# Patient Record
Sex: FEMALE | Race: BLACK OR AFRICAN AMERICAN | NOT HISPANIC OR LATINO | Employment: OTHER | URBAN - METROPOLITAN AREA
[De-identification: names, ages, dates, MRNs, and addresses within clinical notes are randomized per-mention and may not be internally consistent; named-entity substitution may affect disease eponyms.]

---

## 2019-04-27 ENCOUNTER — HOSPITAL ENCOUNTER (EMERGENCY)
Facility: HOSPITAL | Age: 84
Discharge: HOME | End: 2019-04-27
Attending: EMERGENCY MEDICINE
Payer: MEDICARE

## 2019-04-27 VITALS
HEIGHT: 67 IN | OXYGEN SATURATION: 98 % | DIASTOLIC BLOOD PRESSURE: 86 MMHG | RESPIRATION RATE: 20 BRPM | SYSTOLIC BLOOD PRESSURE: 172 MMHG | BODY MASS INDEX: 29.03 KG/M2 | HEART RATE: 95 BPM | WEIGHT: 185 LBS | TEMPERATURE: 99 F

## 2019-04-27 DIAGNOSIS — M72.2 PLANTAR FASCIITIS OF LEFT FOOT: Primary | ICD-10-CM

## 2019-04-27 DIAGNOSIS — M79.672 LEFT FOOT PAIN: ICD-10-CM

## 2019-04-27 PROCEDURE — 99283 EMERGENCY DEPT VISIT LOW MDM: CPT | Mod: 25

## 2019-04-27 NOTE — ED PROVIDER NOTES
Encounter Date: 4/27/2019    SCRIBE #1 NOTE: I, Snow Gipson, am scribing for, and in the presence of, Gino Betancourt MD.       History     Chief Complaint   Patient presents with    Foot Pain     left foot / denies injury        Time seen by provider: 8:01 AM on 04/27/2019    Soni Harris is a 85 y.o. female with HTN and osteoporosis who presents to the ED with an onset of worsening left foot pain over the past week. She states her pain is located along the arch along the bottom of the foot. The patient reports no injuries or trauma but did recently did get new shoes. She denies prior similar symptoms, fever, or any other symptoms at this time. No foot PSHx noted. No known drug allergies noted.    The history is provided by the patient.     Review of patient's allergies indicates:  No Known Allergies  Past Medical History:   Diagnosis Date    Arthritis     GERD (gastroesophageal reflux disease)     Glaucoma (increased eye pressure)     Hypertension      Past Surgical History:   Procedure Laterality Date    HYSTERECTOMY       History reviewed. No pertinent family history.  Social History     Tobacco Use    Smoking status: Former Smoker     Last attempt to quit: 9/18/2003     Years since quitting: 15.6   Substance Use Topics    Alcohol use: No    Drug use: No     Review of Systems   Constitutional: Negative for chills and fever.   HENT: Negative for nosebleeds.    Eyes: Negative for visual disturbance.   Respiratory: Negative for cough and shortness of breath.    Cardiovascular: Negative for chest pain and palpitations.   Gastrointestinal: Negative for abdominal pain, diarrhea, nausea and vomiting.   Genitourinary: Negative for dysuria and hematuria.   Musculoskeletal: Positive for myalgias (left foot). Negative for back pain and neck pain.   Skin: Negative for rash.   Neurological: Negative for seizures, syncope and headaches.     Physical Exam     Initial Vitals [04/27/19 0754]   BP Pulse Resp  Temp SpO2   (!) 172/86 95 20 98.7 °F (37.1 °C) 98 %      MAP       --         Physical Exam    Nursing note and vitals reviewed.  Constitutional: She appears well-developed and well-nourished. She is not diaphoretic. No distress.   HENT:   Head: Normocephalic and atraumatic.   Mouth/Throat: Oropharynx is clear and moist.   Eyes: Conjunctivae are normal.   Neck: Neck supple.   Cardiovascular: Normal rate, regular rhythm, normal heart sounds and intact distal pulses. Exam reveals no gallop and no friction rub.    No murmur heard.  Pulses:       Dorsalis pedis pulses are 1+ on the right side, and 1+ on the left side.   Unable to palpate the DP pulses bilaterally.    Pulmonary/Chest: Breath sounds normal. She has no wheezes. She has no rhonchi. She has no rales.   Musculoskeletal: Normal range of motion.        Left foot: There is no tenderness and no deformity.   No tenderness, edema, or deformity of the left foot.    Neurological: She is alert and oriented to person, place, and time.   5/5 strength. Symmetric, intact sensation.    Skin: No rash noted. No erythema.   Brisk capillary refill. Warm and well-perfused extremities.    Psychiatric: Her speech is normal.       ED Course   Procedures  Labs Reviewed - No data to display     Imaging Results          X-Ray Foot Complete Left (In process)                  Medical Decision Making:   History:   Old Medical Records: I decided to obtain old medical records.  Clinical Tests:   Radiological Study: Ordered and Reviewed            Scribe Attestation:   Scribe #1: I performed the above scribed service and the documentation accurately describes the services I performed. I attest to the accuracy of the note.    I, Dr. Gino Betancourt, personally performed the services described in this documentation. All medical record entries made by the scribe were at my direction and in my presence.  I have reviewed the chart and agree that the record reflects my personal performance  and is accurate and complete. Gino Betancourt MD.  8:48 AM 04/27/2019    Soni Harris is a 85 y.o. female presenting with pain along the arch of the left foot nontender to palpation consistent with plantar fasciitis.  No history of trauma with x-ray showing no fracture, dislocation, or other acute process.  Very low suspicion for other emergent process such as arterial occlusion or DVT.  Improved arch support with stretching exercises and weight-bearing as tolerated reviewed pending podiatry follow-up.  Acetaminophen as necessary for pain.  Return precautions reviewed.          ED Course as of Apr 27 0839   Sat Apr 27, 2019   0838 XR L Foot:  No fx, dislocation, FB. (my read)    [MR]      ED Course User Index  [MR] Gino Betancourt MD     Clinical Impression:       ICD-10-CM ICD-9-CM   1. Plantar fasciitis of left foot M72.2 728.71   2. Left foot pain M79.672 729.5         Disposition:   Disposition: Discharged  Condition: Stable                        Gino Betancourt MD  04/27/19 0849

## 2019-04-27 NOTE — ED NOTES
"Pt states pain to the arch of the left foot only when she puts pressure on it.  Plantar and dorsi flexion performed with no pain reported.  She states, "I feel like my arch dropped."  "

## 2019-05-07 ENCOUNTER — HOSPITAL ENCOUNTER (OUTPATIENT)
Dept: RADIOLOGY | Facility: CLINIC | Age: 84
Discharge: HOME | End: 2019-05-07
Attending: PODIATRIST
Payer: MEDICARE

## 2019-05-07 ENCOUNTER — OFFICE VISIT (OUTPATIENT)
Dept: PODIATRY | Facility: CLINIC | Age: 84
End: 2019-05-07
Payer: MEDICARE

## 2019-05-07 VITALS
WEIGHT: 184.94 LBS | DIASTOLIC BLOOD PRESSURE: 76 MMHG | BODY MASS INDEX: 29.03 KG/M2 | HEART RATE: 78 BPM | HEIGHT: 67 IN | SYSTOLIC BLOOD PRESSURE: 137 MMHG

## 2019-05-07 DIAGNOSIS — M79.672 FOOT PAIN, BILATERAL: ICD-10-CM

## 2019-05-07 DIAGNOSIS — M72.2 PLANTAR FASCIITIS: Primary | ICD-10-CM

## 2019-05-07 DIAGNOSIS — M79.671 FOOT PAIN, BILATERAL: ICD-10-CM

## 2019-05-07 DIAGNOSIS — M24.573 EQUINUS CONTRACTURE OF ANKLE: ICD-10-CM

## 2019-05-07 DIAGNOSIS — M72.2 PLANTAR FASCIITIS: ICD-10-CM

## 2019-05-07 PROCEDURE — 29540 PR STRAPPING; ANKLE &/OR FOOT: ICD-10-PCS | Mod: 50,S$GLB,, | Performed by: PODIATRIST

## 2019-05-07 PROCEDURE — 29540 STRAPPING ANKLE &/FOOT: CPT | Mod: 50,S$GLB,, | Performed by: PODIATRIST

## 2019-05-07 PROCEDURE — 73630 XR FOOT COMPLETE 3 VIEW BILATERAL: ICD-10-PCS | Mod: 26,50,S$GLB, | Performed by: RADIOLOGY

## 2019-05-07 PROCEDURE — 3075F SYST BP GE 130 - 139MM HG: CPT | Mod: CPTII,S$GLB,, | Performed by: PODIATRIST

## 2019-05-07 PROCEDURE — 99203 OFFICE O/P NEW LOW 30 MIN: CPT | Mod: 25,S$GLB,, | Performed by: PODIATRIST

## 2019-05-07 PROCEDURE — 3078F DIAST BP <80 MM HG: CPT | Mod: CPTII,S$GLB,, | Performed by: PODIATRIST

## 2019-05-07 PROCEDURE — 3075F PR MOST RECENT SYSTOLIC BLOOD PRESS GE 130-139MM HG: ICD-10-PCS | Mod: CPTII,S$GLB,, | Performed by: PODIATRIST

## 2019-05-07 PROCEDURE — 3078F PR MOST RECENT DIASTOLIC BLOOD PRESSURE < 80 MM HG: ICD-10-PCS | Mod: CPTII,S$GLB,, | Performed by: PODIATRIST

## 2019-05-07 PROCEDURE — 99999 PR PBB SHADOW E&M-EST. PATIENT-LVL III: ICD-10-PCS | Mod: PBBFAC,,, | Performed by: PODIATRIST

## 2019-05-07 PROCEDURE — 99203 PR OFFICE/OUTPT VISIT, NEW, LEVL III, 30-44 MIN: ICD-10-PCS | Mod: 25,S$GLB,, | Performed by: PODIATRIST

## 2019-05-07 PROCEDURE — 99999 PR PBB SHADOW E&M-EST. PATIENT-LVL III: CPT | Mod: PBBFAC,,, | Performed by: PODIATRIST

## 2019-05-07 PROCEDURE — 73630 X-RAY EXAM OF FOOT: CPT | Mod: 50,TC,FY,PO

## 2019-05-07 PROCEDURE — 73630 X-RAY EXAM OF FOOT: CPT | Mod: 26,50,S$GLB, | Performed by: RADIOLOGY

## 2019-05-07 RX ORDER — DICLOFENAC SODIUM 10 MG/G
2 GEL TOPICAL 4 TIMES DAILY
Qty: 100 G | Refills: 2 | Status: SHIPPED | OUTPATIENT
Start: 2019-05-07

## 2019-05-07 NOTE — PROGRESS NOTES
Subjective:      Patient ID: Soni Harris is a 85 y.o. female.    Chief Complaint: Foot Pain    Throbbing pain bottom both arches with sometimes sharp flares.  Gradual onset, worsening over past several weeks, aggravated by increased weight bearing, shoe gear, pressure.  No previous medical treatment.  OTC pain med not helping. Denies trauma, surgery.    Review of Systems   Constitution: Negative for chills, diaphoresis, fever, malaise/fatigue and night sweats.   Cardiovascular: Negative for claudication, cyanosis, leg swelling and syncope.   Skin: Negative for color change, dry skin, nail changes, rash, suspicious lesions and unusual hair distribution.   Musculoskeletal: Negative for falls, joint pain, joint swelling, muscle cramps, muscle weakness and stiffness.   Gastrointestinal: Negative for constipation, diarrhea, nausea and vomiting.   Neurological: Negative for brief paralysis, disturbances in coordination, focal weakness, numbness, paresthesias, sensory change and tremors.           Objective:      Physical Exam   Constitutional: She is oriented to person, place, and time. She appears well-developed and well-nourished. She is cooperative. No distress.   Cardiovascular:   Pulses:       Popliteal pulses are 2+ on the right side, and 2+ on the left side.        Dorsalis pedis pulses are 2+ on the right side, and 2+ on the left side.        Posterior tibial pulses are 2+ on the right side, and 2+ on the left side.   Capillary refill 3 seconds all toes/distal feet, all toes/both feet warm to touch.      Negative lymphadenopathy bilateral popliteal fossa and tarsal tunnel.      Negavie lower extremity edema bilateral.     Musculoskeletal:        Right ankle: She exhibits normal range of motion, no swelling, no ecchymosis, no deformity, no laceration and normal pulse. Achilles tendon normal. Achilles tendon exhibits no pain, no defect and normal Whalen's test results.   Sharp deep pain to palpation inferior  arch right and left at medial plantar fascia and intrinsic muscluature without ecchymosis, erythema, edema, or cardinal signs infection, and no signs of trauma.    Ankle dorsiflexion decreased at <10 degrees bilateral with moderate increase with knee flexion bilateral.    Otherwise, Normal angle, base, station of gait. All ten toes without clubbing, cyanosis, or signs of ischemia.  No pain to palpation bilateral lower extremities.  Range of motion, stability, muscle strength, and muscle tone normal bilateral feet and legs.    Lymphadenopathy: No inguinal adenopathy noted on the right or left side.   Negative lymphadenopathy bilateral popliteal fossa and tarsal tunnel.    Negative lymphangitic streaking bilateral feet/ankles/legs.   Neurological: She is alert and oriented to person, place, and time. She has normal strength. She displays no atrophy and no tremor. No sensory deficit. She exhibits normal muscle tone. Gait normal.   Reflex Scores:       Patellar reflexes are 2+ on the right side and 2+ on the left side.       Achilles reflexes are 2+ on the right side and 2+ on the left side.  Negative tinel sign to percussion sural, superficial peroneal, deep peroneal, saphenous, and posterior tibial nerves right and left ankles and feet.     Skin: Skin is warm, dry and intact. Capillary refill takes 2 to 3 seconds. No abrasion, no bruising, no burn, no ecchymosis, no laceration, no lesion and no rash noted. She is not diaphoretic. No cyanosis or erythema. No pallor. Nails show no clubbing.     Skin is normal age and health appropriate color, turgor, texture, and temperature bilateral lower extremities without ulceration, hyperpigmentation, discoloration, masses nodules or cords palpated.  No ecchymosis, erythema, edema, or cardinal signs of infection bilateral lower extremities.     Psychiatric: She has a normal mood and affect.             Assessment:       Encounter Diagnoses   Name Primary?    Plantar fasciitis Yes     Foot pain, bilateral     Equinus contracture of ankle          Plan:       Soni was seen today for foot pain.    Diagnoses and all orders for this visit:    Plantar fasciitis  -     X-Ray Foot Complete Bilateral; Future    Foot pain, bilateral  -     X-Ray Foot Complete Bilateral; Future    Equinus contracture of ankle  -     X-Ray Foot Complete Bilateral; Future    Other orders  -     diclofenac sodium (VOLTAREN) 1 % Gel; Apply 2 g topically 4 (four) times daily.      I counseled the patient on her conditions, their implications and medical management.    Patient will stretch the tendo achilles complex three times daily as demonstrated in the office.  Literature was dispensed illustrating proper stretching technique.    I applied a plantar rest strapping to the patient's right and left foot to offload symptomatic area, support the arch, and relieve pain.    Patient will obtain over the counter arch supports and wear them in shoes whenever possible.  Athletic shoes intended for walking or running are usually best.    The patient was advised that NSAID-type medications have two very important potential side effects: gastrointestinal irritation including hemorrhage and renal injuries. She was asked to take the medication with food and to stop if she experiences any GI upset. I asked her to call for vomiting, abdominal pain or black/bloody stools. The patient expresses understanding of these issues and questions were answered.    Discussed conservative treatment with shoes of adequate dimensions, material, and style to alleviate symptoms and delay or prevent surgical intervention.    Rx xrays, voltaren gel.          No follow-ups on file.

## 2019-08-28 DIAGNOSIS — M77.8 TENDINITIS OF LEFT SHOULDER: Primary | ICD-10-CM

## 2019-08-28 DIAGNOSIS — M77.8 TENDINITIS OF RIGHT SHOULDER: ICD-10-CM

## 2020-09-03 ENCOUNTER — HOSPITAL ENCOUNTER (EMERGENCY)
Facility: HOSPITAL | Age: 85
Discharge: HOME | End: 2020-09-03
Attending: EMERGENCY MEDICINE
Payer: MEDICARE

## 2020-09-03 VITALS
HEART RATE: 78 BPM | BODY MASS INDEX: 26.63 KG/M2 | OXYGEN SATURATION: 94 % | TEMPERATURE: 99 F | SYSTOLIC BLOOD PRESSURE: 124 MMHG | DIASTOLIC BLOOD PRESSURE: 57 MMHG | RESPIRATION RATE: 16 BRPM | WEIGHT: 170 LBS

## 2020-09-03 DIAGNOSIS — M25.559 HIP PAIN: ICD-10-CM

## 2020-09-03 DIAGNOSIS — M25.551 RIGHT HIP PAIN: Primary | ICD-10-CM

## 2020-09-03 PROCEDURE — 99283 EMERGENCY DEPT VISIT LOW MDM: CPT | Mod: 25

## 2020-09-03 RX ORDER — MELOXICAM 7.5 MG/1
7.5 TABLET ORAL DAILY PRN
Qty: 10 TABLET | Refills: 0 | Status: SHIPPED | OUTPATIENT
Start: 2020-09-03 | End: 2022-01-18

## 2020-09-03 NOTE — ED PROVIDER NOTES
Encounter Date: 9/3/2020    SCRIBE #1 NOTE: IRoxann, am scribing for, and in the presence of, Rodolfo Jimenez MD.       History     Chief Complaint   Patient presents with    Leg Pain     intermiitent sharp upper right leg pain radiating up into back       Time seen by provider: 4:14 PM on 2020    Soni Harris is a 86 y.o. female with PMHx of HTN and arthritis who presents to the ED with complaints of right hip pain radiating to the thigh and back for 2 days. Pain is described as intermittent and stabbing in nature. Episodes of pain lasts for approximately 5 minutes. Per daughter, patient fell today in the restroom secondary to pain. Patient denies numbness, weakness, leg swelling, chest pain, SOB, fever, abdominal pain, N/V/D, rashes, or other new symptoms. The patient has no other medical concerns or complaints at this moment. PSHx includes hysterectomy.     The history is provided by the patient and a relative.     Review of patient's allergies indicates:  No Known Allergies  Past Medical History:   Diagnosis Date    Arthritis     GERD (gastroesophageal reflux disease)     Glaucoma (increased eye pressure)     Hypertension      Past Surgical History:   Procedure Laterality Date    HYSTERECTOMY       History reviewed. No pertinent family history.  Social History     Tobacco Use    Smoking status: Former Smoker     Quit date: 2003     Years since quittin.9   Substance Use Topics    Alcohol use: No    Drug use: No     Review of Systems   Constitutional: Negative for chills and fever.   Respiratory: Negative for shortness of breath.    Cardiovascular: Negative for chest pain.   Gastrointestinal: Negative for abdominal pain, diarrhea, nausea and vomiting.   Genitourinary: Negative for difficulty urinating.   Musculoskeletal: Positive for arthralgias. Negative for joint swelling.   Skin: Negative for pallor and rash.   Neurological: Negative for weakness and numbness.    Hematological: Negative for adenopathy.   Psychiatric/Behavioral: The patient is not nervous/anxious.        Physical Exam     Initial Vitals [09/03/20 1423]   BP Pulse Resp Temp SpO2   (!) 120/59 92 16 98.8 °F (37.1 °C) 96 %      MAP       --         Physical Exam    Nursing note and vitals reviewed.  Constitutional: She appears well-developed and well-nourished. She is not diaphoretic. No distress.   HENT:   Head: Normocephalic and atraumatic.   Eyes: EOM are normal. Pupils are equal, round, and reactive to light.   Neck: Normal range of motion. Neck supple.   Cardiovascular: Normal rate, regular rhythm, normal heart sounds and intact distal pulses. Exam reveals no gallop and no friction rub.    No murmur heard.  Pulmonary/Chest: Breath sounds normal. No respiratory distress. She has no wheezes. She has no rhonchi. She has no rales.   Abdominal: Soft. There is no abdominal tenderness. There is no rebound and no guarding.   Musculoskeletal: Normal range of motion.      Right hip: She exhibits tenderness. She exhibits normal range of motion.      Comments: Tenderness to posterior right hip. Negative SLR bilaterally.    Neurological: She is alert and oriented to person, place, and time.   Skin: Skin is warm and dry.   Psychiatric: She has a normal mood and affect. Her behavior is normal. Judgment and thought content normal.         ED Course   Procedures  Labs Reviewed - No data to display       Imaging Results          X-Ray Hip 2 View Right (Final result)  Result time 09/03/20 17:09:10    Final result by Kristen Villela MD (09/03/20 17:09:10)                 Impression:      Degenerative changes at the hips.  No acute fracture or dislocation.      Electronically signed by: Kristen Villela MD  Date:    09/03/2020  Time:    17:09             Narrative:    EXAMINATION:  XR HIP 2 VIEW RIGHT    CLINICAL HISTORY:  Pain in unspecified hip    TECHNIQUE:  AP view of the pelvis and frog leg lateral view of the right hip  were performed.    COMPARISON:  None    FINDINGS:  There are mild degenerative changes at the hips bilaterally and medial aspects of the hip joints appear mildly narrowed bilaterally.  Small calcification adjacent to the right superior acetabular rim suggesting os acetabular other could be related to labral pathology.  No acute fracture or dislocation.  There is sclerosis of the body of the symphysis pubis.                                 Medical Decision Making:   History:   Old Medical Records: I decided to obtain old medical records.  Initial Assessment:   86-year-old female presented with hip pain.  Differential Diagnosis:   Initial differential diagnosis included but not limited to osteoarthritis, strain, and sprain.  Clinical Tests:   Radiological Study: Reviewed and Ordered  ED Management:  The patient was urgently evaluated in the emergency department, her evaluation was significant for an elderly female with tenderness noted to the right hip.  The patient's x-ray was significant for degenerative changes only per Radiology.  The patient likely has osteoarthritis causing her symptoms.  The patient is stable for discharge to home.  She will be discharged home with p.o. Mobic and she is to otherwise follow up with her PCP for further care.            Scribe Attestation:   Scribe #1: I performed the above scribed service and the documentation accurately describes the services I performed. I attest to the accuracy of the note.        I, Dr. Rodolfo Jimenez, personally performed the services described in this documentation. All medical record entries made by the scribe were at my direction and in my presence.  I have reviewed the chart and agree that the record reflects my personal performance and is accurate and complete. Rodolfo Jimenez MD.  7:26 PM 09/03/2020          Clinical Impression:       ICD-10-CM ICD-9-CM   1. Right hip pain  M25.551 719.45   2. Hip pain  M25.559 719.45             ED Disposition  Condition    Discharge Stable        ED Prescriptions     Medication Sig Dispense Start Date End Date Auth. Provider    meloxicam (MOBIC) 7.5 MG tablet Take 1 tablet (7.5 mg total) by mouth daily as needed for Pain. 10 tablet 9/3/2020  Rodolfo Jimenez MD        Follow-up Information     Follow up With Specialties Details Why Contact Info    Fran Gilbert MD Family Medicine Schedule an appointment as soon as possible for a visit   [address] [facility] [place] [address]  [phone]                                       Rodolfo Jimenze MD  09/03/20 1928

## 2020-09-03 NOTE — ED NOTES
Assumed care:  Soni Harris is awake, alert and oriented x 3, skin warm and dry, in NAD with family at bedside.  Patient CO right leg pain.  States that pain started 2 days ago.  States that pain was on the top of her leg now radiating to right hip and down back side of right leg.  States that pain comes in spasms lasting about 5 minutes.  States that today, pain started while she was in the bathroom and she fell.  Denies any injuries from that.      Patient identifiers for Soni Harris checked and correct.  LOC:  Soni Harris is awake, alert, and aware of environment with an appropriate affect. She is oriented x 3 and speaking appropriately.  APPEARANCE:  She is resting comfortably and in no acute distress. She is clean and well groomed, patient's clothing is properly fastened.  SKIN:  The skin is warm and dry. She has normal skin turgor and moist mucus membranes. Skin is intact; no bruising or breakdown noted.  MUSCULOSKELETAL:  She is moving all extremities well, no obvious deformities noted. Pulses intact.  Right leg pain  RESPIRATORY:  Airway is open and patent. Respirations are spontaneous and non-labored with normal effort and rate.  CARDIAC:  She has a normal rate and rhythm. No peripheral edema noted. Capillary refill < 3 seconds.  ABDOMEN:  No distention noted.  Soft and non-tender upon palpation.  NEUROLOGICAL:  PERRL. Facial expression is symmetrical. Hand grasps are equal bilaterally. Normal sensation in all extremities when touched with finger.  Allergies reported:  Review of patient's allergies indicates:  No Known Allergies  OTHER NOTES:

## 2021-11-22 ENCOUNTER — TELEPHONE (OUTPATIENT)
Dept: NEUROLOGY | Facility: CLINIC | Age: 86
End: 2021-11-22
Payer: MEDICARE

## 2021-11-24 ENCOUNTER — TELEPHONE (OUTPATIENT)
Dept: NEUROLOGY | Facility: CLINIC | Age: 86
End: 2021-11-24
Payer: MEDICARE

## 2021-11-29 ENCOUNTER — TELEPHONE (OUTPATIENT)
Dept: NEUROLOGY | Facility: CLINIC | Age: 86
End: 2021-11-29
Payer: MEDICARE

## 2022-01-18 ENCOUNTER — OFFICE VISIT (OUTPATIENT)
Dept: NEUROLOGY | Facility: CLINIC | Age: 87
End: 2022-01-18
Payer: MEDICARE

## 2022-01-18 ENCOUNTER — LAB VISIT (OUTPATIENT)
Dept: LAB | Facility: HOSPITAL | Age: 87
End: 2022-01-18
Payer: MEDICARE

## 2022-01-18 VITALS
RESPIRATION RATE: 17 BRPM | SYSTOLIC BLOOD PRESSURE: 131 MMHG | DIASTOLIC BLOOD PRESSURE: 62 MMHG | HEIGHT: 67 IN | HEART RATE: 67 BPM | BODY MASS INDEX: 22.02 KG/M2 | WEIGHT: 140.31 LBS

## 2022-01-18 DIAGNOSIS — F02.818 LATE ONSET ALZHEIMER'S DEMENTIA WITH BEHAVIORAL DISTURBANCE: Primary | ICD-10-CM

## 2022-01-18 DIAGNOSIS — F02.818 LATE ONSET ALZHEIMER'S DEMENTIA WITH BEHAVIORAL DISTURBANCE: ICD-10-CM

## 2022-01-18 DIAGNOSIS — G30.1 LATE ONSET ALZHEIMER'S DEMENTIA WITH BEHAVIORAL DISTURBANCE: Primary | ICD-10-CM

## 2022-01-18 DIAGNOSIS — G30.1 LATE ONSET ALZHEIMER'S DEMENTIA WITH BEHAVIORAL DISTURBANCE: ICD-10-CM

## 2022-01-18 LAB
FOLATE SERPL-MCNC: 17.1 NG/ML (ref 4–24)
TSH SERPL DL<=0.005 MIU/L-ACNC: 1.66 UIU/ML (ref 0.4–4)
VIT B12 SERPL-MCNC: 1152 PG/ML (ref 210–950)

## 2022-01-18 PROCEDURE — 86592 SYPHILIS TEST NON-TREP QUAL: CPT | Performed by: NURSE PRACTITIONER

## 2022-01-18 PROCEDURE — 82607 VITAMIN B-12: CPT | Performed by: NURSE PRACTITIONER

## 2022-01-18 PROCEDURE — 1126F PR PAIN SEVERITY QUANTIFIED, NO PAIN PRESENT: ICD-10-PCS | Mod: CPTII,S$GLB,, | Performed by: NURSE PRACTITIONER

## 2022-01-18 PROCEDURE — 1101F PR PT FALLS ASSESS DOC 0-1 FALLS W/OUT INJ PAST YR: ICD-10-PCS | Mod: CPTII,S$GLB,, | Performed by: NURSE PRACTITIONER

## 2022-01-18 PROCEDURE — 36415 COLL VENOUS BLD VENIPUNCTURE: CPT | Mod: PO | Performed by: NURSE PRACTITIONER

## 2022-01-18 PROCEDURE — 3288F PR FALLS RISK ASSESSMENT DOCUMENTED: ICD-10-PCS | Mod: CPTII,S$GLB,, | Performed by: NURSE PRACTITIONER

## 2022-01-18 PROCEDURE — 99205 OFFICE O/P NEW HI 60 MIN: CPT | Mod: S$GLB,,, | Performed by: NURSE PRACTITIONER

## 2022-01-18 PROCEDURE — 1159F PR MEDICATION LIST DOCUMENTED IN MEDICAL RECORD: ICD-10-PCS | Mod: CPTII,S$GLB,, | Performed by: NURSE PRACTITIONER

## 2022-01-18 PROCEDURE — 82746 ASSAY OF FOLIC ACID SERUM: CPT | Performed by: NURSE PRACTITIONER

## 2022-01-18 PROCEDURE — 1160F PR REVIEW ALL MEDS BY PRESCRIBER/CLIN PHARMACIST DOCUMENTED: ICD-10-PCS | Mod: CPTII,S$GLB,, | Performed by: NURSE PRACTITIONER

## 2022-01-18 PROCEDURE — 1126F AMNT PAIN NOTED NONE PRSNT: CPT | Mod: CPTII,S$GLB,, | Performed by: NURSE PRACTITIONER

## 2022-01-18 PROCEDURE — 1159F MED LIST DOCD IN RCRD: CPT | Mod: CPTII,S$GLB,, | Performed by: NURSE PRACTITIONER

## 2022-01-18 PROCEDURE — 1101F PT FALLS ASSESS-DOCD LE1/YR: CPT | Mod: CPTII,S$GLB,, | Performed by: NURSE PRACTITIONER

## 2022-01-18 PROCEDURE — 1160F RVW MEDS BY RX/DR IN RCRD: CPT | Mod: CPTII,S$GLB,, | Performed by: NURSE PRACTITIONER

## 2022-01-18 PROCEDURE — 99205 PR OFFICE/OUTPT VISIT, NEW, LEVL V, 60-74 MIN: ICD-10-PCS | Mod: S$GLB,,, | Performed by: NURSE PRACTITIONER

## 2022-01-18 PROCEDURE — 84443 ASSAY THYROID STIM HORMONE: CPT | Performed by: NURSE PRACTITIONER

## 2022-01-18 PROCEDURE — 99999 PR PBB SHADOW E&M-EST. PATIENT-LVL V: ICD-10-PCS | Mod: PBBFAC,,, | Performed by: NURSE PRACTITIONER

## 2022-01-18 PROCEDURE — 3288F FALL RISK ASSESSMENT DOCD: CPT | Mod: CPTII,S$GLB,, | Performed by: NURSE PRACTITIONER

## 2022-01-18 PROCEDURE — 99999 PR PBB SHADOW E&M-EST. PATIENT-LVL V: CPT | Mod: PBBFAC,,, | Performed by: NURSE PRACTITIONER

## 2022-01-18 RX ORDER — GLUCOSAM/CHONDRO/HERB 149/HYAL 750-100 MG
TABLET ORAL
COMMUNITY

## 2022-01-18 RX ORDER — ALENDRONATE SODIUM 5 MG/1
5 TABLET ORAL
COMMUNITY

## 2022-01-18 RX ORDER — DONEPEZIL HYDROCHLORIDE 10 MG/1
10 TABLET, FILM COATED ORAL DAILY
COMMUNITY
Start: 2021-11-08

## 2022-01-18 RX ORDER — ATORVASTATIN CALCIUM 20 MG/1
20 TABLET, FILM COATED ORAL DAILY
COMMUNITY
Start: 2021-11-08

## 2022-01-18 RX ORDER — METOPROLOL SUCCINATE 50 MG/1
50 TABLET, EXTENDED RELEASE ORAL DAILY
COMMUNITY
Start: 2021-09-27

## 2022-01-18 RX ORDER — MEMANTINE HYDROCHLORIDE 10 MG/1
10 TABLET ORAL DAILY
COMMUNITY

## 2022-01-18 RX ORDER — DOCUSATE SODIUM 100 MG/1
100 CAPSULE, LIQUID FILLED ORAL DAILY
COMMUNITY
End: 2022-02-23 | Stop reason: SDUPTHER

## 2022-01-18 NOTE — PATIENT INSTRUCTIONS
To prevent further memory loss, some of the best preventative measures are following a healthy diet, getting regular exercise, and ensuring good sleep habits.  Approximately 30 to 45 minutes of brisk physical activity (brisk walking, swimming, stationary bicycle, etc.) 5 days a week has been shown to improve function in vascular dementias, and can lower the risk of stroke and slow progression of memory loss.       To prevent further memory loss, some of the best preventative measures are following a healthy diet, getting regular exercise, and ensuring good sleep habits.  Approximately 30 to 45 minutes of brisk physical activity (brisk walking, swimming, stationary bicycle, etc.) 5 days a week has been shown to improve function in vascular dementias, and can lower the risk of stroke and slow progression of memory loss.     A Mediterranean style diet, or the DASH diet with lots of fresh fruits and vegetables, whole grains, more fish and chicken, less red meat, less dairy, less processed foods is also beneficial. For more information see:     https://www.rush.[facility]/news/diet-may-help-prevent-alzheimers      Minimize or eliminate the use of alcohol, and discuss any prescription medications you might be taking with your doctor to avoid medications which can cause sedation or worsen cognitive function.     Establish a regular, consistent sleep pattern and practice good sleep hygiene.  Avoid screen time (computer, TV, smartphones or tablets) or heavy meals for at least an hour before bedtime, and avoid caffeine or stimulants after 2 PM. Exercise earlier in the day or mornings and keep your sleeping environment comfortable.      Socializing with friends and family and staying both mentally and physically active is also very important. Continue with hobbies or activities that are engaging and practice activities that are mentally stimulating (word puzzles, Sudoku, etc) and stay active in the community.    Some  supplements which may be of potential benefit include:  - tumeric (curcumin) supplement  - omega-3 fatty acids with sufficient amounts of EPA and DHA, 1000 to 2000 mg a day   - Vitamin D3 supplement 2000 units (IU) daily   - Green tea and green tea extracts may also help (caffeine free)   - supplements containing Phosphatidylserine (PS) and phosphatidylcholine (PC)     There may be some benefit to dietary supplements, however there is no definitive evidence to support the prevention of cognitive impairment or dementia.     Please look into the following websites, to help you find resources including day programs, caregivers and caregiver support, legal questions, support groups, etc.    [facility] ([facility])  [facility] - [address]  [facility] - [address]    Group meetings facilitated by Aneudy Esparza MA, LARA [phone]    ADDRESS  Mailing Address:  [address] [place] [address] Address:  [address] [place] [address]   Web Address:  [email or web address] [email or web address]     Services offered at this location:  Chore, Congregate Meals, Home Delivered Meals, Homemaker, Information and Assistance, Legal, Material Aid, Medical Alert, Medication Management, NFCSP Information and Assistance, NFCSP In-Home Respite, NFCSP Material Aid, NGCSP Personal Care , NFCSP Public Education, NFCSP Sitter Service, NFCSP Support Groups, Nutrition Counseling, Nutrition Education, Outreach, Recreation, Transportation, Utility Assistance, Wellness, Area Agency on Aging, [place] on Aging      Website for the Alzheimers Association:  http://www.alz.org/    Excellent resources for finding community resources   Action plan navigator and online tools   Call 1658.133.9853 for 24/7 helpline    [facility] Office of Aging and Adult Services:  Http://www.ldh.[place].gov/index.cfm/subhome/12/n/7    Peace With Dementia: http://carepartnermentoring.com/    Website for  [facility] Agency on Ageing: http://ea.[place].[facility]/index.cfm?md=tanika&tmp=category&catID=38&nid=24&ssid=0&startIndex=1       PATIENT/FAMILY RESOURCES:  1. Alzheimer's Association       http://www.alz.org  2. Alzheimer's Foundation of [place]     http://www.alzfdn.org  3. The Alzheimers Disease Education and Referral Center  https://www.arnoldo.nih.gov/alzheimers  4. Lewy Body Dementia Association      http://www.lbda.org  5. National [place] of Mental Health     http://www.nimh.nih.gov  6. National [place] on Mental Illness     http://www.dayna.org  7. Mental Health [place]       http://www.mentalhealthamerica.net  8. Mental Health.gov        http://www.mentalhealth.gov  9. National [place] for Behavioral Health     http://www.thenationalcouncil.org  10. Substance Abuse and Mental Health Services Administration  http://www.samhsa.gov    11. Licensed local counselors, social workers, psychiatrists, psychologists - one starting point is the Psychology Today website, therapist finder

## 2022-01-18 NOTE — PROGRESS NOTES
NEUROLOGY  Outpatient CONSULT    [facility]  [address], [place] [address]  [phone] (office) / [phone] (fax)    Patient Name:  Soni Harris  :  1934  MR #:  [id number]  Acct #:  [id number]    Date of Neurology Consult: 2022  Name of Provider: AWILDA Allen    Other Physicians:  Fran Gilbert MD (Primary Care Physician); Lois Batista FNP-C (Referring)      Chief Complaint: Memory Loss      History of Present Illness (HPI):  Soni Harris is a left handed 87 y.o. female.   Patient is here today for memory loss. She is accompanied by her daughter who helps supply some information. Patient does not feel like she has memory issues. She currently lives at home with her daughter. Daughter states she may forget where she is and how to make her way through the house. She repeats often and has trouble operating the remote control.       Patient's highest level of education completed was highschool. 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 The onset of memory issues likely began about . Daughter states at that time she had to move closer to her to help care for her. She struggles more with short term memory. Daughter states she juliet often get frustrated when unable to recall. She denies depression. Daughter states she juliet often hear things that aren't there or think someone is knocking at the door. She wakes up frequently at night as daughter notices this. It is unsure how much she is sleeping at night. Patient has recently started on Melatonin. Her daughter is monitoring her while sleeping now. Patient may take small naps throughout the day. She struggles with executive function. Daughter is now managing her finances after the patient had issues with balancing her checkbook. Daughter also helps manage her medications. Daughter does assist with bathing her but patient is able to perform most ADLs without assistance. She has no isses  with word finding or object recognition. She reports some mild issues with urgency incontinence. She denies recent falls. She is no longer driving. During the day she watches TV. She is maintained on Aricpet and Namenda as per her PCP. She has been on these medications for about 2 years.               Past Medical, Surgical, Family & Social History:   Past Medical History:   Diagnosis Date    Arthritis     GERD (gastroesophageal reflux disease)     Glaucoma (increased eye pressure)     Hypertension      Past Surgical History:   Procedure Laterality Date    HYSTERECTOMY       History reviewed. No pertinent family history.  Alcohol use:  reports no history of alcohol use.   (Of note, 0.6 oz = 1 beer or 6 oz = 10 beers).  Tobacco use:  reports that she quit smoking about 18 years ago. Her smoking use included cigarettes. She has never used smokeless tobacco.  Street drug use:  reports no history of drug use.  Allergies: Patient has no known allergies..    Home Medications:     Current Outpatient Medications:     alendronate (FOSAMAX) 5 MG Tab, Take 5 mg by mouth before breakfast., Disp: , Rfl:     amlodipine (NORVASC) 2.5 MG tablet, Take 2.5 mg by mouth once daily., Disp: , Rfl:     aspirin 325 MG tablet, Take 1 tablet (325 mg total) by mouth once daily., Disp: 30 tablet, Rfl: 11    calcium citrate (CALCITRATE) 200 mg (950 mg) tablet, Take 1 tablet by mouth 2 (two) times daily., Disp: , Rfl:     diclofenac sodium (VOLTAREN) 1 % Gel, Apply 2 g topically 4 (four) times daily., Disp: 100 g, Rfl: 2    losartan (COZAAR) 25 MG tablet, Take 25 mg by mouth once daily., Disp: , Rfl:     metoprolol succinate (TOPROL-XL) 50 MG 24 hr tablet, Take 50 mg by mouth once daily., Disp: , Rfl:     multivit-min-FA-lycopen-lutein 0.4-300-250 mg-mcg-mcg Tab, Take by mouth., Disp: , Rfl:     omeprazole (PRILOSEC) 20 MG capsule, Take 20 mg by mouth once daily., Disp: , Rfl:     risedronate (ACTONEL) 5 MG tablet, Take 5 mg by  "mouth once a week., Disp: , Rfl:     atorvastatin (LIPITOR) 20 MG tablet, Take 20 mg by mouth once daily., Disp: , Rfl:     donepeziL (ARICEPT) 10 MG tablet, Take 10 mg by mouth once daily., Disp: , Rfl:     memantine (NAMENDA) 10 MG Tab, Take 10 mg by mouth once daily., Disp: , Rfl:     metoprolol tartrate (LOPRESSOR) 25 MG tablet, Take 50 mg by mouth once daily., Disp: , Rfl:     omega 3-dha-epa-fish oil (FISH OIL) 1,000 mg (120 mg-180 mg) Cap, 1 capsule, Disp: , Rfl:     Physical Examination:  /62 (BP Location: Left arm, Patient Position: Sitting, BP Method: Medium (Automatic))   Pulse 67   Resp 17   Ht 5' 7" (1.702 m)   Wt 63.6 kg (140 lb 5.2 oz)   BMI 21.98 kg/m²   MMSE 1/18/2022   What is the (year), (season), (date), (day), (month)? 0   Where are we (state), (country), (town or city), (hospital), (floor)? 2   Name 3 common objects (eg. "apple", "table", "harleen"). Take 1 second to say each. Then ask the patient to repeat all 3. Give 1 point for each correct answer. Then repeat them until he/she learns all 3. Count trials and record. 2   Serial 7's backwards. Stop after 5 answers. (100,93,86,79,72) or alternatively  spell "WORLD" backwards. (D..L..R..O..W). The score is the number of letters in correct order. 0   Ask for the 3 common objects named earlier in the exam. Give 1 point for each correct answer. 0   Name a "pencil" and "watch." 1   Repeat the following: "No ifs, ands, or buts." 1   Follow a 3-stage command: "Take a paper in your right hand, fold it in half, & put it on the floor." 1   Read and obey the following: (see paper exam) 0   Write a sentence. 1   Copy the following design: (see paper exam) 0   Total MMSE Score 8   Some recent data might be hidden       GENERAL:  General appearance: Well, non-toxic appearing.  No apparent distress.  Neck: supple.  .    MENTAL STATUS:  Alertness, attention span & concentration: decreased  Language: normal.  Orientation to self, place & time:  " "Unable to state current date, month, year or city she lives in  Memory, recent & remote: limited  Fund of knowledge: limited  MMSE:8/30    SPEECH:  Clear and fluent.  Follows complex commands.    CRANIAL NERVES:  Cranial Nerves II-XII were examined.  II - Visual fields: normal.  III, IV, VI: PERRL, EOMI, No ptosis, No nystagmus.  V - Facial sensation: normal.  VII - Face symmetry & mobility: not assessed d/t COVID precautions  VIII - Hearing: normal.  IX, X - Palate:  not assessed d/t COVID precautions  XI - Shoulder shrug: normal.  XII - Tongue protrusion: not assessed d/t COVID precautions    GROSS MOTOR:  Gait & station: small steps, multi step turn,  slightly stooped posture  Tone: normal.  Abnormal movements: none.  Finger-nose: normal.  Rapid alternating movements: normal.  Pronator drift: normal      MUSCLE STRENGTH:   Generalized weakness throughout. Non focal    REFLEXES:    RIGHT Reflex   LEFT   2+ Biceps 2+   2+ Brachiorad. 2+        2+ Patellar 2+     SENSORY:  Light touch: Normal throughout.              Diagnostic Data Reviewed:         MRI brain 2013:  New very small area of peripheral infarction in a single gyrus of the left parietal lobe adjacent to the falx.     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  Mild brain atrophy with deep white matter ischemic changes.              Assessment and Plan:  Soni Harris is a 87 y.o. female.      Problem List Items Addressed This Visit        Other    Late onset Alzheimer's dementia with behavioral disturbance - Primary    Current Assessment & Plan     reports of memory decline with onset ~ 2016. Pt has been living with her daughter who helps care for her.   There are reports of repetition and short term memory difficulty. She is not wandering but does get up often throughout the night to "pittle" around the house. Daughter has recently started to monitor her at night. Daughter reports some auditory hallucinations but this " decreased after lowering her Namenda dose to 10 mg QD.   MMSE today 8/30   - suspect major neurodegenerative process, likely vascular etiology. Pt had difficulty completing entire MMSE. I do not believe she would be able to endure Neuro psych testing.    Obtain updated MRI brain and serologies   - MRI brain scan from 2013 reported old infarcts  Pt currently maintained on Namenda and Aricept. There are no previous records or memory testing in order to determine trends.  Safety concerns discussed.                               Important to note, also  has a past medical history of Arthritis, GERD (gastroesophageal reflux disease), Glaucoma (increased eye pressure), and Hypertension.            The patient will return to clinic in 4-6 months with either Dr. Stewart or  in memory clinic        All questions were answered and patient is comfortable with the plan.       Thank you very much for the opportunity to assist in this patient's care.    If you have any questions or concerns, please do not hesitate to contact me at any time.    Sincerely,     AWILDA Allen  [facility] [place]         I spent a total of 65 minutes on the day of the visit.This includes face to face time and non-face to face time preparing to see the patient (eg, review of tests), Obtaining and/or reviewing separately obtained history, Documenting clinical information in the electronic or other health record, Independently interpreting resultsand communicating results to the patient/family/caregiver, or Care coordination.

## 2022-01-18 NOTE — ASSESSMENT & PLAN NOTE
"reports of memory decline with onset ~ 2016. Pt has been living with her daughter who helps care for her.   There are reports of repetition and short term memory difficulty. She is not wandering but does get up often throughout the night to "pittle" around the house. Daughter has recently started to monitor her at night. Daughter reports some auditory hallucinations but this decreased after lowering her Namenda dose to 10 mg QD.   MMSE today 8/30   - suspect major neurodegenerative process, likely vascular etiology. Pt had difficulty completing entire MMSE. I do not believe she would be able to endure Neuro psych testing.    Obtain updated MRI brain and serologies   - MRI brain scan from 2013 reported old infarcts  Pt currently maintained on Namenda and Aricept. There are no previous records or memory testing in order to determine trends.  Safety concerns discussed.    "

## 2022-01-25 ENCOUNTER — PATIENT MESSAGE (OUTPATIENT)
Dept: NEUROLOGY | Facility: CLINIC | Age: 87
End: 2022-01-25
Payer: MEDICARE

## 2022-01-25 LAB — RPR SER QL: NORMAL

## 2022-01-28 ENCOUNTER — HOSPITAL ENCOUNTER (OUTPATIENT)
Dept: RADIOLOGY | Facility: HOSPITAL | Age: 87
Discharge: HOME | End: 2022-01-28
Attending: NURSE PRACTITIONER
Payer: MEDICARE

## 2022-01-28 DIAGNOSIS — F02.818 LATE ONSET ALZHEIMER'S DEMENTIA WITH BEHAVIORAL DISTURBANCE: ICD-10-CM

## 2022-01-28 DIAGNOSIS — G30.1 LATE ONSET ALZHEIMER'S DEMENTIA WITH BEHAVIORAL DISTURBANCE: ICD-10-CM

## 2022-01-28 PROCEDURE — 70551 MRI BRAIN STEM W/O DYE: CPT | Mod: TC,PO

## 2022-02-23 ENCOUNTER — HOSPITAL ENCOUNTER (EMERGENCY)
Facility: HOSPITAL | Age: 87
Discharge: HOME | End: 2022-02-23
Attending: EMERGENCY MEDICINE
Payer: MEDICARE

## 2022-02-23 VITALS
BODY MASS INDEX: 24.24 KG/M2 | HEIGHT: 64 IN | TEMPERATURE: 98 F | SYSTOLIC BLOOD PRESSURE: 192 MMHG | RESPIRATION RATE: 20 BRPM | HEART RATE: 67 BPM | OXYGEN SATURATION: 100 % | DIASTOLIC BLOOD PRESSURE: 82 MMHG | WEIGHT: 142 LBS

## 2022-02-23 DIAGNOSIS — N30.01 ACUTE CYSTITIS WITH HEMATURIA: Primary | ICD-10-CM

## 2022-02-23 DIAGNOSIS — K59.00 CONSTIPATION, UNSPECIFIED CONSTIPATION TYPE: ICD-10-CM

## 2022-02-23 LAB
ALBUMIN SERPL BCP-MCNC: 3.2 G/DL (ref 3.5–5.2)
ALP SERPL-CCNC: 108 U/L (ref 55–135)
ALT SERPL W/O P-5'-P-CCNC: 13 U/L (ref 10–44)
ANION GAP SERPL CALC-SCNC: 10 MMOL/L (ref 8–16)
AST SERPL-CCNC: 17 U/L (ref 10–40)
BACTERIA #/AREA URNS HPF: ABNORMAL /HPF
BASOPHILS # BLD AUTO: 0.07 K/UL (ref 0–0.2)
BASOPHILS NFR BLD: 0.8 % (ref 0–1.9)
BILIRUB SERPL-MCNC: 0.3 MG/DL (ref 0.1–1)
BILIRUB UR QL STRIP: NEGATIVE
BUN SERPL-MCNC: 12 MG/DL (ref 8–23)
CALCIUM SERPL-MCNC: 9.2 MG/DL (ref 8.7–10.5)
CHLORIDE SERPL-SCNC: 106 MMOL/L (ref 95–110)
CLARITY UR: ABNORMAL
CO2 SERPL-SCNC: 26 MMOL/L (ref 23–29)
COLOR UR: YELLOW
CREAT SERPL-MCNC: 0.8 MG/DL (ref 0.5–1.4)
DIFFERENTIAL METHOD: ABNORMAL
EOSINOPHIL # BLD AUTO: 0.2 K/UL (ref 0–0.5)
EOSINOPHIL NFR BLD: 2.9 % (ref 0–8)
ERYTHROCYTE [DISTWIDTH] IN BLOOD BY AUTOMATED COUNT: 13.2 % (ref 11.5–14.5)
EST. GFR  (AFRICAN AMERICAN): >60 ML/MIN/1.73 M^2
EST. GFR  (NON AFRICAN AMERICAN): >60 ML/MIN/1.73 M^2
GLUCOSE SERPL-MCNC: 96 MG/DL (ref 70–110)
GLUCOSE UR QL STRIP: NEGATIVE
HCT VFR BLD AUTO: 33.7 % (ref 37–48.5)
HGB BLD-MCNC: 10.9 G/DL (ref 12–16)
HGB UR QL STRIP: ABNORMAL
IMM GRANULOCYTES # BLD AUTO: 0.02 K/UL (ref 0–0.04)
IMM GRANULOCYTES NFR BLD AUTO: 0.2 % (ref 0–0.5)
KETONES UR QL STRIP: NEGATIVE
LEUKOCYTE ESTERASE UR QL STRIP: ABNORMAL
LIPASE SERPL-CCNC: 19 U/L (ref 4–60)
LYMPHOCYTES # BLD AUTO: 3.2 K/UL (ref 1–4.8)
LYMPHOCYTES NFR BLD: 38.6 % (ref 18–48)
MCH RBC QN AUTO: 30.6 PG (ref 27–31)
MCHC RBC AUTO-ENTMCNC: 32.3 G/DL (ref 32–36)
MCV RBC AUTO: 95 FL (ref 82–98)
MICROSCOPIC COMMENT: ABNORMAL
MONOCYTES # BLD AUTO: 0.9 K/UL (ref 0.3–1)
MONOCYTES NFR BLD: 10.8 % (ref 4–15)
NEUTROPHILS # BLD AUTO: 3.8 K/UL (ref 1.8–7.7)
NEUTROPHILS NFR BLD: 46.7 % (ref 38–73)
NITRITE UR QL STRIP: POSITIVE
NRBC BLD-RTO: 0 /100 WBC
PH UR STRIP: 7 [PH] (ref 5–8)
PLATELET # BLD AUTO: 238 K/UL (ref 150–450)
PMV BLD AUTO: 11 FL (ref 9.2–12.9)
POTASSIUM SERPL-SCNC: 4.6 MMOL/L (ref 3.5–5.1)
PROT SERPL-MCNC: 6.7 G/DL (ref 6–8.4)
PROT UR QL STRIP: NEGATIVE
RBC # BLD AUTO: 3.56 M/UL (ref 4–5.4)
RBC #/AREA URNS HPF: 9 /HPF (ref 0–4)
SODIUM SERPL-SCNC: 142 MMOL/L (ref 136–145)
SP GR UR STRIP: 1.01 (ref 1–1.03)
SQUAMOUS #/AREA URNS HPF: 23 /HPF
URN SPEC COLLECT METH UR: ABNORMAL
UROBILINOGEN UR STRIP-ACNC: NEGATIVE EU/DL
WBC # BLD AUTO: 8.24 K/UL (ref 3.9–12.7)
WBC #/AREA URNS HPF: 57 /HPF (ref 0–5)

## 2022-02-23 PROCEDURE — 81000 URINALYSIS NONAUTO W/SCOPE: CPT | Performed by: NURSE PRACTITIONER

## 2022-02-23 PROCEDURE — 36415 COLL VENOUS BLD VENIPUNCTURE: CPT | Performed by: NURSE PRACTITIONER

## 2022-02-23 PROCEDURE — 80053 COMPREHEN METABOLIC PANEL: CPT | Performed by: NURSE PRACTITIONER

## 2022-02-23 PROCEDURE — 83690 ASSAY OF LIPASE: CPT | Performed by: NURSE PRACTITIONER

## 2022-02-23 PROCEDURE — 85025 COMPLETE CBC W/AUTO DIFF WBC: CPT | Performed by: NURSE PRACTITIONER

## 2022-02-23 PROCEDURE — 63600175 PHARM REV CODE 636 W HCPCS: Performed by: NURSE PRACTITIONER

## 2022-02-23 PROCEDURE — 87088 URINE BACTERIA CULTURE: CPT | Performed by: NURSE PRACTITIONER

## 2022-02-23 PROCEDURE — 99285 EMERGENCY DEPT VISIT HI MDM: CPT | Mod: 25

## 2022-02-23 PROCEDURE — 96365 THER/PROPH/DIAG IV INF INIT: CPT

## 2022-02-23 PROCEDURE — 87086 URINE CULTURE/COLONY COUNT: CPT | Performed by: NURSE PRACTITIONER

## 2022-02-23 PROCEDURE — 25500020 PHARM REV CODE 255: Performed by: EMERGENCY MEDICINE

## 2022-02-23 PROCEDURE — 87077 CULTURE AEROBIC IDENTIFY: CPT | Performed by: NURSE PRACTITIONER

## 2022-02-23 PROCEDURE — 87186 SC STD MICRODIL/AGAR DIL: CPT | Performed by: NURSE PRACTITIONER

## 2022-02-23 RX ORDER — MSM/ALOE VERA/EMU OIL/HERBAL66
GEL (GRAM) TOPICAL
COMMUNITY

## 2022-02-23 RX ORDER — LOSARTAN POTASSIUM 50 MG/1
50 TABLET ORAL DAILY
COMMUNITY
Start: 2022-02-11

## 2022-02-23 RX ORDER — POLYETHYLENE GLYCOL 3350 17 G/17G
17 POWDER, FOR SOLUTION ORAL 2 TIMES DAILY
Qty: 10 EACH | Refills: 0 | Status: SHIPPED | OUTPATIENT
Start: 2022-02-23 | End: 2022-02-28

## 2022-02-23 RX ORDER — AMLODIPINE BESYLATE 5 MG/1
5 TABLET ORAL DAILY
COMMUNITY

## 2022-02-23 RX ORDER — DOCUSATE SODIUM 100 MG/1
100 CAPSULE, LIQUID FILLED ORAL 2 TIMES DAILY PRN
Qty: 30 CAPSULE | Refills: 0 | Status: SHIPPED | OUTPATIENT
Start: 2022-02-23

## 2022-02-23 RX ORDER — DOCUSATE SODIUM 100 MG/1
100 CAPSULE, LIQUID FILLED ORAL DAILY
Qty: 30 CAPSULE | Refills: 0 | Status: SHIPPED | OUTPATIENT
Start: 2022-02-23

## 2022-02-23 RX ORDER — AMOXICILLIN AND CLAVULANATE POTASSIUM 875; 125 MG/1; MG/1
1 TABLET, FILM COATED ORAL 2 TIMES DAILY
Qty: 14 TABLET | Refills: 0 | Status: SHIPPED | OUTPATIENT
Start: 2022-02-23

## 2022-02-23 RX ADMIN — IOHEXOL 75 ML: 350 INJECTION, SOLUTION INTRAVENOUS at 05:02

## 2022-02-23 RX ADMIN — CEFTRIAXONE 1 G: 1 INJECTION, SOLUTION INTRAVENOUS at 07:02

## 2022-02-23 NOTE — PHARMACY MED REC
"  Admission Medication History     The home medication history was taken by Jennie Irvin CPhT.    Medication history obtained from Patient      You may go to "Admission" then "Reconcile Home Medications" tabs to review and/or act upon these items.      The home medication list has been updated by the Pharmacy department.    Please read ALL comments highlighted in yellow.    Please address this information as you see fit.     Feel free to contact us if you have any questions or require assistance.      The medications listed below were removed from the home medication list.  Please reorder if appropriate:  Patient reports no longer taking the following medication(s):   Calcitrate 200mg      Jennie Irvin CPhT.  [phone]                .          "

## 2022-02-23 NOTE — ED PROVIDER NOTES
"Encounter Date: 2022    SCRIBE #1 NOTE: I, Fide Lacy, am scribing for, and in the presence of, GENESIS Candelaria.       History     Chief Complaint   Patient presents with    Abdominal Pain     Symptoms started last week, but denies nausea, vomiting diarrhea. Last Bm cannot remember. Possibly constipation     Time seen by provider: 3:40 PM on 2022    Soni Harris is a 88 y.o. female who presents to the ED with an onset of intermittent abdominal pain for 1 week.  Patient reports no N/V/D or fever.  She states the pain is persistent even with her changing to a "bland diet" including removing dairy and coffee as recommended by her PCP.  Pepto-Bismol has been taken for abdominal pain with mild improvement.  Patient expresses the pain occurs daily and notes that today the pain was bad enough to cause her to cry.  Abdominal pain is greatest in the RLQ.  Patient states her last BM was the day before yesterday.  She denies any other symptoms at this time.  Patient has a PMHx of HTN, GERD, dementia and arthritis.  PSHx includes hysterectomy.      The history is provided by the patient.     Review of patient's allergies indicates:  No Known Allergies  Past Medical History:   Diagnosis Date    Arthritis     GERD (gastroesophageal reflux disease)     Glaucoma (increased eye pressure)     Hypertension      Past Surgical History:   Procedure Laterality Date    HYSTERECTOMY       No family history on file.  Social History     Tobacco Use    Smoking status: Former Smoker     Types: Cigarettes     Quit date: 2003     Years since quittin.4    Smokeless tobacco: Never Used   Substance Use Topics    Alcohol use: No    Drug use: No     Review of Systems   Constitutional: Negative for fever.   HENT: Negative for sore throat.    Respiratory: Negative for shortness of breath.    Cardiovascular: Negative for chest pain.   Gastrointestinal: Positive for abdominal pain. Negative for diarrhea, nausea " and vomiting.   Genitourinary: Negative for dysuria.   Musculoskeletal: Negative for back pain.   Skin: Negative for rash.   Neurological: Negative for weakness.   Hematological: Does not bruise/bleed easily.       Physical Exam     Initial Vitals [02/23/22 1528]   BP Pulse Resp Temp SpO2   135/64 78 18 98.3 °F (36.8 °C) 100 %      MAP       --         Physical Exam    Nursing note and vitals reviewed.  Constitutional: Vital signs are normal. She appears well-developed and well-nourished.   HENT:   Head: Normocephalic and atraumatic.   Eyes: Pupils are equal, round, and reactive to light.   Neck: Neck supple.   Cardiovascular: Normal rate, regular rhythm, normal heart sounds and intact distal pulses. Exam reveals no gallop and no friction rub.    No murmur heard.  Pulmonary/Chest: Breath sounds normal. She has no wheezes. She has no rhonchi. She has no rales.   Abdominal: Abdomen is soft. Bowel sounds are normal. She exhibits no distension and no mass. There is abdominal tenderness in the right lower quadrant.   RLQ tenderness with palpation.   No right CVA tenderness.  No left CVA tenderness. There is no rebound and no guarding.   Genitourinary: Rectum:      No tenderness, external hemorrhoid, internal hemorrhoid or abnormal anal tone.      Genitourinary Comments: Rectal exam with no signs of impaction.     Musculoskeletal:      Cervical back: Neck supple.     Neurological: She is alert and oriented to person, place, and time. She has normal strength.   Skin: Skin is warm, dry and intact.   Psychiatric: She has a normal mood and affect. Her speech is normal and behavior is normal.         ED Course   Procedures  Labs Reviewed   CBC W/ AUTO DIFFERENTIAL - Abnormal; Notable for the following components:       Result Value    RBC 3.56 (*)     Hemoglobin 10.9 (*)     Hematocrit 33.7 (*)     All other components within normal limits   COMPREHENSIVE METABOLIC PANEL - Abnormal; Notable for the following components:     Albumin 3.2 (*)     All other components within normal limits   URINALYSIS, REFLEX TO URINE CULTURE - Abnormal; Notable for the following components:    Appearance, UA Cloudy (*)     Occult Blood UA Trace (*)     Nitrite, UA Positive (*)     Leukocytes, UA 2+ (*)     All other components within normal limits    Narrative:     Specimen Source->Urine   URINALYSIS MICROSCOPIC - Abnormal; Notable for the following components:    RBC, UA 9 (*)     WBC, UA 57 (*)     Bacteria Few (*)     All other components within normal limits    Narrative:     Specimen Source->Urine   CULTURE, URINE   LIPASE          Imaging Results          CT Abdomen Pelvis With Contrast (Edited Result - FINAL)  Result time 02/23/22 18:08:26    Addendum 1 of 1 by Anant Mendoza Jr., MD (02/23/22 18:08:26)      There is a small left inguinal hernia containing a loop of small bowel without CT evidence of obstruction.      Electronically signed by: Anant Mendoza MD  Date:    02/23/2022  Time:    18:08                 Final result by Anant Mendoza Jr., MD (02/23/22 18:00:22)                 Impression:      Diverticulosis coli without CT evidence of diverticulitis.  Prior granulomatous disease with multiple calcified granulomas of the liver spleen lung bases and mediastinum.  Neither a normal or abnormal appendix is identified.  Prior hysterectomy.  Small hiatal hernia.  A      Electronically signed by: Anant Mendoza MD  Date:    02/23/2022  Time:    18:00             Narrative:    EXAMINATION:  CT ABDOMEN PELVIS WITH CONTRAST    CLINICAL HISTORY:  RLQ abdominal pain (Age >= 14y);    TECHNIQUE:  Low dose axial images, sagittal and coronal reformations were obtained from the lung bases to the pubic symphysis following the IV administration of 75 mL of Omnipaque 350 and the oral administration of 30 mL of Omnipaque 350.    COMPARISON:  None.    FINDINGS:  There are multiple calcified granulomas in the lung bases and multiple calcified lymph  nodes in the mediastinum.  There is a small hiatal hernia.    The liver is of normal size and general CT density.  There are too numerous to count calcified granulomas in the liver.  There are too numerous to count calcified granulomas in the spleen as well.  The spleen is of normal size.  The gallbladder is of normal size without CT evidence of stone.  The pancreas is of normal contour and CT density without edema or mass.    The adrenal glands are not enlarged.  The kidneys are of normal size contour and contrast enhancement.  An intrarenal stone or hydronephrosis is not noted on either side.  There is heavy calcification of the abdominal aorta.  Retroperitoneal adenopathy is not identified.    Aside from the hiatal hernia the stomach appears of normal configuration.  Small bowel dilatation or air-fluid levels are not seen.  Contrasted stool is noted throughout the colon.  The colon is not distended.  Free fluid or free air is not seen.  Neither a normal or abnormal appendix is identified.  There is diverticulosis identified in the sigmoid colon without CT evidence of diverticulitis.    The bladder is of normal contour without mass or asymmetry.  A uterus is not seen.                                 Medications   iohexoL (OMNIPAQUE 350) injection 75 mL (75 mLs Intravenous Given 2/23/22 1708)   cefTRIAXone (ROCEPHIN) 1 g/50 mL D5W IVPB (0 g Intravenous Stopped 2/23/22 1947)     Medical Decision Making:   History:   Old Medical Records: I decided to obtain old medical records.  Differential Diagnosis:   Appendicitis  Colitis  constipation   Clinical Tests:   Lab Tests: Ordered and Reviewed       APC / Resident Notes:   Patient is a 88 y.o. female who presents to the ED 02/24/2022 who underwent emergent evaluation for abdominal pain for 1 week.  Patient had a bowel movement yesterday but does report some mild constipation.  She has mild right lower quadrant tenderness without guarding.  Her pain resolves  spontaneously in the emergency department and she requested no pain medication.  She has no nausea or vomiting and is tolerating p.o..  Vital signs normal with the exception of mild hypertension.  Labs noted are unremarkable.  Patient does appear to have a UTI.  She has no CVA tenderness.  She is not vomiting.  I do not think pyelonephritis.  She does not appear to be septic.  No leukocytosis.  She is not febrile.  She has no tachycardia.  Patient is given antibiotics for her UTI.  CT of abdomen pelvis without acute findings.  I do not think acute appendicitis, diverticulitis, perforation, abscess, or other acute process. Based on my clinical evaluation, I do not appreciate any immediate, emergent, or life threatening condition or etiology that warrants additional workup today and feel that the patient can be discharged with close follow up care.Follow up and return precautions discussed; patient verbalized understanding and is agreeable to plan of care. Patient discharged home in stable condition.              Scribe Attestation:   Scribe #1: I performed the above scribed service and the documentation accurately describes the services I performed. I attest to the accuracy of the note.    Attending Attestation:           Physician Attestation for Scribe:  Physician Attestation Statement for Scribe #1: I, Nelia Carias, reviewed documentation, as scribed by in my presence, and it is both accurate and complete.     Comments: I, GENESIS Candelaria, personally performed the services described in this documentation. All medical record entries made by the scribe were at my direction and in my presence.  I have reviewed the chart and agree that the record reflects my personal performance and is accurate and complete. GENESIS Candelaria.  10:07 AM 02/24/2022                     Clinical Impression:   Final diagnoses:  [N30.01] Acute cystitis with hematuria (Primary)  [K59.00] Constipation, unspecified constipation type           ED Disposition Condition    Discharge Stable        ED Prescriptions     Medication Sig Dispense Start Date End Date Auth. Provider    amoxicillin-clavulanate 875-125mg (AUGMENTIN) 875-125 mg per tablet Take 1 tablet by mouth 2 (two) times daily. 14 tablet 2/23/2022  Nelia Carias NP    polyethylene glycol (GLYCOLAX) 17 gram PwPk Take 17 g by mouth 2 (two) times daily. for 5 days 10 each 2/23/2022 2/28/2022 Nelia Carias NP    docusate sodium (COLACE) 100 MG capsule Take 1 capsule (100 mg total) by mouth 2 (two) times daily as needed for Constipation. 30 capsule 2/23/2022  Nelia Carias NP    docusate sodium (COLACE) 100 MG capsule Take 1 capsule (100 mg total) by mouth once daily. 30 capsule 2/23/2022  Nelia Carias NP        Follow-up Information     Follow up With Specialties Details Why Contact Info    Fran Gilbert MD Family Medicine In 2 days  [address] [facility] [id number] [phone]      [facility] Emergency Dept Emergency Medicine  As needed, If symptoms worsen [address] [place] [id number] [phone]           Nelia Carias NP  02/24/22 1459

## 2022-02-24 NOTE — ED NOTES
Pt to ed for abdominal pain X 1 week, worse in the rlq.  She has been eating a bland diet.    LOC: Patient name and date of birth verified. The patient is awake, alert and aware of environment with an appropriate affect, the patient is oriented x 3 and speaking appropriately.   APPEARANCE: Patient resting comfortably, patient is clean and well groomed, patient's clothing is properly fastened.  SKIN: The skin is warm and dry, color consistent with ethnicity, patient has normal skin turgor and moist mucus membranes, skin intact, no breakdown or bruising noted.  MUSCULOSKELETAL: Patient moving all extremities well, no obvious swelling or deformities noted.   RESPIRATORY: Respirations are spontaneous, patient has a normal effort and rate, no accessory muscle use noted.  CARDIAC: Patient has a normal rate and rhythm, no periphreal edema noted, capillary refill < 3 seconds.  ABDOMEN: Soft and no distention noted. Tender on palpation, worse in right lower quadrant.  Bowel sounds present in all four quadrants.  NEUROLOGIC: Eyes open spontaneously, behavior appropriate to situation, follows commands, facial expression symmetrical, bilateral hand grasp equal and even, purposeful motor response noted, normal sensation in all extremities when touched with a finger.

## 2022-02-25 LAB — BACTERIA UR CULT: ABNORMAL

## 2022-10-05 ENCOUNTER — HOSPITAL ENCOUNTER (INPATIENT)
Facility: HOSPITAL | Age: 87
LOS: 3 days | Discharge: HOME HEALTH | DRG: 481 | End: 2022-10-08
Attending: EMERGENCY MEDICINE | Admitting: HOSPITALIST
Payer: MEDICARE

## 2022-10-05 ENCOUNTER — ANESTHESIA (OUTPATIENT)
Dept: SURGERY | Facility: HOSPITAL | Age: 87
DRG: 481 | End: 2022-10-05
Payer: MEDICARE

## 2022-10-05 ENCOUNTER — ANESTHESIA EVENT (OUTPATIENT)
Dept: SURGERY | Facility: HOSPITAL | Age: 87
DRG: 481 | End: 2022-10-05
Payer: MEDICARE

## 2022-10-05 DIAGNOSIS — M25.551 RIGHT HIP PAIN: ICD-10-CM

## 2022-10-05 DIAGNOSIS — R07.9 CHEST PAIN: ICD-10-CM

## 2022-10-05 DIAGNOSIS — I63.9 CEREBRAL INFARCTION, UNSPECIFIED MECHANISM: ICD-10-CM

## 2022-10-05 DIAGNOSIS — S72.001A CLOSED FRACTURE OF NECK OF RIGHT FEMUR, INITIAL ENCOUNTER: Primary | ICD-10-CM

## 2022-10-05 DIAGNOSIS — G30.1 LATE ONSET ALZHEIMER'S DEMENTIA WITH BEHAVIORAL DISTURBANCE: ICD-10-CM

## 2022-10-05 DIAGNOSIS — S72.001A CLOSED FRACTURE OF RIGHT HIP, INITIAL ENCOUNTER: ICD-10-CM

## 2022-10-05 DIAGNOSIS — F02.818 LATE ONSET ALZHEIMER'S DEMENTIA WITH BEHAVIORAL DISTURBANCE: ICD-10-CM

## 2022-10-05 LAB
ANION GAP SERPL CALC-SCNC: 7 MMOL/L (ref 8–16)
BACTERIA #/AREA URNS HPF: ABNORMAL /HPF
BASOPHILS # BLD AUTO: 0.06 K/UL (ref 0–0.2)
BASOPHILS NFR BLD: 0.7 % (ref 0–1.9)
BILIRUB UR QL STRIP: NEGATIVE
BUN SERPL-MCNC: 11 MG/DL (ref 8–23)
CALCIUM SERPL-MCNC: 8.8 MG/DL (ref 8.7–10.5)
CHLORIDE SERPL-SCNC: 110 MMOL/L (ref 95–110)
CK SERPL-CCNC: 83 U/L (ref 20–180)
CLARITY UR: ABNORMAL
CO2 SERPL-SCNC: 21 MMOL/L (ref 23–29)
COLOR UR: YELLOW
CREAT SERPL-MCNC: 0.7 MG/DL (ref 0.5–1.4)
DIFFERENTIAL METHOD: ABNORMAL
EOSINOPHIL # BLD AUTO: 0.1 K/UL (ref 0–0.5)
EOSINOPHIL NFR BLD: 1.2 % (ref 0–8)
ERYTHROCYTE [DISTWIDTH] IN BLOOD BY AUTOMATED COUNT: 13.8 % (ref 11.5–14.5)
EST. GFR  (NO RACE VARIABLE): >60 ML/MIN/1.73 M^2
GLUCOSE SERPL-MCNC: 121 MG/DL (ref 70–110)
GLUCOSE UR QL STRIP: NEGATIVE
HCT VFR BLD AUTO: 32.1 % (ref 37–48.5)
HGB BLD-MCNC: 10.8 G/DL (ref 12–16)
HGB UR QL STRIP: NEGATIVE
IMM GRANULOCYTES # BLD AUTO: 0.02 K/UL (ref 0–0.04)
IMM GRANULOCYTES NFR BLD AUTO: 0.2 % (ref 0–0.5)
KETONES UR QL STRIP: NEGATIVE
LEUKOCYTE ESTERASE UR QL STRIP: ABNORMAL
LYMPHOCYTES # BLD AUTO: 1.8 K/UL (ref 1–4.8)
LYMPHOCYTES NFR BLD: 20.9 % (ref 18–48)
MCH RBC QN AUTO: 30.8 PG (ref 27–31)
MCHC RBC AUTO-ENTMCNC: 33.6 G/DL (ref 32–36)
MCV RBC AUTO: 92 FL (ref 82–98)
MICROSCOPIC COMMENT: ABNORMAL
MONOCYTES # BLD AUTO: 0.9 K/UL (ref 0.3–1)
MONOCYTES NFR BLD: 10 % (ref 4–15)
NEUTROPHILS # BLD AUTO: 5.8 K/UL (ref 1.8–7.7)
NEUTROPHILS NFR BLD: 67 % (ref 38–73)
NITRITE UR QL STRIP: NEGATIVE
NRBC BLD-RTO: 0 /100 WBC
PH UR STRIP: 7 [PH] (ref 5–8)
PLATELET # BLD AUTO: 155 K/UL (ref 150–450)
PMV BLD AUTO: 11.3 FL (ref 9.2–12.9)
POCT GLUCOSE: 134 MG/DL (ref 70–110)
POTASSIUM SERPL-SCNC: 4 MMOL/L (ref 3.5–5.1)
PROT UR QL STRIP: NEGATIVE
RBC # BLD AUTO: 3.51 M/UL (ref 4–5.4)
RBC #/AREA URNS HPF: 1 /HPF (ref 0–4)
SARS-COV-2 RDRP RESP QL NAA+PROBE: NEGATIVE
SODIUM SERPL-SCNC: 138 MMOL/L (ref 136–145)
SP GR UR STRIP: 1.02 (ref 1–1.03)
URN SPEC COLLECT METH UR: ABNORMAL
UROBILINOGEN UR STRIP-ACNC: ABNORMAL EU/DL
WBC # BLD AUTO: 8.68 K/UL (ref 3.9–12.7)
WBC #/AREA URNS HPF: 2 /HPF (ref 0–5)

## 2022-10-05 PROCEDURE — U0002 COVID-19 LAB TEST NON-CDC: HCPCS | Performed by: EMERGENCY MEDICINE

## 2022-10-05 PROCEDURE — 37000008 HC ANESTHESIA 1ST 15 MINUTES: Performed by: ORTHOPAEDIC SURGERY

## 2022-10-05 PROCEDURE — 80048 BASIC METABOLIC PNL TOTAL CA: CPT | Performed by: EMERGENCY MEDICINE

## 2022-10-05 PROCEDURE — 63600175 PHARM REV CODE 636 W HCPCS: Performed by: ORTHOPAEDIC SURGERY

## 2022-10-05 PROCEDURE — D9220A PRA ANESTHESIA: ICD-10-PCS | Mod: ANES,,, | Performed by: ANESTHESIOLOGY

## 2022-10-05 PROCEDURE — 63600175 PHARM REV CODE 636 W HCPCS: Performed by: NURSE ANESTHETIST, CERTIFIED REGISTERED

## 2022-10-05 PROCEDURE — 25000003 PHARM REV CODE 250: Performed by: ANESTHESIOLOGY

## 2022-10-05 PROCEDURE — 27000221 HC OXYGEN, UP TO 24 HOURS

## 2022-10-05 PROCEDURE — 25000003 PHARM REV CODE 250

## 2022-10-05 PROCEDURE — 63600175 PHARM REV CODE 636 W HCPCS: Performed by: EMERGENCY MEDICINE

## 2022-10-05 PROCEDURE — 82550 ASSAY OF CK (CPK): CPT | Performed by: NURSE PRACTITIONER

## 2022-10-05 PROCEDURE — D9220A PRA ANESTHESIA: Mod: CRNA,,, | Performed by: NURSE ANESTHETIST, CERTIFIED REGISTERED

## 2022-10-05 PROCEDURE — 36000711: Performed by: ORTHOPAEDIC SURGERY

## 2022-10-05 PROCEDURE — 27235 PR PERCUT FIX PROX/NECK FEMUR FX: ICD-10-PCS | Mod: RT,,, | Performed by: ORTHOPAEDIC SURGERY

## 2022-10-05 PROCEDURE — 36415 COLL VENOUS BLD VENIPUNCTURE: CPT | Performed by: NURSE PRACTITIONER

## 2022-10-05 PROCEDURE — 63600175 PHARM REV CODE 636 W HCPCS: Performed by: ANESTHESIOLOGY

## 2022-10-05 PROCEDURE — 36415 COLL VENOUS BLD VENIPUNCTURE: CPT | Performed by: EMERGENCY MEDICINE

## 2022-10-05 PROCEDURE — 96374 THER/PROPH/DIAG INJ IV PUSH: CPT

## 2022-10-05 PROCEDURE — 12000002 HC ACUTE/MED SURGE SEMI-PRIVATE ROOM

## 2022-10-05 PROCEDURE — 71000033 HC RECOVERY, INTIAL HOUR: Performed by: ORTHOPAEDIC SURGERY

## 2022-10-05 PROCEDURE — 71000039 HC RECOVERY, EACH ADD'L HOUR: Performed by: ORTHOPAEDIC SURGERY

## 2022-10-05 PROCEDURE — C1769 GUIDE WIRE: HCPCS | Performed by: ORTHOPAEDIC SURGERY

## 2022-10-05 PROCEDURE — 85025 COMPLETE CBC W/AUTO DIFF WBC: CPT | Performed by: EMERGENCY MEDICINE

## 2022-10-05 PROCEDURE — 99900104 DSU ONLY-NO CHARGE-EA ADD'L HR (STAT): Performed by: ORTHOPAEDIC SURGERY

## 2022-10-05 PROCEDURE — 81000 URINALYSIS NONAUTO W/SCOPE: CPT | Performed by: NURSE PRACTITIONER

## 2022-10-05 PROCEDURE — 27235 TREAT THIGH FRACTURE: CPT | Mod: RT,,, | Performed by: ORTHOPAEDIC SURGERY

## 2022-10-05 PROCEDURE — 27201423 OPTIME MED/SURG SUP & DEVICES STERILE SUPPLY: Performed by: ORTHOPAEDIC SURGERY

## 2022-10-05 PROCEDURE — 37000009 HC ANESTHESIA EA ADD 15 MINS: Performed by: ORTHOPAEDIC SURGERY

## 2022-10-05 PROCEDURE — D9220A PRA ANESTHESIA: Mod: ANES,,, | Performed by: ANESTHESIOLOGY

## 2022-10-05 PROCEDURE — 99900103 DSU ONLY-NO CHARGE-INITIAL HR (STAT): Performed by: ORTHOPAEDIC SURGERY

## 2022-10-05 PROCEDURE — 36000710: Performed by: ORTHOPAEDIC SURGERY

## 2022-10-05 PROCEDURE — 25000003 PHARM REV CODE 250: Performed by: ORTHOPAEDIC SURGERY

## 2022-10-05 PROCEDURE — 25000003 PHARM REV CODE 250: Performed by: NURSE ANESTHETIST, CERTIFIED REGISTERED

## 2022-10-05 PROCEDURE — 94760 N-INVAS EAR/PLS OXIMETRY 1: CPT

## 2022-10-05 PROCEDURE — C1713 ANCHOR/SCREW BN/BN,TIS/BN: HCPCS | Performed by: ORTHOPAEDIC SURGERY

## 2022-10-05 PROCEDURE — D9220A PRA ANESTHESIA: ICD-10-PCS | Mod: CRNA,,, | Performed by: NURSE ANESTHETIST, CERTIFIED REGISTERED

## 2022-10-05 PROCEDURE — 99284 EMERGENCY DEPT VISIT MOD MDM: CPT | Mod: 25

## 2022-10-05 DEVICE — SCREW CANN 32MM THRD 6.5X75MM: Type: IMPLANTABLE DEVICE | Site: HIP | Status: FUNCTIONAL

## 2022-10-05 DEVICE — SCREW CANN 16MM X 80MM: Type: IMPLANTABLE DEVICE | Site: HIP | Status: FUNCTIONAL

## 2022-10-05 DEVICE — SCREW CANN 16MM THRD 6.5X75 SS: Type: IMPLANTABLE DEVICE | Site: HIP | Status: FUNCTIONAL

## 2022-10-05 RX ORDER — ONDANSETRON 2 MG/ML
4 INJECTION INTRAMUSCULAR; INTRAVENOUS EVERY 8 HOURS PRN
Status: DISCONTINUED | OUTPATIENT
Start: 2022-10-05 | End: 2022-10-08 | Stop reason: HOSPADM

## 2022-10-05 RX ORDER — ATORVASTATIN CALCIUM 20 MG/1
20 TABLET, FILM COATED ORAL DAILY
Status: DISCONTINUED | OUTPATIENT
Start: 2022-10-05 | End: 2022-10-08 | Stop reason: HOSPADM

## 2022-10-05 RX ORDER — SODIUM CHLORIDE 0.9 % (FLUSH) 0.9 %
10 SYRINGE (ML) INJECTION
Status: DISCONTINUED | OUTPATIENT
Start: 2022-10-05 | End: 2022-10-08 | Stop reason: HOSPADM

## 2022-10-05 RX ORDER — SODIUM,POTASSIUM PHOSPHATES 280-250MG
2 POWDER IN PACKET (EA) ORAL
Status: DISCONTINUED | OUTPATIENT
Start: 2022-10-05 | End: 2022-10-08 | Stop reason: HOSPADM

## 2022-10-05 RX ORDER — MUPIROCIN 20 MG/G
OINTMENT TOPICAL
Status: DISCONTINUED | OUTPATIENT
Start: 2022-10-05 | End: 2022-10-05

## 2022-10-05 RX ORDER — ACETAMINOPHEN 325 MG/1
650 TABLET ORAL EVERY 8 HOURS PRN
Status: DISCONTINUED | OUTPATIENT
Start: 2022-10-05 | End: 2022-10-08 | Stop reason: HOSPADM

## 2022-10-05 RX ORDER — MORPHINE SULFATE 2 MG/ML
2 INJECTION, SOLUTION INTRAMUSCULAR; INTRAVENOUS ONCE
Status: COMPLETED | OUTPATIENT
Start: 2022-10-05 | End: 2022-10-05

## 2022-10-05 RX ORDER — INSULIN ASPART 100 [IU]/ML
0-5 INJECTION, SOLUTION INTRAVENOUS; SUBCUTANEOUS
Status: DISCONTINUED | OUTPATIENT
Start: 2022-10-05 | End: 2022-10-08 | Stop reason: HOSPADM

## 2022-10-05 RX ORDER — GLUCAGON 1 MG
1 KIT INJECTION
Status: DISCONTINUED | OUTPATIENT
Start: 2022-10-05 | End: 2022-10-08 | Stop reason: HOSPADM

## 2022-10-05 RX ORDER — OXYCODONE HYDROCHLORIDE 10 MG/1
10 TABLET ORAL EVERY 4 HOURS PRN
Status: DISCONTINUED | OUTPATIENT
Start: 2022-10-05 | End: 2022-10-08 | Stop reason: HOSPADM

## 2022-10-05 RX ORDER — ACETAMINOPHEN 650 MG/1
650 SUPPOSITORY RECTAL EVERY 4 HOURS PRN
Status: DISCONTINUED | OUTPATIENT
Start: 2022-10-05 | End: 2022-10-08 | Stop reason: HOSPADM

## 2022-10-05 RX ORDER — PANTOPRAZOLE SODIUM 40 MG/1
40 TABLET, DELAYED RELEASE ORAL DAILY
Status: DISCONTINUED | OUTPATIENT
Start: 2022-10-05 | End: 2022-10-08 | Stop reason: HOSPADM

## 2022-10-05 RX ORDER — MEMANTINE HYDROCHLORIDE 5 MG/1
10 TABLET ORAL DAILY
Status: DISCONTINUED | OUTPATIENT
Start: 2022-10-05 | End: 2022-10-08 | Stop reason: HOSPADM

## 2022-10-05 RX ORDER — LIDOCAINE HCL/PF 100 MG/5ML
SYRINGE (ML) INTRAVENOUS
Status: DISCONTINUED | OUTPATIENT
Start: 2022-10-05 | End: 2022-10-05

## 2022-10-05 RX ORDER — FENTANYL CITRATE 50 UG/ML
25 INJECTION, SOLUTION INTRAMUSCULAR; INTRAVENOUS EVERY 5 MIN PRN
Status: DISCONTINUED | OUTPATIENT
Start: 2022-10-05 | End: 2022-10-05

## 2022-10-05 RX ORDER — PROPOFOL 10 MG/ML
VIAL (ML) INTRAVENOUS
Status: DISCONTINUED | OUTPATIENT
Start: 2022-10-05 | End: 2022-10-05

## 2022-10-05 RX ORDER — IBUPROFEN 200 MG
16 TABLET ORAL
Status: DISCONTINUED | OUTPATIENT
Start: 2022-10-05 | End: 2022-10-08 | Stop reason: HOSPADM

## 2022-10-05 RX ORDER — OXYCODONE HYDROCHLORIDE 5 MG/1
5 TABLET ORAL EVERY 6 HOURS PRN
Status: DISCONTINUED | OUTPATIENT
Start: 2022-10-05 | End: 2022-10-05

## 2022-10-05 RX ORDER — TALC
6 POWDER (GRAM) TOPICAL NIGHTLY PRN
Status: DISCONTINUED | OUTPATIENT
Start: 2022-10-05 | End: 2022-10-08 | Stop reason: HOSPADM

## 2022-10-05 RX ORDER — CEFAZOLIN SODIUM 2 G/50ML
2 SOLUTION INTRAVENOUS
Status: COMPLETED | OUTPATIENT
Start: 2022-10-06 | End: 2022-10-06

## 2022-10-05 RX ORDER — ENOXAPARIN SODIUM 100 MG/ML
40 INJECTION SUBCUTANEOUS EVERY 24 HOURS
Status: DISCONTINUED | OUTPATIENT
Start: 2022-10-06 | End: 2022-10-07

## 2022-10-05 RX ORDER — LANOLIN ALCOHOL/MO/W.PET/CERES
800 CREAM (GRAM) TOPICAL
Status: DISCONTINUED | OUTPATIENT
Start: 2022-10-05 | End: 2022-10-08 | Stop reason: HOSPADM

## 2022-10-05 RX ORDER — SODIUM CHLORIDE 0.9 % (FLUSH) 0.9 %
10 SYRINGE (ML) INJECTION EVERY 12 HOURS PRN
Status: DISCONTINUED | OUTPATIENT
Start: 2022-10-05 | End: 2022-10-05

## 2022-10-05 RX ORDER — HYDROCODONE BITARTRATE AND ACETAMINOPHEN 5; 325 MG/1; MG/1
1 TABLET ORAL
Status: DISCONTINUED | OUTPATIENT
Start: 2022-10-05 | End: 2022-10-05

## 2022-10-05 RX ORDER — MAG HYDROX/ALUMINUM HYD/SIMETH 200-200-20
30 SUSPENSION, ORAL (FINAL DOSE FORM) ORAL 4 TIMES DAILY PRN
Status: DISCONTINUED | OUTPATIENT
Start: 2022-10-05 | End: 2022-10-08 | Stop reason: HOSPADM

## 2022-10-05 RX ORDER — HYDROCODONE BITARTRATE AND ACETAMINOPHEN 5; 325 MG/1; MG/1
1 TABLET ORAL EVERY 4 HOURS PRN
Status: DISCONTINUED | OUTPATIENT
Start: 2022-10-05 | End: 2022-10-08 | Stop reason: HOSPADM

## 2022-10-05 RX ORDER — FENTANYL CITRATE 50 UG/ML
INJECTION, SOLUTION INTRAMUSCULAR; INTRAVENOUS
Status: DISCONTINUED | OUTPATIENT
Start: 2022-10-05 | End: 2022-10-05

## 2022-10-05 RX ORDER — METOPROLOL SUCCINATE 50 MG/1
50 TABLET, EXTENDED RELEASE ORAL DAILY
Status: DISCONTINUED | OUTPATIENT
Start: 2022-10-05 | End: 2022-10-08 | Stop reason: HOSPADM

## 2022-10-05 RX ORDER — ASPIRIN 325 MG
325 TABLET ORAL DAILY
Status: DISCONTINUED | OUTPATIENT
Start: 2022-10-05 | End: 2022-10-05

## 2022-10-05 RX ORDER — METOCLOPRAMIDE HYDROCHLORIDE 5 MG/ML
10 INJECTION INTRAMUSCULAR; INTRAVENOUS EVERY 10 MIN PRN
Status: DISCONTINUED | OUTPATIENT
Start: 2022-10-05 | End: 2022-10-05 | Stop reason: SDUPTHER

## 2022-10-05 RX ORDER — IBUPROFEN 200 MG
24 TABLET ORAL
Status: DISCONTINUED | OUTPATIENT
Start: 2022-10-05 | End: 2022-10-08 | Stop reason: HOSPADM

## 2022-10-05 RX ORDER — ONDANSETRON HYDROCHLORIDE 2 MG/ML
INJECTION, SOLUTION INTRAMUSCULAR; INTRAVENOUS
Status: DISCONTINUED | OUTPATIENT
Start: 2022-10-05 | End: 2022-10-05

## 2022-10-05 RX ORDER — MORPHINE SULFATE 2 MG/ML
2 INJECTION, SOLUTION INTRAMUSCULAR; INTRAVENOUS
Status: COMPLETED | OUTPATIENT
Start: 2022-10-05 | End: 2022-10-05

## 2022-10-05 RX ORDER — NALOXONE HCL 0.4 MG/ML
0.02 VIAL (ML) INJECTION
Status: DISCONTINUED | OUTPATIENT
Start: 2022-10-05 | End: 2022-10-08 | Stop reason: HOSPADM

## 2022-10-05 RX ORDER — OXYCODONE HYDROCHLORIDE 5 MG/1
5 TABLET ORAL
Status: DISCONTINUED | OUTPATIENT
Start: 2022-10-05 | End: 2022-10-05

## 2022-10-05 RX ORDER — CEFAZOLIN SODIUM 2 G/50ML
2 SOLUTION INTRAVENOUS
Status: COMPLETED | OUTPATIENT
Start: 2022-10-05 | End: 2022-10-05

## 2022-10-05 RX ORDER — HYDROMORPHONE HYDROCHLORIDE 2 MG/ML
0.2 INJECTION, SOLUTION INTRAMUSCULAR; INTRAVENOUS; SUBCUTANEOUS EVERY 5 MIN PRN
Status: DISCONTINUED | OUTPATIENT
Start: 2022-10-05 | End: 2022-10-05

## 2022-10-05 RX ORDER — MUPIROCIN 20 MG/G
OINTMENT TOPICAL 2 TIMES DAILY
Status: DISCONTINUED | OUTPATIENT
Start: 2022-10-05 | End: 2022-10-08 | Stop reason: HOSPADM

## 2022-10-05 RX ORDER — DONEPEZIL HYDROCHLORIDE 5 MG/1
10 TABLET, FILM COATED ORAL DAILY
Status: DISCONTINUED | OUTPATIENT
Start: 2022-10-05 | End: 2022-10-08 | Stop reason: HOSPADM

## 2022-10-05 RX ORDER — METOPROLOL TARTRATE 25 MG/1
22 TABLET, FILM COATED ORAL DAILY
Status: DISCONTINUED | OUTPATIENT
Start: 2022-10-05 | End: 2022-10-05 | Stop reason: SDUPTHER

## 2022-10-05 RX ORDER — MORPHINE SULFATE 4 MG/ML
4 INJECTION, SOLUTION INTRAMUSCULAR; INTRAVENOUS EVERY 4 HOURS PRN
Status: DISCONTINUED | OUTPATIENT
Start: 2022-10-05 | End: 2022-10-05

## 2022-10-05 RX ORDER — AMLODIPINE BESYLATE 5 MG/1
5 TABLET ORAL DAILY
Status: DISCONTINUED | OUTPATIENT
Start: 2022-10-05 | End: 2022-10-08 | Stop reason: HOSPADM

## 2022-10-05 RX ORDER — SODIUM CHLORIDE 0.9 % (FLUSH) 0.9 %
10 SYRINGE (ML) INJECTION
Status: DISCONTINUED | OUTPATIENT
Start: 2022-10-05 | End: 2022-10-05

## 2022-10-05 RX ORDER — ONDANSETRON 8 MG/1
8 TABLET, ORALLY DISINTEGRATING ORAL EVERY 8 HOURS PRN
Status: DISCONTINUED | OUTPATIENT
Start: 2022-10-05 | End: 2022-10-08 | Stop reason: HOSPADM

## 2022-10-05 RX ORDER — METOCLOPRAMIDE HYDROCHLORIDE 5 MG/ML
10 INJECTION INTRAMUSCULAR; INTRAVENOUS EVERY 10 MIN PRN
Status: DISCONTINUED | OUTPATIENT
Start: 2022-10-05 | End: 2022-10-05

## 2022-10-05 RX ORDER — PHENYLEPHRINE HYDROCHLORIDE 10 MG/ML
INJECTION INTRAVENOUS
Status: DISCONTINUED | OUTPATIENT
Start: 2022-10-05 | End: 2022-10-05

## 2022-10-05 RX ORDER — LOSARTAN POTASSIUM 25 MG/1
50 TABLET ORAL DAILY
Status: DISCONTINUED | OUTPATIENT
Start: 2022-10-05 | End: 2022-10-08 | Stop reason: HOSPADM

## 2022-10-05 RX ORDER — SODIUM CHLORIDE 9 MG/ML
INJECTION, SOLUTION INTRAVENOUS CONTINUOUS
Status: DISCONTINUED | OUTPATIENT
Start: 2022-10-05 | End: 2022-10-08 | Stop reason: HOSPADM

## 2022-10-05 RX ADMIN — PHENYLEPHRINE HYDROCHLORIDE 200 MCG: 10 INJECTION INTRAVENOUS at 03:10

## 2022-10-05 RX ADMIN — FENTANYL CITRATE 50 MCG: 50 INJECTION, SOLUTION INTRAMUSCULAR; INTRAVENOUS at 04:10

## 2022-10-05 RX ADMIN — HYDROMORPHONE HYDROCHLORIDE 0.2 MG: 2 INJECTION INTRAMUSCULAR; INTRAVENOUS; SUBCUTANEOUS at 05:10

## 2022-10-05 RX ADMIN — SODIUM CHLORIDE: 0.9 INJECTION, SOLUTION INTRAVENOUS at 03:10

## 2022-10-05 RX ADMIN — OXYCODONE 5 MG: 5 TABLET ORAL at 05:10

## 2022-10-05 RX ADMIN — MORPHINE SULFATE 2 MG: 2 INJECTION, SOLUTION INTRAMUSCULAR; INTRAVENOUS at 06:10

## 2022-10-05 RX ADMIN — CEFAZOLIN SODIUM 2 G: 2 SOLUTION INTRAVENOUS at 03:10

## 2022-10-05 RX ADMIN — SODIUM CHLORIDE: 0.9 INJECTION, SOLUTION INTRAVENOUS at 09:10

## 2022-10-05 RX ADMIN — PHENYLEPHRINE HYDROCHLORIDE 200 MCG: 10 INJECTION INTRAVENOUS at 04:10

## 2022-10-05 RX ADMIN — MORPHINE SULFATE 2 MG: 2 INJECTION, SOLUTION INTRAMUSCULAR; INTRAVENOUS at 03:10

## 2022-10-05 RX ADMIN — SODIUM CHLORIDE: 0.9 INJECTION, SOLUTION INTRAVENOUS at 04:10

## 2022-10-05 RX ADMIN — PHENYLEPHRINE HYDROCHLORIDE 100 MCG: 10 INJECTION INTRAVENOUS at 04:10

## 2022-10-05 RX ADMIN — MUPIROCIN: 20 OINTMENT TOPICAL at 08:10

## 2022-10-05 RX ADMIN — PROPOFOL 150 MG: 10 INJECTION, EMULSION INTRAVENOUS at 03:10

## 2022-10-05 RX ADMIN — LIDOCAINE HYDROCHLORIDE 75 MG: 20 INJECTION INTRAVENOUS at 03:10

## 2022-10-05 RX ADMIN — ONDANSETRON 4 MG: 2 INJECTION, SOLUTION INTRAMUSCULAR; INTRAVENOUS at 03:10

## 2022-10-05 RX ADMIN — PHENYLEPHRINE HYDROCHLORIDE 100 MCG: 10 INJECTION INTRAVENOUS at 03:10

## 2022-10-05 RX ADMIN — OXYCODONE 5 MG: 5 TABLET ORAL at 09:10

## 2022-10-05 NOTE — ASSESSMENT & PLAN NOTE
Chronic, controlled.  Latest blood pressure and vitals reviewed-   Temp:  [97.8 °F (36.6 °C)-98.8 °F (37.1 °C)]   Pulse:  []   Resp:  [16-18]   BP: (131-174)/(61-79)   SpO2:  [98 %-100 %] .   Home meds for hypertension were reviewed and noted below.   Hypertension Medications             amlodipine (NORVASC) 2.5 MG tablet Take 2.5 mg by mouth once daily.    amLODIPine (NORVASC) 5 MG tablet Take 5 mg by mouth once daily.    losartan (COZAAR) 50 MG tablet Take 50 mg by mouth once daily.    metoprolol succinate (TOPROL-XL) 50 MG 24 hr tablet Take 50 mg by mouth once daily.    metoprolol tartrate (LOPRESSOR) 25 MG tablet Take 22 mg by mouth once daily.          While in the hospital, will manage blood pressure as follows; Continue home antihypertensive regimen    Will utilize p.r.n. blood pressure medication only if patient's blood pressure greater than  180/110 and she develops symptoms such as worsening chest pain or shortness of breath.

## 2022-10-05 NOTE — PT/OT/SLP PROGRESS
Physical Therapy      Patient Name:  Soni Harris   MRN:  [id number]    PT orders acknowledged- pt with R femoral neck fx and awaiting surgery. Please re consult post op.

## 2022-10-05 NOTE — HOSPITAL COURSE
Soni Franco a 88 year old black female with a PMHx significant for dementia, HTN, CVA, and CVD presenting for right hip pain following a fall. Patient was monitored closely throughout course of hospital stay by hospital medicine team. Orthopedic consulted for nondisplaced transverse fracture of right femoral neck. Patient held NPO for surgery with Dr. Jhaveri on 10/5. Patient started on prn pain medication. PT/OT consulted for post op evaluation.  Patient had an episode of non redirectable agitation and telepsych consult was placed.  The patient's family worked closely with Case Management to determine the best disposition for the patient.  The family ultimately decided the patient would go home with home health.  Patient was seen by surgeon recommended ASA 81 b.i.d. for 30 days for DVT prophylaxis. Patient to be discharged home with home health and prn pain medication. Wheelchair delivered prior to discharge. Patient and family verbalized understanding of discharge instructions and return precautions.

## 2022-10-05 NOTE — PLAN OF CARE
Plan of care reviewed with pt, pt verbalized understanding. Pt had unwitnessed fall at home. Right hip fracture surgical correction today with Dr. Jhaveri.  mL/hr. Pain controlled with ordered medications. Call light within reach, bed locked and in lowest position, side rails x2, pt instructed to call for assistance.

## 2022-10-05 NOTE — HPI
Soni Harris is a 88 year old black female with a PMHx significant for dementia without behavioral disturbances, HTN, CVA, and CVD presenting for evaluation of right hip pain following a fall at home last night. Patient unable to provide appropriate history due to baseline dementia. History obtained for daughter at bedside. Daughter reports patient lives at home with her. She states patient fell around 9 o clock last night while attempting to get out of bed. Daughter states she heard patient calling for help immediately after the fall. Daughter states patient has habit of getting out of bed multiple times throughout night. Patient reports her right hip pain as a 5/10 without radiation. ED workup significant for right hip xray showing nondisplaced transverse fracture of the right femoral neck. ED provider spoke to orthopedic surgeon on call who recommended NPO for surgery later today. Plan to start on prn pain medications with PT/OT consult postoperatively. Patient will be admitted to inpatient medical services for continued monitoring and treatment.

## 2022-10-05 NOTE — PLAN OF CARE
[facility]  Initial Discharge Assessment       Primary Care Provider: Fran Gilbert MD    Admission Diagnosis: Right hip pain [M25.551]  Closed fracture of neck of right femur, initial encounter [S72.001A]    Admission Date: 10/5/2022  Expected Discharge Date:     Discharge Barriers Identified: None    Payor: [employer] / Plan: rPath [id number] / Product Type: Medicare Advantage /     Extended Emergency Contact Information  Primary Emergency Contact: Polina Bermanerie  Address: [address] [place] [address] [address]  Home Phone: [phone]  Mobile Phone: [phone]  Relation: Daughter  Preferred language: English   needed? No    Discharge Plan A: Home Health, Skilled Nursing Facility  Discharge Plan B: Home with family      [facility] [place] - [address]  [address]  [place] [address]  Phone: [phone] Fax: [phone]      Completed DC assessment with pt's family at bedside. Pt not able to participate in assessment due to confused at baseline. Florencia (daughter) verified information on facesheet as correct. Reports that pt lives at listed address with herself and her  (pt's son-in-law). Reports being pt's POA (requested copy). Verified PCP as Dr. Gilbert. Pharm is [facility]. Denies hh/hd/blood thinners. DME- rw, cane, rollator. Reports that pt can ambulate on her own but needs assistance with dressing/bathing. Florencia reports being pt's main caregiver. Florencia or pt's other daughter provide transportation to [facility] [place] and Florencia will likely be the one to provide transportation home upon DC. Verified insurance on file. Denies recent inpt stay in last 30 days. DC plan is SNF VS HH. CM following     Initial Assessment (most recent)       Adult Discharge Assessment - 10/05/22 1206          Discharge Assessment    Assessment Type Discharge Planning Assessment      Confirmed/corrected address, phone number and insurance Yes     Confirmed Demographics Correct on Facesheet     Source of Information patient     Communicated TRIXIE with patient/caregiver Yes     Reason For Admission fracture of rt hip     Lives With child(brittany), adult     Facility Arrived From: home     Do you expect to return to your current living situation? No     Do you have help at home or someone to help you manage your care at home? Yes     Who are your caregiver(s) and their phone number(s)? daughter, Florencia     Prior to hospitilization cognitive status: Not Oriented to Place;Not Oriented to Time     Current cognitive status: Not Oriented to Place;Not Oriented to Time     Walking or Climbing Stairs Difficulty none     Dressing/Bathing Difficulty none     Equipment Currently Used at Home walker, rolling;cane, straight;rollator     Readmission within 30 days? No     Patient currently being followed by outpatient case management? No     Do you currently have service(s) that help you manage your care at home? No     Do you take prescription medications? Yes     Do you have prescription coverage? Yes     Coverage PHN     Do you have any problems affording any of your prescribed medications? No     Is the patient taking medications as prescribed? yes     How do you get to doctors appointments? family or friend will provide     Are you on dialysis? No     Do you take coumadin? No     Discharge Plan A Home Health;Skilled Nursing Facility     Discharge Plan B Home with family     DME Needed Upon Discharge  none     Discharge Plan discussed with: Patient     Discharge Barriers Identified None

## 2022-10-05 NOTE — NURSING
Report received from MERRY Le. Pt arrived to floor via stretcher. Pt oriented to room and call light. Bed alarm set. Call light within reach.

## 2022-10-05 NOTE — ASSESSMENT & PLAN NOTE
Dementia at baseline  Chronic  Resume home medications  Delirium precautions  Follows neurology outpatient

## 2022-10-05 NOTE — ED PROVIDER NOTES
Encounter Date: 10/5/2022       History     Chief Complaint   Patient presents with    Hip Pain     88 y.o. female to ED via EMS with c.o. right sided hip pain. Per EMS patient's family found patient on the ground after a fall, the helped the patient up and got her into bed but the patient was c.o. of right sided hip pain so they called EMS. Patient states the pain is worst when moving. Patient is a poor historian and has dementia. Patient oriented to self and place, disoriented to time and situation. 2+ pedal pulses bilaterally no shortening or rotation noted.     88-year-old with dementia presents for hip pain.  Apparently there was a fall.  Patient cannot give any history and denies pain at this time.  She has no complaints.    The history is provided by the patient. History limited by: Dementia.   Review of patient's allergies indicates:  No Known Allergies  Past Medical History:   Diagnosis Date    Arthritis     GERD (gastroesophageal reflux disease)     Glaucoma (increased eye pressure)     Hypertension      Past Surgical History:   Procedure Laterality Date    HYSTERECTOMY       Family History   Family history unknown: Yes     Social History     Tobacco Use    Smoking status: Former     Types: Cigarettes     Quit date: 2003     Years since quittin.0    Smokeless tobacco: Never   Substance Use Topics    Alcohol use: No    Drug use: No     Review of Systems   Unable to perform ROS: Dementia     Physical Exam     Initial Vitals   BP Pulse Resp Temp SpO2   10/05/22 0345 10/05/22 0345 10/05/22 0345 10/05/22 0344 10/05/22 0345   (!) 174/79 103 18 98.8 °F (37.1 °C) 100 %      MAP       --                Physical Exam    Nursing note and vitals reviewed.  Constitutional: She appears well-developed and well-nourished. She is not diaphoretic. No distress.   HENT:   Head: Normocephalic and atraumatic.   Eyes: EOM are normal.   Neck: Neck supple.   Normal range of motion.  Cardiovascular:  Normal rate, regular  rhythm and normal heart sounds.     Exam reveals no gallop and no friction rub.       No murmur heard.  Pulmonary/Chest: Breath sounds normal. No respiratory distress. She has no wheezes. She has no rhonchi. She has no rales.   Musculoskeletal:         General: Normal range of motion.      Cervical back: Normal range of motion and neck supple.      Right hip: Normal. No deformity, lacerations, tenderness, bony tenderness or crepitus. Normal range of motion. Normal strength.      Comments: Full range of motion in the right hip, no tenderness     Neurological: She is alert.   Skin: Skin is warm and dry.   Psychiatric: She has a normal mood and affect. Her behavior is normal. Judgment and thought content normal.       ED Course   Procedures  Labs Reviewed   CBC W/ AUTO DIFFERENTIAL - Abnormal; Notable for the following components:       Result Value    RBC 3.51 (*)     Hemoglobin 10.8 (*)     Hematocrit 32.1 (*)     All other components within normal limits   BASIC METABOLIC PANEL   SARS-COV-2 RNA AMPLIFICATION, QUAL   URINALYSIS, REFLEX TO URINE CULTURE          Imaging Results              X-Ray Hip 2 or 3 views Right (with Pelvis when performed) (In process)                      Medications   morphine injection 2 mg (2 mg Intravenous Given 10/5/22 0619)                 ED Course as of 10/05/22 0642   Wed Oct 05, 2022   0420 Hip fx? Vrads to read. [EF]   0613 IMPRESSION:  Acute nondisplaced right hip femoral neck fracture.  Thank you for allowing us to participate in the care of your patient.  Dictated and Authenticated by: Jr. Mckeon Neil, MD  10/05/2022 6:12 AM Central Time ([place] & Tim [EF]   0615 J varma with hospital medicine to admit for hip fx [EF]   0622 Case d/w dr montemayor, npo, he will operate today [EF]      ED Course User Index  [EF] Reji Fuller MD                 Clinical Impression:   Final diagnoses:  [M25.551] Right hip pain        ED Disposition Condition    Observation Stable                  88-year-old female presents to the ER with right hip pain.  No pain at this time in the emergency room on initial exam.  She has a full range of motion in the right hip without tenderness but x-ray demonstrates a nondisplaced fracture.  Case discussed with orthopedics and Hospital Medicine she will be admitted for surgery later today.  No other apparent traumatic injury.     Reji Fuller MD  10/05/22 0659

## 2022-10-05 NOTE — PLAN OF CARE
Discussed SNF with family at bedside. Explained SNF and answered questions. Family is not sold on idea due to pt's dementia and worried about how pt will do out of her element. I explained that we will see how she does with therapy after surgery and then go from there. I explained that even if PT/OT rec SNF that it doesn't mean pt has to go as long as they feel like they can care for her at home. States they will start looking over facilities and then will see how pt does. RAÚL provided a list from [facility] for local SNFs.        10/05/22 121   Post-Acute Status   Post-Acute Authorization Placement   Post-Acute Placement Status Patient List Provided   Patient choice form signed by patient/caregiver List from System Post-Acute Care

## 2022-10-05 NOTE — CONSULTS
Patient ID: Soni Harris is a 88 y.o. female    Chief Complaint: right hip pain     History of Present Illness:    Soni Harris is a pleasant 88 y.o. female with dementia who presented to the ED with a chief complaint of right hip pain. This occurred after a fall from standing height. Patient was taken to the ED and X-rays revealed a right femoral neck fx. She was then admitted to the medicine service for optimization with orthopedic consultation.     Social Status: lives at home with daughter  Ambulatory Status: walks without assistive device  Pertinent Surgical/Medical Hx: dementia     PAST MEDICAL HISTORY:   Past Medical History:   Diagnosis Date    Arthritis     GERD (gastroesophageal reflux disease)     Glaucoma (increased eye pressure)     Hypertension      PAST SURGICAL HISTORY:   Past Surgical History:   Procedure Laterality Date    EYE SURGERY      HYSTERECTOMY       FAMILY HISTORY:   Family History   Family history unknown: Yes     SOCIAL HISTORY:   Social History     Occupational History    Not on file   Tobacco Use    Smoking status: Former     Types: Cigarettes     Quit date: 2003     Years since quittin.0    Smokeless tobacco: Never   Substance and Sexual Activity    Alcohol use: No    Drug use: No    Sexual activity: Not on file        MEDICATIONS: Reviewed per Epic   ALLERGIES: Review of patient's allergies indicates:  No Known Allergies    Review of Systems:  Constitutional: no fever or chills  Eyes: no visual changes  ENT: no nasal congestion or sore throat  Respiratory: no cough or shortness of breath  Cardiovascular: no chest pain  Gastrointestinal: no nausea or vomiting, tolerating diet  Musculoskeletal: pain and soreness  All others negative        Physical Exam     Vitals:    10/05/22 1200   BP: (!) 158/68   Pulse: 86   Resp: 16   Temp: 97.2 °F (36.2 °C)     Alert and oriented to person, place and time. No acute distress. Well-groomed, not ill appearing. Pupils round and  reactive, normal respiratory effort, no audible wheezing.     Hip Exam    Global tenderness to palpation of the right hip  + Logroll  Leg held in shortened/externally rotated position  No evidence of open fracture, no masses/lesions/surgical scars  Neurovascularly intact: intact peroneal, tibial, sciatic nerve function   Palpable distal pulses  Compartments soft and compressible  Warm well perfused extremity, brisk cap refill to toes       Imagin view right Hip X-rays ordered/reviewed by me showing nondisplaced femoral neck fracture       Assessment    88 y.o. female with   Right femoral neck fracture    Plan    --Admit to Internal Medicine service for pre-operative clearance and medical evaluation.   --To OR today for operative fixation of right hip with percutaneous pinning  --Written and verbal consent obtained from patients family   --Pre-operative labs and imaging reviewed by me   --Bed rest, read, NPO     Risks and complications were discussed including but not limited to the risks of anesthetic complications, infection, wound healing complications, non-union, mal-union, hardware failure, pain, stiffness, DVT, pulmonary embolism, perioperative medical risks (cardiac, pulmonary, renal, neurologic), and death among others were discussed. No guarantees were made and the patient and family elect to proceed. They fully understand the reported 30% mortality risk within the first year of surgery.

## 2022-10-05 NOTE — OP NOTE
DATE OF PROCEDURE: 10/05/2022    PREOPERATIVE DIAGNOSIS: Impacted right  femoral neck fracture.    POSTOPERATIVE DIAGNOSIS: Impacted right  femoral neck fracture.    PROCEDURE: Percutaneous pinning, right femoral neck fracture (CPT #38651).    SURGEON: Tad Jhaveri M.D.    ASSISTANT: Ramez Sanchez PAC    She was present and scrubbed for the entirety of the procedure. They were required for patient positioning, retraction, insertion of implants and closure. Without her I would have been unable to safely perform the case due to only one scrub tech available.     ANESTHESIA: General.    ESTIMATED BLOOD LOSS: 25.    Specimen: None    INDICATIONS: The patient is a 88 y.o. female who sustained a fall.  She sustained a femoral neck fracture Treatment options were discussed and it was recommended to proceed with percutaneous pinning of this to minimize the risk of loss of reduction and displacement of the fracture. Risks and complications were discussed including, but not limited to the risks of anesthetic complications, infections, wound healing complications, DVT, nonunion, malunion, AVN and death and she elected to proceed.    PROCEDURE IN DETAIL: The patient was taken to the Operating Room where general anesthesia was administered by the Anesthesia Department.She was placed on the fracture table and the contralateral lower extremity was placed in the well leg paul.   All superficial neurovascular structures were well padded and the right leg was placed in the traction boot. Satisfactory reduction was confirmed under fluoroscopy and the hip was sterilely prepped and draped in a normal fashion.    Under fluoroscopic guidance, a guidepin was through the lateral cortex of the femur and into the femoral head in the inferior central position. This was done under fluoroscopic guidance. Two further guidepins were then placed superior anterior and superior posterior under fluoroscopic guidance for an inverted  triangle. Each was measured and then over reamed and 2 6.5 mm partially threaded cancellous screws and one inferior 7.3mm screw were placed over the guidepins and the guidepins were then removed. Real time fluoroscopy was used in the AP and lateral planes and the approach withdrawal method to ensure no pin penetration.    Wounds were then irrigated and they were closed with interrupted inverted stitches of #2-0 Vicryl and staples. Sterile dressing was then applied. General anesthesia was then reversed and she was returned to the Postanesthesia Care Unit in stable condition.    Disposition: TTWB, DVT PPX, Follow up in my clinic in 2 weeks with x-rays of the operative hip.

## 2022-10-05 NOTE — NURSING
NS infusing at 100 mL/hr, Pt on 2 liters of oxygen via nasal cannula stating 94-96%. Pt denies pain and nausea at this time. Dressing on right hip clean,dry, and intact. Pt resting peacefully at this time.

## 2022-10-05 NOTE — PT/OT/SLP DISCHARGE
Occupational Therapy Discharge Summary    Soni Harris  MRN: [id number]   Principal Problem: Closed fracture of right hip      Patient Discharged from acute Occupational Therapy on 10/05/2022.    Assessment:       Patient with femoral neck fracture and awaiting orthopedic consult.    Objective:     GOALS:   Multidisciplinary Problems       Occupational Therapy Goals       Not on file                    Reasons for Discontinuation of Therapy Services  Patient with R femoral fracture and awaiting orthopedic consult.       Plan:     Patient Discharged to:  In house awaiting orthopedic consult.    10/5/2022

## 2022-10-05 NOTE — PLAN OF CARE
142 received. Uploaded to [employer]     10/05/22 6029   Post-Acute Status   Post-Acute Authorization Placement

## 2022-10-05 NOTE — BRIEF OP NOTE
[facility]  Brief Operative Note    SUMMARY     Surgery Date: 10/5/2022     Surgeon(s) and Role:     * Tad Jhaveri MD - Primary    Assisting Surgeon: None    Pre-op Diagnosis:  Closed fracture of neck of right femur, initial encounter [S72.001A]    Post-op Diagnosis:  Post-Op Diagnosis Codes:     * Closed fracture of neck of right femur, initial encounter [S72.001A]    Procedure(s) (LRB):  PINNING, HIP, PERCUTANEOUS (SYNTHES 6.5 Cannulated screws) -- fracture table -- C arm from opposite side (Right)    Anesthesia: General    Operative Findings: transcervical femoral neck fx. Some interval displacement after transfer to table from pre-op imaging    Estimated Blood Loss: 25cc           Specimens:   Specimen (24h ago, onward)      None            [id number]

## 2022-10-05 NOTE — PLAN OF CARE
PT TO PRE-OP FROM ROOM VIA  BED. PT HAS DEMENTIA, FAMILY AT BEDSIDE . PT NOT C/O PAIN AT THIS TIME. RIGHT LEG NOTED SHORTED AND ROTATED EXTERNALLY. MD AT BEDSIDE SPEAKING WITH FAMILY

## 2022-10-05 NOTE — H&P
[facility] Medicine  History & Physical    Patient Name: Soni Harris  MRN: [id number]  Patient Class: IP- Inpatient  Admission Date: 10/5/2022  Attending Physician: Piotr Villalpando MD   Primary Care Provider: Fran Gilbert MD         Patient information was obtained from relative(s) and ER records.     Subjective:     Principal Problem:Closed fracture of right hip    Chief Complaint:   Chief Complaint   Patient presents with    Hip Pain     88 y.o. female to ED via EMS with c.o. right sided hip pain. Per EMS patient's family found patient on the ground after a fall, the helped the patient up and got her into bed but the patient was c.o. of right sided hip pain so they called EMS. Patient states the pain is worst when moving. Patient is a poor historian and has dementia. Patient oriented to self and place, disoriented to time and situation. 2+ pedal pulses bilaterally no shortening or rotation noted.        HPI: Soni Harris is a 88 year old black female with a PMHx significant for dementia without behavioral disturbances, HTN, CVA, and CVD presenting for evaluation of right hip pain following a fall at home last night. Patient unable to provide appropriate history due to baseline dementia. History obtained for daughter at bedside. Daughter reports patient lives at home with her. She states patient fell around 9 o clock last night while attempting to get out of bed. Daughter states she heard patient calling for help immediately after the fall. Daughter states patient has habit of getting out of bed multiple times throughout night. Patient reports her right hip pain as a 5/10 without radiation. ED workup significant for right hip xray showing nondisplaced transverse fracture of the right femoral neck. ED provider spoke to orthopedic surgeon on call who recommended NPO for surgery later today. Plan to start on prn pain medications with PT/OT consult postoperatively. Patient  will be admitted to inpatient medical services for continued monitoring and treatment.       Past Medical History:   Diagnosis Date    Arthritis     GERD (gastroesophageal reflux disease)     Glaucoma (increased eye pressure)     Hypertension        Past Surgical History:   Procedure Laterality Date    HYSTERECTOMY         Review of patient's allergies indicates:  No Known Allergies    No current facility-administered medications on file prior to encounter.     Current Outpatient Medications on File Prior to Encounter   Medication Sig    alendronate (FOSAMAX) 5 MG Tab Take 5 mg by mouth before breakfast.    amlodipine (NORVASC) 2.5 MG tablet Take 2.5 mg by mouth once daily.    amLODIPine (NORVASC) 5 MG tablet Take 5 mg by mouth once daily.    amoxicillin-clavulanate 875-125mg (AUGMENTIN) 875-125 mg per tablet Take 1 tablet by mouth 2 (two) times daily.    aspirin 325 MG tablet Take 1 tablet (325 mg total) by mouth once daily.    atorvastatin (LIPITOR) 20 MG tablet Take 20 mg by mouth once daily.    calcium citrate (CALCITRATE) 200 mg (950 mg) tablet Take 1 tablet by mouth 2 (two) times daily.    diclofenac sodium (VOLTAREN) 1 % Gel Apply 2 g topically 4 (four) times daily.    docusate sodium (COLACE) 100 MG capsule Take 1 capsule (100 mg total) by mouth 2 (two) times daily as needed for Constipation.    docusate sodium (COLACE) 100 MG capsule Take 1 capsule (100 mg total) by mouth once daily.    donepeziL (ARICEPT) 10 MG tablet Take 10 mg by mouth once daily.    losartan (COZAAR) 50 MG tablet Take 50 mg by mouth once daily.    memantine (NAMENDA) 10 MG Tab Take 10 mg by mouth once daily.    metoprolol succinate (TOPROL-XL) 50 MG 24 hr tablet Take 50 mg by mouth once daily.    metoprolol tartrate (LOPRESSOR) 25 MG tablet Take 22 mg by mouth once daily.    msm-aloe vera-herbal66-emu oil (DEEP BLUE RELIEF) Gel Apply topically as needed.    multivit-min-FA-lycopen-lutein 0.4-300-250 mg-mcg-mcg Tab  Take by mouth.    mv-minerals/FA/omega 3,6,9 #3 (WOMEN'S 50+ ADVANCED ORAL) Take 1 tablet by mouth Daily.    omega 3-dha-epa-fish oil 1,000 mg (120 mg-180 mg) Cap 1 capsule    omeprazole (PRILOSEC) 20 MG capsule Take 20 mg by mouth once daily.    risedronate (ACTONEL) 5 MG tablet Take 5 mg by mouth once a week.     Family History       Family history is unknown by patient.          Tobacco Use    Smoking status: Former     Types: Cigarettes     Quit date: 2003     Years since quittin.0    Smokeless tobacco: Never   Substance and Sexual Activity    Alcohol use: No    Drug use: No    Sexual activity: Not on file     Review of Systems   Unable to perform ROS: Dementia   Objective:     Vital Signs (Most Recent):  Temp: 97.8 °F (36.6 °C) (10/05/22 0842)  Pulse: 88 (10/05/22 0842)  Resp: 17 (10/05/22 0908)  BP: (!) 163/77 (10/05/22 0842)  SpO2: 98 % (10/05/22 0842)   Vital Signs (24h Range):  Temp:  [97.8 °F (36.6 °C)-98.8 °F (37.1 °C)] 97.8 °F (36.6 °C)  Pulse:  [] 88  Resp:  [16-18] 17  SpO2:  [98 %-100 %] 98 %  BP: (131-174)/(61-79) 163/77     Weight: 60.4 kg (133 lb 3.2 oz)  Body mass index is 22.86 kg/m².    Physical Exam  Vitals and nursing note reviewed.   Constitutional:       General: She is not in acute distress.     Appearance: She is ill-appearing.   Cardiovascular:      Rate and Rhythm: Normal rate and regular rhythm.      Pulses: Normal pulses.      Heart sounds: Normal heart sounds. No murmur heard.    No gallop.   Pulmonary:      Effort: Pulmonary effort is normal. No respiratory distress.      Breath sounds: Normal breath sounds. No wheezing or rales.   Abdominal:      General: Abdomen is flat. There is no distension.      Palpations: Abdomen is soft.      Tenderness: There is no abdominal tenderness.   Musculoskeletal:         General: Tenderness present. Normal range of motion.      Comments: Tenderness to right lateral hip and pain with passive ROM of right leg, no obvious  deformity   Skin:     General: Skin is warm.      Coloration: Skin is not jaundiced.      Findings: No bruising.   Neurological:      General: No focal deficit present.      Mental Status: She is alert. She is disoriented.      Comments: Dementia at baseline           Significant Labs: All pertinent labs within the past 24 hours have been reviewed.  CBC:   Recent Labs   Lab 10/05/22  0624   WBC 8.68   HGB 10.8*   HCT 32.1*        CMP:   Recent Labs   Lab 10/05/22  0624      K 4.0      CO2 21*   *   BUN 11   CREATININE 0.7   CALCIUM 8.8   ANIONGAP 7*     Urine Studies:   Recent Labs   Lab 10/05/22  0648   COLORU Yellow   APPEARANCEUA Hazy*   PHUR 7.0   SPECGRAV 1.020   PROTEINUA Negative   GLUCUA Negative   KETONESU Negative   BILIRUBINUA Negative   OCCULTUA Negative   NITRITE Negative   UROBILINOGEN 2.0-3.0*   LEUKOCYTESUR Trace*   RBCUA 1   WBCUA 2   BACTERIA Moderate*       Significant Imaging: I have reviewed all pertinent imaging results/findings within the past 24 hours.    Assessment/Plan:     * Closed fracture of right hip  Acute right hip pain following fall  Orthopedics consulted  Plan for OR today  PRN pain medication  PT/OT consult post operatively  Fall precautions  Right hip xray showing Nondisplaced transverse fracture of the right femoral neck    Late onset Alzheimer's dementia with behavioral disturbance  Dementia at baseline  Chronic  Resume home medications  Delirium precautions  Follows neurology outpatient      CVD (cardiovascular disease)  Chronic  Resume home medications        GERD (gastroesophageal reflux disease)  Chronic  Resume home PPI      Hypertension  Chronic, controlled.  Latest blood pressure and vitals reviewed-   Temp:  [97.8 °F (36.6 °C)-98.8 °F (37.1 °C)]   Pulse:  []   Resp:  [16-18]   BP: (131-174)/(61-79)   SpO2:  [98 %-100 %] .   Home meds for hypertension were reviewed and noted below.   Hypertension Medications             amlodipine  (NORVASC) 2.5 MG tablet Take 2.5 mg by mouth once daily.    amLODIPine (NORVASC) 5 MG tablet Take 5 mg by mouth once daily.    losartan (COZAAR) 50 MG tablet Take 50 mg by mouth once daily.    metoprolol succinate (TOPROL-XL) 50 MG 24 hr tablet Take 50 mg by mouth once daily.    metoprolol tartrate (LOPRESSOR) 25 MG tablet Take 22 mg by mouth once daily.          While in the hospital, will manage blood pressure as follows; Continue home antihypertensive regimen    Will utilize p.r.n. blood pressure medication only if patient's blood pressure greater than  180/110 and she develops symptoms such as worsening chest pain or shortness of breath.        CVA (cerebral infarction)  History of CVA  Chronic, resume home ASA      VTE Risk Mitigation (From admission, onward)         Ordered     IP VTE HIGH RISK PATIENT  Once         10/05/22 0855     Place sequential compression device  Until discontinued         10/05/22 0855     Reason for No Pharmacological VTE Prophylaxis  Once        Question:  Reasons:  Answer:  Risk of Bleeding    10/05/22 0855                   Tyron Roach PA-C  Department of Hospital Medicine   [facility]

## 2022-10-05 NOTE — ANESTHESIA PREPROCEDURE EVALUATION
10/05/2022  Soni Harris is a 88 y.o., female.      Pre-op Assessment    I have reviewed the Patient Summary Reports.     I have reviewed the Nursing Notes. I have reviewed the NPO Status.   I have reviewed the Medications.     Review of Systems  Anesthesia Hx:  Denies Family Hx of Anesthesia complications.   Denies Personal Hx of Anesthesia complications.   Hematology/Oncology:         -- Anemia:   Cardiovascular:   Hypertension, poorly controlled ECG has been reviewed.    Hepatic/GI:   GERD, well controlled    Neurological:   CVA, residual symptoms Headaches    Psych:   Psychiatric History          Physical Exam  General: Cooperative    Airway:  Mallampati: III / II  Mouth Opening: Normal  TM Distance: Normal  Tongue: Normal  Neck ROM: Extension Decreased    Dental:  Edentulous  Few teeth remain  Chest/Lungs:  Normal Respiratory Rate    Heart:  Rate: Normal  Rhythm: Regular Rhythm        Anesthesia Plan  Type of Anesthesia, risks & benefits discussed:    Anesthesia Type: Gen Supraglottic Airway  Intra-op Monitoring Plan: Standard ASA Monitors  Post Op Pain Control Plan: multimodal analgesia  Induction:  IV  Airway Plan: , Post-Induction  Informed Consent: Informed consent signed with the Patient representative and all parties understand the risks and agree with anesthesia plan.  All questions answered.   ASA Score: 3    Ready For Surgery From Anesthesia Perspective.     .

## 2022-10-05 NOTE — PLAN OF CARE
Completed Locet; PASSR faxed in. Awaiting 142.     10/05/22 7005   Post-Acute Status   Post-Acute Authorization Placement   Post-Acute Placement Status Pending state direction/certification

## 2022-10-05 NOTE — SUBJECTIVE & OBJECTIVE
Past Medical History:   Diagnosis Date    Arthritis     GERD (gastroesophageal reflux disease)     Glaucoma (increased eye pressure)     Hypertension        Past Surgical History:   Procedure Laterality Date    HYSTERECTOMY         Review of patient's allergies indicates:  No Known Allergies    No current facility-administered medications on file prior to encounter.     Current Outpatient Medications on File Prior to Encounter   Medication Sig    alendronate (FOSAMAX) 5 MG Tab Take 5 mg by mouth before breakfast.    amlodipine (NORVASC) 2.5 MG tablet Take 2.5 mg by mouth once daily.    amLODIPine (NORVASC) 5 MG tablet Take 5 mg by mouth once daily.    amoxicillin-clavulanate 875-125mg (AUGMENTIN) 875-125 mg per tablet Take 1 tablet by mouth 2 (two) times daily.    aspirin 325 MG tablet Take 1 tablet (325 mg total) by mouth once daily.    atorvastatin (LIPITOR) 20 MG tablet Take 20 mg by mouth once daily.    calcium citrate (CALCITRATE) 200 mg (950 mg) tablet Take 1 tablet by mouth 2 (two) times daily.    diclofenac sodium (VOLTAREN) 1 % Gel Apply 2 g topically 4 (four) times daily.    docusate sodium (COLACE) 100 MG capsule Take 1 capsule (100 mg total) by mouth 2 (two) times daily as needed for Constipation.    docusate sodium (COLACE) 100 MG capsule Take 1 capsule (100 mg total) by mouth once daily.    donepeziL (ARICEPT) 10 MG tablet Take 10 mg by mouth once daily.    losartan (COZAAR) 50 MG tablet Take 50 mg by mouth once daily.    memantine (NAMENDA) 10 MG Tab Take 10 mg by mouth once daily.    metoprolol succinate (TOPROL-XL) 50 MG 24 hr tablet Take 50 mg by mouth once daily.    metoprolol tartrate (LOPRESSOR) 25 MG tablet Take 22 mg by mouth once daily.    msm-aloe vera-herbal66-emu oil (DEEP BLUE RELIEF) Gel Apply topically as needed.    multivit-min-FA-lycopen-lutein 0.4-300-250 mg-mcg-mcg Tab Take by mouth.    mv-minerals/FA/omega 3,6,9 #3 (WOMEN'S 50+ ADVANCED ORAL) Take 1 tablet by mouth Daily.    omega  3-dha-epa-fish oil 1,000 mg (120 mg-180 mg) Cap 1 capsule    omeprazole (PRILOSEC) 20 MG capsule Take 20 mg by mouth once daily.    risedronate (ACTONEL) 5 MG tablet Take 5 mg by mouth once a week.     Family History       Family history is unknown by patient.          Tobacco Use    Smoking status: Former     Types: Cigarettes     Quit date: 2003     Years since quittin.0    Smokeless tobacco: Never   Substance and Sexual Activity    Alcohol use: No    Drug use: No    Sexual activity: Not on file     Review of Systems   Unable to perform ROS: Dementia   Objective:     Vital Signs (Most Recent):  Temp: 97.8 °F (36.6 °C) (10/05/22 0842)  Pulse: 88 (10/05/22 0842)  Resp: 17 (10/05/22 0908)  BP: (!) 163/77 (10/05/22 0842)  SpO2: 98 % (10/05/22 0842)   Vital Signs (24h Range):  Temp:  [97.8 °F (36.6 °C)-98.8 °F (37.1 °C)] 97.8 °F (36.6 °C)  Pulse:  [] 88  Resp:  [16-18] 17  SpO2:  [98 %-100 %] 98 %  BP: (131-174)/(61-79) 163/77     Weight: 60.4 kg (133 lb 3.2 oz)  Body mass index is 22.86 kg/m².    Physical Exam  Vitals and nursing note reviewed.   Constitutional:       General: She is not in acute distress.     Appearance: She is ill-appearing.   Cardiovascular:      Rate and Rhythm: Normal rate and regular rhythm.      Pulses: Normal pulses.      Heart sounds: Normal heart sounds. No murmur heard.    No gallop.   Pulmonary:      Effort: Pulmonary effort is normal. No respiratory distress.      Breath sounds: Normal breath sounds. No wheezing or rales.   Abdominal:      General: Abdomen is flat. There is no distension.      Palpations: Abdomen is soft.      Tenderness: There is no abdominal tenderness.   Musculoskeletal:         General: Tenderness present. Normal range of motion.      Comments: Tenderness to right lateral hip and pain with passive ROM of right leg, no obvious deformity   Skin:     General: Skin is warm.      Coloration: Skin is not jaundiced.      Findings: No bruising.   Neurological:       General: No focal deficit present.      Mental Status: She is alert. She is disoriented.      Comments: Dementia at baseline           Significant Labs: All pertinent labs within the past 24 hours have been reviewed.  CBC:   Recent Labs   Lab 10/05/22  0624   WBC 8.68   HGB 10.8*   HCT 32.1*        CMP:   Recent Labs   Lab 10/05/22  0624      K 4.0      CO2 21*   *   BUN 11   CREATININE 0.7   CALCIUM 8.8   ANIONGAP 7*     Urine Studies:   Recent Labs   Lab 10/05/22  0648   COLORU Yellow   APPEARANCEUA Hazy*   PHUR 7.0   SPECGRAV 1.020   PROTEINUA Negative   GLUCUA Negative   KETONESU Negative   BILIRUBINUA Negative   OCCULTUA Negative   NITRITE Negative   UROBILINOGEN 2.0-3.0*   LEUKOCYTESUR Trace*   RBCUA 1   WBCUA 2   BACTERIA Moderate*       Significant Imaging: I have reviewed all pertinent imaging results/findings within the past 24 hours.

## 2022-10-05 NOTE — ANESTHESIA POSTPROCEDURE EVALUATION
Anesthesia Post Evaluation    Patient: Soni Harris    Procedure(s) Performed: Procedure(s) (LRB):  PINNING, HIP, PERCUTANEOUS (SYNTHES 6.5 Cannulated screws) -- fracture table -- C arm from opposite side (Right)    Final Anesthesia Type: general      Patient location during evaluation: PACU  Patient participation: Yes- Able to Participate  Level of consciousness: awake and alert  Post-procedure vital signs: reviewed and stable  Pain management: adequate  Airway patency: patent    PONV status at discharge: No PONV  Anesthetic complications: no      Cardiovascular status: blood pressure returned to baseline  Respiratory status: unassisted  Hydration status: euvolemic  Follow-up not needed.          Vitals Value Taken Time   /77 10/05/22 1737   Temp 37.1 °C (98.8 °F) 10/05/22 1650   Pulse 70 10/05/22 1737   Resp 11 10/05/22 1737   SpO2 100 % 10/05/22 1737   Vitals shown include unvalidated device data.      No case tracking events are documented in the log.      Pain/Patricia Score: Pain Rating Prior to Med Admin: 7 (unable to give number moaning) (10/5/2022  5:27 PM)  Pain Rating Post Med Admin: 2 (10/5/2022 10:08 AM)  Patricia Score: 7 (10/5/2022  4:46 PM)

## 2022-10-05 NOTE — ANESTHESIA PROCEDURE NOTES
Intubation    Date/Time: 10/5/2022 3:38 PM  Performed by: Chung Marcus CRNA  Authorized by: Jaun Rodriguez MD     Intubation:     Induction:  Intravenous    Intubated:  Postinduction    Mask Ventilation:  N/a    Attempts:  1    Attempted By:  CRNA    Difficult Airway Encountered?: No      Airway Device:  Supraglottic airway/LMA    Airway Device Size:  3.0    Style/Cuff Inflation:  Cuffed    Secured at:  The lips    Placement Verified By:  Capnometry    Complicating Factors:  None    Findings Post-Intubation:  BS equal bilateral

## 2022-10-05 NOTE — ASSESSMENT & PLAN NOTE
Acute right hip pain following fall  Orthopedics consulted  Plan for OR today  PRN pain medication  PT/OT consult post operatively  Fall precautions  Right hip xray showing Nondisplaced transverse fracture of the right femoral neck

## 2022-10-05 NOTE — PLAN OF CARE
Dr carpio called and updated on poc and b/p does not want to treat b/p at this time no moans or grimace oriented to self ice to hip dsg r side Released from Pacu when criteria met pain controlled skin w+d No nausea No emesis dsg dry intact square dsg to hip  encouraged deep breaths Pt has all belongings  in her room WT BEAR IS TOE TOUCH ONLY

## 2022-10-05 NOTE — PLAN OF CARE
To room bedside report to desi Perry rn scd on and running and pulse ox ch k94 placed on 02 at 2 liters nc 96 will have pulse ox q shift

## 2022-10-06 LAB
ALBUMIN SERPL BCP-MCNC: 3 G/DL (ref 3.5–5.2)
ALP SERPL-CCNC: 73 U/L (ref 55–135)
ALT SERPL W/O P-5'-P-CCNC: 13 U/L (ref 10–44)
ANION GAP SERPL CALC-SCNC: 7 MMOL/L (ref 8–16)
AST SERPL-CCNC: 16 U/L (ref 10–40)
BASOPHILS # BLD AUTO: 0.04 K/UL (ref 0–0.2)
BASOPHILS NFR BLD: 0.4 % (ref 0–1.9)
BILIRUB SERPL-MCNC: 0.7 MG/DL (ref 0.1–1)
BUN SERPL-MCNC: 7 MG/DL (ref 8–23)
CALCIUM SERPL-MCNC: 8.2 MG/DL (ref 8.7–10.5)
CHLORIDE SERPL-SCNC: 107 MMOL/L (ref 95–110)
CO2 SERPL-SCNC: 22 MMOL/L (ref 23–29)
CREAT SERPL-MCNC: 0.7 MG/DL (ref 0.5–1.4)
DIFFERENTIAL METHOD: ABNORMAL
EOSINOPHIL # BLD AUTO: 0.2 K/UL (ref 0–0.5)
EOSINOPHIL NFR BLD: 1.7 % (ref 0–8)
ERYTHROCYTE [DISTWIDTH] IN BLOOD BY AUTOMATED COUNT: 13.6 % (ref 11.5–14.5)
EST. GFR  (NO RACE VARIABLE): >60 ML/MIN/1.73 M^2
GLUCOSE SERPL-MCNC: 125 MG/DL (ref 70–110)
HCT VFR BLD AUTO: 31.3 % (ref 37–48.5)
HGB BLD-MCNC: 10.4 G/DL (ref 12–16)
IMM GRANULOCYTES # BLD AUTO: 0.05 K/UL (ref 0–0.04)
IMM GRANULOCYTES NFR BLD AUTO: 0.5 % (ref 0–0.5)
LYMPHOCYTES # BLD AUTO: 1.3 K/UL (ref 1–4.8)
LYMPHOCYTES NFR BLD: 11.7 % (ref 18–48)
MCH RBC QN AUTO: 30.6 PG (ref 27–31)
MCHC RBC AUTO-ENTMCNC: 33.2 G/DL (ref 32–36)
MCV RBC AUTO: 92 FL (ref 82–98)
MONOCYTES # BLD AUTO: 0.9 K/UL (ref 0.3–1)
MONOCYTES NFR BLD: 8.1 % (ref 4–15)
NEUTROPHILS # BLD AUTO: 8.6 K/UL (ref 1.8–7.7)
NEUTROPHILS NFR BLD: 77.6 % (ref 38–73)
NRBC BLD-RTO: 0 /100 WBC
PLATELET # BLD AUTO: 157 K/UL (ref 150–450)
PMV BLD AUTO: 11.9 FL (ref 9.2–12.9)
POCT GLUCOSE: 130 MG/DL (ref 70–110)
POCT GLUCOSE: 138 MG/DL (ref 70–110)
POCT GLUCOSE: 143 MG/DL (ref 70–110)
POCT GLUCOSE: 144 MG/DL (ref 70–110)
POTASSIUM SERPL-SCNC: 4 MMOL/L (ref 3.5–5.1)
PROT SERPL-MCNC: 5.6 G/DL (ref 6–8.4)
RBC # BLD AUTO: 3.4 M/UL (ref 4–5.4)
SODIUM SERPL-SCNC: 136 MMOL/L (ref 136–145)
WBC # BLD AUTO: 11.04 K/UL (ref 3.9–12.7)

## 2022-10-06 PROCEDURE — 25000003 PHARM REV CODE 250: Performed by: ORTHOPAEDIC SURGERY

## 2022-10-06 PROCEDURE — 36415 COLL VENOUS BLD VENIPUNCTURE: CPT | Performed by: ORTHOPAEDIC SURGERY

## 2022-10-06 PROCEDURE — 63600175 PHARM REV CODE 636 W HCPCS: Performed by: ORTHOPAEDIC SURGERY

## 2022-10-06 PROCEDURE — 99900035 HC TECH TIME PER 15 MIN (STAT)

## 2022-10-06 PROCEDURE — 97530 THERAPEUTIC ACTIVITIES: CPT

## 2022-10-06 PROCEDURE — 94761 N-INVAS EAR/PLS OXIMETRY MLT: CPT

## 2022-10-06 PROCEDURE — 94760 N-INVAS EAR/PLS OXIMETRY 1: CPT

## 2022-10-06 PROCEDURE — 80053 COMPREHEN METABOLIC PANEL: CPT | Performed by: ORTHOPAEDIC SURGERY

## 2022-10-06 PROCEDURE — 12000002 HC ACUTE/MED SURGE SEMI-PRIVATE ROOM

## 2022-10-06 PROCEDURE — 97162 PT EVAL MOD COMPLEX 30 MIN: CPT

## 2022-10-06 PROCEDURE — G0425 INPT/ED TELECONSULT30: HCPCS | Mod: 95,,, | Performed by: PSYCHIATRY & NEUROLOGY

## 2022-10-06 PROCEDURE — G0425 PR INPT TELEHEALTH CONSULT 30M: ICD-10-PCS | Mod: 95,,, | Performed by: PSYCHIATRY & NEUROLOGY

## 2022-10-06 PROCEDURE — 97110 THERAPEUTIC EXERCISES: CPT

## 2022-10-06 PROCEDURE — 97166 OT EVAL MOD COMPLEX 45 MIN: CPT

## 2022-10-06 PROCEDURE — 97535 SELF CARE MNGMENT TRAINING: CPT

## 2022-10-06 PROCEDURE — 85025 COMPLETE CBC W/AUTO DIFF WBC: CPT | Performed by: ORTHOPAEDIC SURGERY

## 2022-10-06 RX ORDER — DIPHENHYDRAMINE HYDROCHLORIDE 50 MG/ML
12.5 INJECTION INTRAMUSCULAR; INTRAVENOUS ONCE
Status: DISCONTINUED | OUTPATIENT
Start: 2022-10-06 | End: 2022-10-06

## 2022-10-06 RX ADMIN — METOPROLOL SUCCINATE 50 MG: 50 TABLET, EXTENDED RELEASE ORAL at 09:10

## 2022-10-06 RX ADMIN — LOSARTAN POTASSIUM 50 MG: 25 TABLET, FILM COATED ORAL at 09:10

## 2022-10-06 RX ADMIN — HYDROCODONE BITARTRATE AND ACETAMINOPHEN 1 TABLET: 5; 325 TABLET ORAL at 11:10

## 2022-10-06 RX ADMIN — DONEPEZIL HYDROCHLORIDE 10 MG: 5 TABLET, FILM COATED ORAL at 09:10

## 2022-10-06 RX ADMIN — SODIUM CHLORIDE: 0.9 INJECTION, SOLUTION INTRAVENOUS at 11:10

## 2022-10-06 RX ADMIN — MEMANTINE 10 MG: 5 TABLET ORAL at 09:10

## 2022-10-06 RX ADMIN — CEFAZOLIN SODIUM 2 G: 2 SOLUTION INTRAVENOUS at 12:10

## 2022-10-06 RX ADMIN — HYDROCODONE BITARTRATE AND ACETAMINOPHEN 1 TABLET: 5; 325 TABLET ORAL at 12:10

## 2022-10-06 RX ADMIN — CEFAZOLIN SODIUM 2 G: 2 SOLUTION INTRAVENOUS at 09:10

## 2022-10-06 RX ADMIN — MUPIROCIN: 20 OINTMENT TOPICAL at 09:10

## 2022-10-06 RX ADMIN — SODIUM CHLORIDE: 0.9 INJECTION, SOLUTION INTRAVENOUS at 12:10

## 2022-10-06 RX ADMIN — AMLODIPINE BESYLATE 5 MG: 5 TABLET ORAL at 09:10

## 2022-10-06 RX ADMIN — MUPIROCIN: 20 OINTMENT TOPICAL at 08:10

## 2022-10-06 RX ADMIN — ACETAMINOPHEN 650 MG: 325 TABLET ORAL at 11:10

## 2022-10-06 RX ADMIN — ENOXAPARIN SODIUM 40 MG: 100 INJECTION SUBCUTANEOUS at 09:10

## 2022-10-06 RX ADMIN — ATORVASTATIN CALCIUM 20 MG: 20 TABLET, FILM COATED ORAL at 09:10

## 2022-10-06 RX ADMIN — HYDROCODONE BITARTRATE AND ACETAMINOPHEN 1 TABLET: 5; 325 TABLET ORAL at 09:10

## 2022-10-06 RX ADMIN — OXYCODONE HYDROCHLORIDE 10 MG: 10 TABLET ORAL at 05:10

## 2022-10-06 RX ADMIN — PANTOPRAZOLE SODIUM 40 MG: 40 TABLET, DELAYED RELEASE ORAL at 09:10

## 2022-10-06 NOTE — CARE UPDATE
10/05/22 1945   Patient Assessment/Suction   Level of Consciousness (AVPU) alert   Respiratory Effort Normal;Unlabored   Expansion/Accessory Muscles/Retractions no use of accessory muscles   PRE-TX-O2   O2 Device (Oxygen Therapy) nasal cannula   $ Is the patient on Low Flow Oxygen? Yes   Flow (L/min) 2   SpO2 95 %   Pulse Oximetry Type Intermittent   $ Pulse Oximetry - Single Charge Pulse Oximetry - Single   Pulse 90   Resp 16   Incentive Spirometer   Administration (IS) unable to perform  (pt to sleepy)

## 2022-10-06 NOTE — PLAN OF CARE
POC discussed with patient and family, verbalized understanding. Patient with uneventful night after surgery. Dressing R hip CDI. NV wnl. Adequate urine output noted in read. Medicated X 1 with oral  pain meidcation. Call light at bedside. Call light at bedsied.

## 2022-10-06 NOTE — SUBJECTIVE & OBJECTIVE
Interval History:  Patient seen and examined.  Patient's daughter is at bedside.  RN reported the patient with increased agitation so presented to bedside. Will consult tele psych. Upon my evaluation patient is more calm and follows basic commands.  Per daughter patient's mental status waxes and wanes at baseline.      Review of Systems   Unable to perform ROS: Dementia   Objective:     Vital Signs (Most Recent):  Temp: 98.8 °F (37.1 °C) (10/06/22 0915)  Pulse: (!) 125 (10/06/22 0915)  Resp: 16 (10/06/22 0920)  BP: (!) 153/70 (10/06/22 0915)  SpO2: 96 % (10/06/22 0915)   Vital Signs (24h Range):  Temp:  [97.1 °F (36.2 °C)-98.8 °F (37.1 °C)] 98.8 °F (37.1 °C)  Pulse:  [] 125  Resp:  [12-22] 16  SpO2:  [91 %-100 %] 96 %  BP: (136-198)/(65-92) 153/70     Weight: 60.4 kg (133 lb 3.2 oz)  Body mass index is 22.86 kg/m².    Intake/Output Summary (Last 24 hours) at 10/6/2022 1122  Last data filed at 10/6/2022 0631  Gross per 24 hour   Intake 2509.61 ml   Output 325 ml   Net 2184.61 ml      Physical Exam  Vitals and nursing note reviewed.   Constitutional:       General: She is not in acute distress.     Appearance: Normal appearance. She is ill-appearing (Chronically).   HENT:      Head: Normocephalic and atraumatic.      Mouth/Throat:      Mouth: Mucous membranes are moist.      Pharynx: Oropharynx is clear.   Eyes:      Conjunctiva/sclera: Conjunctivae normal.      Pupils: Pupils are equal, round, and reactive to light.   Cardiovascular:      Rate and Rhythm: Normal rate and regular rhythm.      Pulses: Normal pulses.      Heart sounds: Normal heart sounds.   Pulmonary:      Effort: Pulmonary effort is normal.      Breath sounds: Normal breath sounds.   Abdominal:      General: Abdomen is flat. Bowel sounds are normal. There is no distension.      Palpations: Abdomen is soft.      Tenderness: There is no abdominal tenderness. There is no guarding or rebound.   Musculoskeletal:      Comments: Expected tenderness to  right hip.   Skin:     General: Skin is warm.      Capillary Refill: Capillary refill takes 2 to 3 seconds.   Neurological:      Mental Status: She is alert. She is disoriented.      Comments: Patient's daughter is at bedside and reports patient's mental status waxes and wanes.  Notified by nurse that patient was very anxious and agitated.  Upon my evaluation patient is much more calm and oriented only to self.  She can follow basic commands.     Psychiatric:         Mood and Affect: Mood normal.         Behavior: Behavior normal.       Significant Labs: All pertinent labs within the past 24 hours have been reviewed.  CBC:   Recent Labs   Lab 10/05/22  0624 10/06/22  0327   WBC 8.68 11.04   HGB 10.8* 10.4*   HCT 32.1* 31.3*    157     CMP:   Recent Labs   Lab 10/05/22  0624 10/06/22  0327    136   K 4.0 4.0    107   CO2 21* 22*   * 125*   BUN 11 7*   CREATININE 0.7 0.7   CALCIUM 8.8 8.2*   PROT  --  5.6*   ALBUMIN  --  3.0*   BILITOT  --  0.7   ALKPHOS  --  73   AST  --  16   ALT  --  13   ANIONGAP 7* 7*       Significant Imaging: I have reviewed all pertinent imaging results/findings within the past 24 hours.

## 2022-10-06 NOTE — PT/OT/SLP EVAL
Occupational Therapy   Evaluation    Name: Soni Harris  MRN: [id number]  Admitting Diagnosis:  Closed fracture of right hip  Recent Surgery: Procedure(s) (LRB):  PINNING, HIP, PERCUTANEOUS (SYNTHES 6.5 Cannulated screws) -- fracture table -- C arm from opposite side (Right) 1 Day Post-Op    Recommendations:     Discharge Recommendations: nursing facility, skilled (versus HH)  Discharge Equipment Recommendations:  bath bench, wheelchair  Barriers to discharge:  None    Assessment:     Soni Harris is a 88 y.o. female with a medical diagnosis of Closed fracture of right hip. Past medical history dementia without behavioral disturbances, HTN, CVA, and CVD.     Upon OTR entry, patient up in chair with multiple family members present. Patient oriented to person and somewhat oriented to situation. Patient reporting pain of R hip - nurseShanika, notified and providing Tylenol. Patient easily agitated and attempting to pull out L peripheral IV multiple times. Patient responds to soothing/encouragement at times. Patient requesting to have BM - Max (A) x 2 people and Min (A) of 3rd person to safely transfer patient in adherence with R LE TTWB precautions; Max (A) x 2 to perform toileting tasks - patient voiding only. Transferring back to chair with Max (A) x 2 in adherence with R LE TTWB precautions with OTR positioning R LE in extension. She presents with the following performance deficits affecting function: weakness, impaired endurance, impaired self care skills, impaired functional mobility, gait instability, impaired balance, impaired cognition, decreased upper extremity function, decreased lower extremity function, decreased safety awareness, pain, decreased ROM, orthopedic precautions.      Patient seemingly confused and becoming easily agitated but with very good family support. Recommend SNF versus home with 24/7 Supervision/assistance from family and HH.    Rehab Prognosis: Good to Fair; patient would  benefit from acute skilled OT services to address these deficits and reach maximum level of function.       Plan:     Patient to be seen 4 x/week (Except day of evaluation - BID) to address the above listed problems via self-care/home management, therapeutic activities, therapeutic exercises  Plan of Care Expires: 10/27/22  Plan of Care Reviewed with: patient, other (see comments) (Adult children)    Subjective     Chief Complaint: R hip pain  Patient/Family Comments/goals: To have a BM    Occupational Profile:  Living Environment: Lives with adult children in single story home with threshold step to enter and tub/shower   Previous level of function: Patient owns but does not use the items listed below; patient does use shower chair and BSC; patient requires assistance to perform shower transfer and assistance to bathe; patient requires Supervision to perform toileting tasks and occasionally requires assistance to dress herself; family reports no falls within the last 6 months with this exception  Roles and Routines: Mother, grandmother  Equipment Used at Home:  bedside commode, cane, straight, rollator, walker, rolling, shower chair, other (see comments) (Owns but does not use cane, rollator or RW)  Assistance upon Discharge: Facility staff versus family    Pain/Comfort:  Pain Rating 1: other (see comments) (not rated)  Location - Side 1: Right  Location - Orientation 1: generalized  Location 1: hip  Pain Addressed 1: Reposition, Distraction, Cessation of Activity, Nurse notified, Other (see comments) (Halle Osorio, providing Tylenol)  Pain Rating Post-Intervention 1: other (see comments) (not rated)    Patients cultural, spiritual, Taoism conflicts given the current situation: no    Objective:     Communicated with: Halle Osorio prior to session.  Patient found up in chair with chair check, peripheral IV (2 daughters and son in room) upon OT entry to room.    General Precautions: Standard, fall    Orthopedic Precautions:RLE toe touch weight bearing   Braces: N/A  Respiratory Status: Room air    Occupational Performance:    Functional Mobility/Transfers:  Chair<>BSC Stand/Squat Pivot with Max (A) x 2 and Min (A) x 1 using RW; OTR positioning patient's R LE to prevent weight bearing beyond TTWB; patient becoming agitated during transfer and began trying to pull IV out once seated on commode; nurse, Miracle present to provide Tylenol; 2 daughters present and encouraging patient throughout     Activities of Daily Living:  Toileting: total assistance - Max (A) x 2 to safely manage clothing and hygiene while in adherence with TTWB precautions from BSC - patient voiding only; no BM    Cognitive/Visual Perceptual:  Cognitive/Psychosocial Skills:     -       Oriented to: Person and aware she had surgery R LE   -       Follows Commands/attention: Inattentive; easily distracted; requires constant redirection   -       Communication: clear/fluent  -       Safety awareness/insight to disability: impaired   -       Mood/Affect/Coping skills/emotional control: Anxious, Agitated, and angry at times - attempting to pull out L UE peripheral IV and swatting at family members and OTR; tearful at times seemingly secondary to pain    Physical Exam:  Upper Extremity Strength:    -       Right Upper Extremity: WFL  -       Left Upper Extremity: WFL    AMPAC 6 Click ADL:  AMPAC Total Score: 12    Treatment & Education:  - OTR providing education/instruction regarding OT role/POC, reinforcing R LE TTWB precautions, correct/safe transfer sequence/techniques in adherence with TTBW - patient with little carryover and becoming agitated throughout chair<>BSC transfers with assistance x 3 people for safety.  - OTR providing education/instruction regarding DME recommendations: RW, TTB and use of BSC at bedside at night and over toilet during the day - OTR instructing on correct/safe set-up of items but reinforcing that some point patient  should have HH therapy to reinforce - family verbalizes/demonstrates understanding and in agreement when appropriate  - OTR initiating discharge planning - recommend SNF versus HH    Patient left up in chair with all lines intact, call button in reach, chair alarm on, Nurse, Miracle notified, and daughters present    GOALS:   Multidisciplinary Problems       Occupational Therapy Goals          Problem: Occupational Therapy    Goal Priority Disciplines Outcome Interventions   Occupational Therapy Goal     OT, PT/OT     Description: Goals to be met by: 10/27/2022     Patient will increase functional independence with ADLs by performing:    UE Dressing with Supervision.  LE Dressing with Minimal Assistance with AD/AE as needed and in adherence with R LE TTWB.  Grooming while seated with Set-up Assistance.  Toileting from toilet with Minimal Assistance for hygiene and clothing management with AD as needed and in adherence with R LE TTWB.   Toilet transfer to toilet with Minimal Assistance with AD and in adherence with R LE TTWB.                         History:     Past Medical History:   Diagnosis Date    Arthritis     GERD (gastroesophageal reflux disease)     Glaucoma (increased eye pressure)     Hypertension     Stroke          Past Surgical History:   Procedure Laterality Date    EYE SURGERY      HYSTERECTOMY         Time Tracking:     OT Date of Treatment: 10/06/22  OT Start Time: 1114  OT Stop Time: 1200  OT Total Time (min): 46 min    Billable Minutes:Evaluation 15  Self Care/Home Management 11  Therapeutic Activity 20    10/6/2022

## 2022-10-06 NOTE — ASSESSMENT & PLAN NOTE
Acute right hip pain following fall  Orthopedics consulted  POD 1 percutaneous pinning, right femoral neck fracture  with Dr. Jhaveri  PRN pain medication  PT/OT consult post operatively  Fall precautions  Right hip xray showing Nondisplaced transverse fracture of the right femoral neck

## 2022-10-06 NOTE — PROGRESS NOTES
[facility] Hospital Medicine  Progress Note    Patient Name: Soni Harris  MRN: [id number]  Patient Class: IP- Inpatient   Admission Date: 10/5/2022  Length of Stay: 1 days  Attending Physician: Piotr Villalpando MD  Primary Care Provider: Fran Gilbert MD        Subjective:     Principal Problem:Closed fracture of right hip        HPI:  Soni Harris is a 88 year old black female with a PMHx significant for dementia without behavioral disturbances, HTN, CVA, and CVD presenting for evaluation of right hip pain following a fall at home last night. Patient unable to provide appropriate history due to baseline dementia. History obtained for daughter at bedside. Daughter reports patient lives at home with her. She states patient fell around 9 o clock last night while attempting to get out of bed. Daughter states she heard patient calling for help immediately after the fall. Daughter states patient has habit of getting out of bed multiple times throughout night. Patient reports her right hip pain as a 5/10 without radiation. ED workup significant for right hip xray showing nondisplaced transverse fracture of the right femoral neck. ED provider spoke to orthopedic surgeon on call who recommended NPO for surgery later today. Plan to start on prn pain medications with PT/OT consult postoperatively. Patient will be admitted to inpatient medical services for continued monitoring and treatment.       Overview/Hospital Course:  Soni Harrisis a 88 year old black female with a PMHx significant for dementia, HTN, CVA, and CVD presenting for right hip pain following a fall. Patient was monitored closely throughout course of hospital stay by hospital medicine team. Orthopedic consulted for nondisplaced transverse fracture of right femoral neck. Patient held NPO for surgery with Dr. Jhaveri on 10/5. Patient started on prn pain medication. PT/OT consulted for post op evaluation.       Interval History:   Patient seen and examined.  Patient's daughter is at bedside.  RN reported the patient with increased agitation so presented to bedside. Will consult tele psych. Upon my evaluation patient is more calm and follows basic commands.  Per daughter patient's mental status waxes and wanes at baseline.      Review of Systems   Unable to perform ROS: Dementia   Objective:     Vital Signs (Most Recent):  Temp: 98.8 °F (37.1 °C) (10/06/22 0915)  Pulse: (!) 125 (10/06/22 0915)  Resp: 16 (10/06/22 0920)  BP: (!) 153/70 (10/06/22 0915)  SpO2: 96 % (10/06/22 0915)   Vital Signs (24h Range):  Temp:  [97.1 °F (36.2 °C)-98.8 °F (37.1 °C)] 98.8 °F (37.1 °C)  Pulse:  [] 125  Resp:  [12-22] 16  SpO2:  [91 %-100 %] 96 %  BP: (136-198)/(65-92) 153/70     Weight: 60.4 kg (133 lb 3.2 oz)  Body mass index is 22.86 kg/m².    Intake/Output Summary (Last 24 hours) at 10/6/2022 1122  Last data filed at 10/6/2022 0631  Gross per 24 hour   Intake 2509.61 ml   Output 325 ml   Net 2184.61 ml      Physical Exam  Vitals and nursing note reviewed.   Constitutional:       General: She is not in acute distress.     Appearance: Normal appearance. She is ill-appearing (Chronically).   HENT:      Head: Normocephalic and atraumatic.      Mouth/Throat:      Mouth: Mucous membranes are moist.      Pharynx: Oropharynx is clear.   Eyes:      Conjunctiva/sclera: Conjunctivae normal.      Pupils: Pupils are equal, round, and reactive to light.   Cardiovascular:      Rate and Rhythm: Normal rate and regular rhythm.      Pulses: Normal pulses.      Heart sounds: Normal heart sounds.   Pulmonary:      Effort: Pulmonary effort is normal.      Breath sounds: Normal breath sounds.   Abdominal:      General: Abdomen is flat. Bowel sounds are normal. There is no distension.      Palpations: Abdomen is soft.      Tenderness: There is no abdominal tenderness. There is no guarding or rebound.   Musculoskeletal:      Comments: Expected tenderness to right hip.    Skin:     General: Skin is warm.      Capillary Refill: Capillary refill takes 2 to 3 seconds.   Neurological:      Mental Status: She is alert. She is disoriented.      Comments: Patient's daughter is at bedside and reports patient's mental status waxes and wanes.  Notified by nurse that patient was very anxious and agitated.  Upon my evaluation patient is much more calm and oriented only to self.  She can follow basic commands.     Psychiatric:         Mood and Affect: Mood normal.         Behavior: Behavior normal.       Significant Labs: All pertinent labs within the past 24 hours have been reviewed.  CBC:   Recent Labs   Lab 10/05/22  0624 10/06/22  0327   WBC 8.68 11.04   HGB 10.8* 10.4*   HCT 32.1* 31.3*    157     CMP:   Recent Labs   Lab 10/05/22  0624 10/06/22  0327    136   K 4.0 4.0    107   CO2 21* 22*   * 125*   BUN 11 7*   CREATININE 0.7 0.7   CALCIUM 8.8 8.2*   PROT  --  5.6*   ALBUMIN  --  3.0*   BILITOT  --  0.7   ALKPHOS  --  73   AST  --  16   ALT  --  13   ANIONGAP 7* 7*       Significant Imaging: I have reviewed all pertinent imaging results/findings within the past 24 hours.      Assessment/Plan:      * Closed fracture of right hip  Acute right hip pain following fall  Orthopedics consulted  POD 1 percutaneous pinning, right femoral neck fracture  with Dr. Jhaveri  PRN pain medication  PT/OT consult post operatively  Fall precautions  Right hip xray showing Nondisplaced transverse fracture of the right femoral neck    Late onset Alzheimer's dementia with behavioral disturbance  Dementia at baseline  Chronic  Resume home medications  Delirium precautions  Follows neurology outpatient  Consult tele psych: Appreciate recommendations      CVD (cardiovascular disease)  Chronic  Resume home medications        GERD (gastroesophageal reflux disease)  Chronic  Resume home PPI      Hypertension  Chronic, controlled.  Latest blood pressure and vitals reviewed-   Temp:  [97.1  °F (36.2 °C)-98.8 °F (37.1 °C)]   Pulse:  []   Resp:  [12-22]   BP: (136-198)/(65-92)   SpO2:  [91 %-100 %] .   Home meds for hypertension were reviewed and noted below.   Hypertension Medications             amlodipine (NORVASC) 2.5 MG tablet Take 2.5 mg by mouth once daily.    amLODIPine (NORVASC) 5 MG tablet Take 5 mg by mouth once daily.    losartan (COZAAR) 50 MG tablet Take 50 mg by mouth once daily.    metoprolol succinate (TOPROL-XL) 50 MG 24 hr tablet Take 50 mg by mouth once daily.    metoprolol tartrate (LOPRESSOR) 25 MG tablet Take 22 mg by mouth once daily.          While in the hospital, will manage blood pressure as follows; Continue home antihypertensive regimen    Will utilize p.r.n. blood pressure medication only if patient's blood pressure greater than  180/110 and she develops symptoms such as worsening chest pain or shortness of breath.        CVA (cerebral infarction)  History of CVA  Chronic, resume home ASA      VTE Risk Mitigation (From admission, onward)         Ordered     enoxaparin injection 40 mg  Daily         10/05/22 1646     IP VTE HIGH RISK PATIENT  Once         10/05/22 0855     Place sequential compression device  Until discontinued         10/05/22 0855     Reason for No Pharmacological VTE Prophylaxis  Once        Question:  Reasons:  Answer:  Risk of Bleeding    10/05/22 0855                Discharge Planning   TRIXIE:      Code Status: Full Code   Is the patient medically ready for discharge?:     Reason for patient still in hospital (select all that apply): Treatment, Consult recommendations and PT / OT recommendations  Discharge Plan A: Skilled Nursing Facility                  Gilma Cheung PA-C  Department of Hospital Medicine   [facility]

## 2022-10-06 NOTE — PLAN OF CARE
Problem: Infection  Goal: Absence of Infection Signs and Symptoms  Outcome: Ongoing, Progressing     Problem: Adult Inpatient Plan of Care  Goal: Plan of Care Review  Outcome: Ongoing, Progressing  Goal: Patient-Specific Goal (Individualized)  Outcome: Ongoing, Progressing  Goal: Absence of Hospital-Acquired Illness or Injury  Outcome: Ongoing, Progressing  Goal: Optimal Comfort and Wellbeing  Outcome: Ongoing, Progressing  Goal: Readiness for Transition of Care  Outcome: Ongoing, Progressing     Problem: Fall Injury Risk  Goal: Absence of Fall and Fall-Related Injury  Outcome: Ongoing, Progressing     Problem: Skin Injury Risk Increased  Goal: Skin Health and Integrity  Outcome: Ongoing, Progressing

## 2022-10-06 NOTE — PLAN OF CARE
Goals to be met by: 10/27/2022     Patient will increase functional independence with ADLs by performing:    UE Dressing with Supervision.  LE Dressing with Minimal Assistance with AD/AE as needed and in adherence with R LE TTWB.  Grooming while seated with Set-up Assistance.  Toileting from toilet with Minimal Assistance for hygiene and clothing management with AD as needed and in adherence with R LE TTWB.   Toilet transfer to toilet with Minimal Assistance with AD and in adherence with R LE TTWB.    OT POC initiated and established.

## 2022-10-06 NOTE — PLAN OF CARE
"  Problem: Physical Therapy  Goal: Physical Therapy Goal  Description: Goals to be met by: 10-     Patient will increase functional independence with mobility by performin. Supine to sit with Maximum Assistance  2. Sit to stand transfer with Maximum Assistance  3. Bed to chair transfer with Maximum Assistance using Rolling Walker  4. Sitting at edge of bed x20 minutes with Maximum Assistance  5. Lower extremity exercise program x20 reps     RLE TTWB only      Outcome: Ongoing, Progressing   PT eval and treat initiated. Pt with dementia " I want to go home" 2 daughters at bedside. Pt tearful but agreeable to PT. Gentle thera ex to LE's. Max assist to sit EOB. Wanting to use bathroom thus commode transfer max assist x2 with TTWB RLE. Pt transferred to reclining chair max assist x2. Pt will benefit from continued therapies SNF vs HHPT with 24 hr supervision. Excellent family support  "

## 2022-10-06 NOTE — PT/OT/SLP EVAL
Physical Therapy Evaluation    Patient Name:  Soni Harris   MRN:  [id number]    Recommendations:     Discharge Recommendations:  home health PT, nursing facility, skilled   Discharge Equipment Recommendations: wheelchair   Barriers to discharge: None    Assessment:     Soni Harris is a 88 y.o. female admitted with a medical diagnosis of Closed fracture of right hip.  She presents with the following impairments/functional limitations:  weakness, impaired endurance, impaired functional mobility, impaired balance, impaired cognition, decreased lower extremity function, decreased safety awareness, pain, decreased ROM, impaired cardiopulmonary response to activity, orthopedic precautions .    Pt seen supine in bed. 2 daughters at bedside who stated pt just fell asleep- was restless earlier when read was pulled and for meds. Per daughter pt was previously ambulatory with cane and home with family. Pt with dementia.   Pt awakened and agreeable to sit EOB. Pt seen for gentle thera ex in supine. Max assist x2 to sit EOB with extra time. Pt then said- wanting to use bathroom thus a bedside commode was obtained with successful voiding. Commode to chair transfers assist x2 and reclined. Pt tearful with lots of encouragement offered. Pt calm in chair at end of session.    Pt to benefit from continued therapies HHPT with 24 hr supervision in a familiar environment  vs SNF    Rehab Prognosis: Fair; patient would benefit from acute skilled PT services to address these deficits and reach maximum level of function.    Recent Surgery: Procedure(s) (LRB):  PINNING, HIP, PERCUTANEOUS (SYNTHES 6.5 Cannulated screws) -- fracture table -- C arm from opposite side (Right) 1 Day Post-Op    Plan:     During this hospitalization, patient to be seen daily (1-2x week) to address the identified rehab impairments via therapeutic activities, therapeutic exercises, wheelchair management/training and progress toward the following  "goals:    Plan of Care Expires:  10/30/22    Subjective   Daughters stated pt walks a lot at home and has big family 5 boys and 2 girls  Pt asleep- easily awakened- tearful " who are you"  Pt requiring lots of encouragement with extra time  Chief Complaint: pain RH  Patient/Family Comments/goals: none stated  Pain/Comfort:  Pain Rating 1:  (not rated)  Location - Side 1: Right  Location 1: hip    Patients cultural, spiritual, [employer]ar[employer] conflicts given the current situation:      Living Environment:  Home with family- no steps  Prior to admission, patients level of function was ambulatory around home.  Equipment used at home: cane, straight.  DME owned (not currently used): rolling walker.  Upon discharge, patient will have assistance from family.    Objective:     Communicated with nurse Neri prior to session.  Patient found HOB elevated with peripheral IV, bed alarm  upon PT entry to room.    General Precautions: Standard, fall   Orthopedic Precautions:RLE toe touch weight bearing   Braces: N/A  Respiratory Status: Room air    Exams:  Postural Exam:  Patient presented with the following abnormalities:    -       Rounded shoulders  -       Forward head  -       BMI 22  RLE ROM: Deficits: limited by pain  RLE Strength: Deficits: 3-/5  LLE ROM: WFL  LLE Strength: Deficits: 3/5    Functional Mobility:  Bed Mobility:     Scooting: maximal assistance  Supine to Sit: maximal assistance  Transfers:     Sit to Stand:  maximal assistance with no AD  Bed to Chair: maximal assistance and of 2 persons with  no AD  using  Squat Pivot  Toilet Transfer: maximal assistance with  no AD  using  Squat Pivot to bedside commode    Therapeutic Activities and Exercises:   Patient was educated on the importance of OOB activity and functional mobility to negate negative effects of prolonged bed rest during hospitalization, safe transfers and ambulation, and D/C planning   Thera ex in supine- gentle AAROME  EOB sitting and OOB to chair " max assist x2 with R TTWB- assist managing RLE for safety    AM-PAC 6 CLICK MOBILITY  Total Score:10     Patient left up in chair with all lines intact, call button in reach, chair alarm on, nurse Miracle notified, and 2 daughters present.    GOALS:   Multidisciplinary Problems       Physical Therapy Goals          Problem: Physical Therapy    Goal Priority Disciplines Outcome Goal Variances Interventions   Physical Therapy Goal     PT, PT/OT Ongoing, Progressing     Description: Goals to be met by: 10-     Patient will increase functional independence with mobility by performin. Supine to sit with Maximum Assistance  2. Sit to stand transfer with Maximum Assistance  3. Bed to chair transfer with Maximum Assistance using Rolling Walker  4. Sitting at edge of bed x20 minutes with Maximum Assistance  5. Lower extremity exercise program x20 reps     RLE TTWB only                           History:     Past Medical History:   Diagnosis Date    Arthritis     GERD (gastroesophageal reflux disease)     Glaucoma (increased eye pressure)     Hypertension     Stroke        Past Surgical History:   Procedure Laterality Date    EYE SURGERY      HYSTERECTOMY         Time Tracking:     PT Received On: 10/06/22  PT Start Time: 1018     PT Stop Time: 1107  PT Total Time (min): 49 min     Billable Minutes: Evaluation 10, Therapeutic Activity 29, and Therapeutic Exercise 10      10/06/2022

## 2022-10-06 NOTE — ASSESSMENT & PLAN NOTE
Dementia at baseline  Chronic  Resume home medications  Delirium precautions  Follows neurology outpatient  Consult tele psych: Appreciate recommendations

## 2022-10-06 NOTE — ASSESSMENT & PLAN NOTE
Chronic, controlled.  Latest blood pressure and vitals reviewed-   Temp:  [97.1 °F (36.2 °C)-98.8 °F (37.1 °C)]   Pulse:  []   Resp:  [12-22]   BP: (136-198)/(65-92)   SpO2:  [91 %-100 %] .   Home meds for hypertension were reviewed and noted below.   Hypertension Medications             amlodipine (NORVASC) 2.5 MG tablet Take 2.5 mg by mouth once daily.    amLODIPine (NORVASC) 5 MG tablet Take 5 mg by mouth once daily.    losartan (COZAAR) 50 MG tablet Take 50 mg by mouth once daily.    metoprolol succinate (TOPROL-XL) 50 MG 24 hr tablet Take 50 mg by mouth once daily.    metoprolol tartrate (LOPRESSOR) 25 MG tablet Take 22 mg by mouth once daily.          While in the hospital, will manage blood pressure as follows; Continue home antihypertensive regimen    Will utilize p.r.n. blood pressure medication only if patient's blood pressure greater than  180/110 and she develops symptoms such as worsening chest pain or shortness of breath.

## 2022-10-06 NOTE — PLAN OF CARE
"SW acknowledged therapy's discharge recommendation, HH w/ PT or SNF.    1:38pm - SW spoke to pt's daughter, Florencia, informed and explained PT"s dc rec.  Daughter would like time to discuss with family and would like SW to call her back.     10/06/22 1344   Post-Acute Status   Post-Acute Authorization Placement   Discharge Plan   Discharge Plan A Skilled Nursing Facility     "

## 2022-10-07 LAB
ALBUMIN SERPL BCP-MCNC: 2.9 G/DL (ref 3.5–5.2)
ALP SERPL-CCNC: 66 U/L (ref 55–135)
ALT SERPL W/O P-5'-P-CCNC: 11 U/L (ref 10–44)
ANION GAP SERPL CALC-SCNC: 9 MMOL/L (ref 8–16)
AST SERPL-CCNC: 20 U/L (ref 10–40)
BASOPHILS # BLD AUTO: 0.07 K/UL (ref 0–0.2)
BASOPHILS NFR BLD: 0.7 % (ref 0–1.9)
BILIRUB SERPL-MCNC: 0.7 MG/DL (ref 0.1–1)
BUN SERPL-MCNC: 7 MG/DL (ref 8–23)
CALCIUM SERPL-MCNC: 8.5 MG/DL (ref 8.7–10.5)
CHLORIDE SERPL-SCNC: 109 MMOL/L (ref 95–110)
CO2 SERPL-SCNC: 20 MMOL/L (ref 23–29)
CREAT SERPL-MCNC: 0.7 MG/DL (ref 0.5–1.4)
DIFFERENTIAL METHOD: ABNORMAL
EOSINOPHIL # BLD AUTO: 0.3 K/UL (ref 0–0.5)
EOSINOPHIL NFR BLD: 2.5 % (ref 0–8)
ERYTHROCYTE [DISTWIDTH] IN BLOOD BY AUTOMATED COUNT: 13.6 % (ref 11.5–14.5)
EST. GFR  (NO RACE VARIABLE): >60 ML/MIN/1.73 M^2
GLUCOSE SERPL-MCNC: 110 MG/DL (ref 70–110)
HCT VFR BLD AUTO: 31.3 % (ref 37–48.5)
HGB BLD-MCNC: 10.7 G/DL (ref 12–16)
IMM GRANULOCYTES # BLD AUTO: 0.03 K/UL (ref 0–0.04)
IMM GRANULOCYTES NFR BLD AUTO: 0.3 % (ref 0–0.5)
LYMPHOCYTES # BLD AUTO: 1.9 K/UL (ref 1–4.8)
LYMPHOCYTES NFR BLD: 18.1 % (ref 18–48)
MCH RBC QN AUTO: 31 PG (ref 27–31)
MCHC RBC AUTO-ENTMCNC: 34.2 G/DL (ref 32–36)
MCV RBC AUTO: 91 FL (ref 82–98)
MONOCYTES # BLD AUTO: 1.4 K/UL (ref 0.3–1)
MONOCYTES NFR BLD: 13.7 % (ref 4–15)
NEUTROPHILS # BLD AUTO: 6.6 K/UL (ref 1.8–7.7)
NEUTROPHILS NFR BLD: 64.7 % (ref 38–73)
NRBC BLD-RTO: 0 /100 WBC
PLATELET # BLD AUTO: 135 K/UL (ref 150–450)
PMV BLD AUTO: 11.8 FL (ref 9.2–12.9)
POCT GLUCOSE: 106 MG/DL (ref 70–110)
POTASSIUM SERPL-SCNC: 4.2 MMOL/L (ref 3.5–5.1)
PROT SERPL-MCNC: 5.9 G/DL (ref 6–8.4)
RBC # BLD AUTO: 3.45 M/UL (ref 4–5.4)
SODIUM SERPL-SCNC: 138 MMOL/L (ref 136–145)
WBC # BLD AUTO: 10.27 K/UL (ref 3.9–12.7)

## 2022-10-07 PROCEDURE — 85025 COMPLETE CBC W/AUTO DIFF WBC: CPT | Performed by: ORTHOPAEDIC SURGERY

## 2022-10-07 PROCEDURE — 97530 THERAPEUTIC ACTIVITIES: CPT

## 2022-10-07 PROCEDURE — 99900035 HC TECH TIME PER 15 MIN (STAT)

## 2022-10-07 PROCEDURE — 25000003 PHARM REV CODE 250

## 2022-10-07 PROCEDURE — 94799 UNLISTED PULMONARY SVC/PX: CPT

## 2022-10-07 PROCEDURE — 97110 THERAPEUTIC EXERCISES: CPT

## 2022-10-07 PROCEDURE — 25000003 PHARM REV CODE 250: Performed by: ORTHOPAEDIC SURGERY

## 2022-10-07 PROCEDURE — 36415 COLL VENOUS BLD VENIPUNCTURE: CPT | Performed by: ORTHOPAEDIC SURGERY

## 2022-10-07 PROCEDURE — 80053 COMPREHEN METABOLIC PANEL: CPT | Performed by: ORTHOPAEDIC SURGERY

## 2022-10-07 PROCEDURE — 94761 N-INVAS EAR/PLS OXIMETRY MLT: CPT

## 2022-10-07 PROCEDURE — 12000002 HC ACUTE/MED SURGE SEMI-PRIVATE ROOM

## 2022-10-07 RX ORDER — NAPROXEN SODIUM 220 MG/1
81 TABLET, FILM COATED ORAL 2 TIMES DAILY
Status: DISCONTINUED | OUTPATIENT
Start: 2022-10-07 | End: 2022-10-08 | Stop reason: HOSPADM

## 2022-10-07 RX ADMIN — ASPIRIN 81 MG CHEWABLE TABLET 81 MG: 81 TABLET CHEWABLE at 08:10

## 2022-10-07 RX ADMIN — METOPROLOL SUCCINATE 50 MG: 50 TABLET, EXTENDED RELEASE ORAL at 10:10

## 2022-10-07 RX ADMIN — DONEPEZIL HYDROCHLORIDE 10 MG: 5 TABLET, FILM COATED ORAL at 10:10

## 2022-10-07 RX ADMIN — OXYCODONE HYDROCHLORIDE 10 MG: 10 TABLET ORAL at 06:10

## 2022-10-07 RX ADMIN — LOSARTAN POTASSIUM 50 MG: 25 TABLET, FILM COATED ORAL at 10:10

## 2022-10-07 RX ADMIN — AMLODIPINE BESYLATE 5 MG: 5 TABLET ORAL at 10:10

## 2022-10-07 RX ADMIN — OXYCODONE HYDROCHLORIDE 10 MG: 10 TABLET ORAL at 11:10

## 2022-10-07 RX ADMIN — ATORVASTATIN CALCIUM 20 MG: 20 TABLET, FILM COATED ORAL at 10:10

## 2022-10-07 RX ADMIN — PANTOPRAZOLE SODIUM 40 MG: 40 TABLET, DELAYED RELEASE ORAL at 10:10

## 2022-10-07 RX ADMIN — MUPIROCIN: 20 OINTMENT TOPICAL at 08:10

## 2022-10-07 RX ADMIN — HYDROCODONE BITARTRATE AND ACETAMINOPHEN 1 TABLET: 5; 325 TABLET ORAL at 06:10

## 2022-10-07 NOTE — PLAN OF CARE
POC/Meds reviewed, pt verbalized understanding. Vitals stable. Afebrile. Remains on room air. Purewick in place. IS at bedside, instructed on use and return demonstration performed. PRN pain meds administered. Repositions self. Hourly/Q2hr rounding performed, safety maintained. Bed in lowest position, wheels locked, SR up x2, call light in easy reach. No complaints at this time. Will continue to monitor.

## 2022-10-07 NOTE — PLAN OF CARE
During bedside rounding MOON Magallanes discharge recommendation, HH vs SNF, was discussed with pt's daughter, Florencia.  Daughter needed to talk to one other sister  before she can make a decision.      ABHAY texted Florencia a list of HH providers and list of SNF providers via [employer] and f/u with her to make sure she received both list.    ABHAY called Florencia and she stated family decided to take pt home and her sister is in the room and has the HH choice.    ABHAY met with William and family selected [facility] and she signed choice form.  ABHAY called [facility] [phone], spoke to Sisi.  They service [place] and accept PHN. Will send HH orders once done.     10/07/22 1527   Post-Acute Status   Post-Acute Authorization Placement;Home Health   Post-Acute Placement Status Patient List Provided   Home Health Status   (Patient List Provided)   Patient choice form signed by patient/caregiver List from System Post-Acute Care

## 2022-10-07 NOTE — PT/OT/SLP PROGRESS
Physical Therapy Treatment    Patient Name:  Soni Harris   MRN:  [id number]    Recommendations:     Discharge Recommendations:  home health PT, nursing facility, skilled   Discharge Equipment Recommendations: wheelchair   Barriers to discharge: Decreased caregiver support    Assessment:     Soni Harris is a 88 y.o. female admitted with a medical diagnosis of Closed fracture of right hip.  She presents with the following impairments/functional limitations:  weakness, impaired endurance, impaired functional mobility, impaired balance, impaired cognition, decreased lower extremity function, decreased safety awareness, pain, decreased ROM, impaired cardiopulmonary response to activity, orthopedic precautions .    Pt seen BID. Pt tolerated OOB x 1.5 hours, pt calmer and was taken at hallways in chair and was wheeled around. Pt requiring max assist x2 to stand and pivot back to bed. Pt repositioned in bed and purewick re connected to wall suction.    Rehab Prognosis: Fair; patient would benefit from acute skilled PT services to address these deficits and reach maximum level of function.    Recent Surgery: Procedure(s) (LRB):  PINNING, HIP, PERCUTANEOUS (Right) 2 Days Post-Op    Plan:     During this hospitalization, patient to be seen daily (1-2x day) to address the identified rehab impairments via therapeutic activities, therapeutic exercises, wheelchair management/training and progress toward the following goals:    Plan of Care Expires:  10/30/22    Subjective   Daughter William at the bedside  Chief Complaint: none  Patient/Family Comments/goals: none stated  Pain/Comfort:         Objective:     Communicated with nurse Perez prior to session.  Patient found up in chair with PureWick upon PT entry to room.     General Precautions: Standard, fall   Orthopedic Precautions:RLE toe touch weight bearing   Braces:    Respiratory Status: Room air     Functional Mobility:  Bed Mobility:     Scooting: maximal  assistance  Sit to Supine: maximal assistance  Transfers:     Sit to Stand:  maximal assistance with no AD      AM-PAC 6 CLICK MOBILITY          Therapeutic Activities and Exercises:   Patient was educated on the importance of OOB activity and functional mobility to negate negative effects of prolonged bed rest during hospitalization, safe transfers and ambulation, and D/C planning   Wheeled at hallways for stimulation prior to return to bed  Nurse Perez at bedside    Patient left HOB elevated with all lines intact, call button in reach, bed alarm on, and nurse Chris present..    GOALS:   Multidisciplinary Problems       Physical Therapy Goals          Problem: Physical Therapy    Goal Priority Disciplines Outcome Goal Variances Interventions   Physical Therapy Goal     PT, PT/OT Ongoing, Progressing     Description: Goals to be met by: 10-     Patient will increase functional independence with mobility by performin. Supine to sit with Maximum Assistance  2. Sit to stand transfer with Maximum Assistance  3. Bed to chair transfer with Maximum Assistance using Rolling Walker  4. Sitting at edge of bed x20 minutes with Maximum Assistance  5. Lower extremity exercise program x20 reps     RLE TTWB only                           Time Tracking:     PT Received On: 10/07/22  PT Start Time: 1522     PT Stop Time: 1540  PT Total Time (min): 18 min     Billable Minutes: Therapeutic Activity 18    Treatment Type: Treatment  PT/PTA: PT     PTA Visit Number: 0     10/07/2022

## 2022-10-07 NOTE — ASSESSMENT & PLAN NOTE
Acute right hip pain following fall  Right hip xray showing Nondisplaced transverse fracture of the right femoral neck  Orthopedics consulted  POD 2 percutaneous pinning, right femoral neck fracture  with Dr. Jhaveri  PRN pain medication  PT/OT consult post operatively  Fall precautions

## 2022-10-07 NOTE — PT/OT/SLP PROGRESS
Physical Therapy      Patient Name:  Soni Harris   MRN:  [id number]    PT attempted this am- was just medicated and starting to rest per nursing. pT to re attempt in pm

## 2022-10-07 NOTE — PLAN OF CARE
Problem: Physical Therapy  Goal: Physical Therapy Goal  Description: Goals to be met by: 10-     Patient will increase functional independence with mobility by performin. Supine to sit with Maximum Assistance  2. Sit to stand transfer with Maximum Assistance  3. Bed to chair transfer with Maximum Assistance using Rolling Walker  4. Sitting at edge of bed x20 minutes with Maximum Assistance  5. Lower extremity exercise program x20 reps     RLE TTWB only      Outcome: Ongoing, Progressing   Pt seen for thera ex to LE's. Pt calm and agreeable. EOB sitting max assist. Bed to chair  max assist x2 TTWB RLE

## 2022-10-07 NOTE — SUBJECTIVE & OBJECTIVE
Interval History:  Patient seen and examined.  Patient's daughter, Florencia, is at bedside.  Patient was seen by telepsych consult.  No acute events overnight.  Patient is pleasantly confused upon my evaluation.  Discuss discharge options with Florencia who is still trying to contact family members to make a decision regarding SNF or home health.       Review of Systems   Unable to perform ROS: Dementia   Objective:     Vital Signs (Most Recent):  Temp: 97.9 °F (36.6 °C) (10/07/22 0828)  Pulse: 109 (10/07/22 0828)  Resp: 16 (10/07/22 0828)  BP: (!) 143/66 (10/07/22 0828)  SpO2: 97 % (10/07/22 0904)   Vital Signs (24h Range):  Temp:  [97.9 °F (36.6 °C)-99.4 °F (37.4 °C)] 97.9 °F (36.6 °C)  Pulse:  [] 109  Resp:  [16-18] 16  SpO2:  [95 %-97 %] 97 %  BP: (143-185)/(66-99) 143/66     Weight: 60.4 kg (133 lb 3.2 oz)  Body mass index is 22.86 kg/m².    Intake/Output Summary (Last 24 hours) at 10/7/2022 1101  Last data filed at 10/7/2022 0725  Gross per 24 hour   Intake 1753.95 ml   Output --   Net 1753.95 ml        Physical Exam  Vitals and nursing note reviewed.   Constitutional:       General: She is not in acute distress.     Appearance: Normal appearance. She is ill-appearing (Chronically).   HENT:      Head: Normocephalic and atraumatic.      Mouth/Throat:      Mouth: Mucous membranes are moist.      Pharynx: Oropharynx is clear.   Eyes:      Conjunctiva/sclera: Conjunctivae normal.      Pupils: Pupils are equal, round, and reactive to light.   Cardiovascular:      Rate and Rhythm: Normal rate and regular rhythm.      Pulses: Normal pulses.      Heart sounds: Normal heart sounds.   Pulmonary:      Effort: Pulmonary effort is normal.      Breath sounds: Normal breath sounds.   Abdominal:      General: Abdomen is flat. Bowel sounds are normal. There is no distension.      Palpations: Abdomen is soft.      Tenderness: There is no abdominal tenderness. There is no guarding or rebound.   Musculoskeletal:      Comments:  Expected tenderness to right hip.  Dressing C/D/I.   Skin:     General: Skin is warm.      Capillary Refill: Capillary refill takes 2 to 3 seconds.   Neurological:      Mental Status: She is alert. She is disoriented.      Comments: Patient is oriented to self and pleasantly confused.  Patient's daughter, Florencia, is at bedside.    Patient can follow basic commands.     Psychiatric:         Mood and Affect: Mood normal.         Behavior: Behavior normal.       Significant Labs: All pertinent labs within the past 24 hours have been reviewed.  CBC:   Recent Labs   Lab 10/06/22  0327 10/07/22  1035   WBC 11.04 10.27   HGB 10.4* 10.7*   HCT 31.3* 31.3*    135*       CMP:   Recent Labs   Lab 10/06/22  0327      K 4.0      CO2 22*   *   BUN 7*   CREATININE 0.7   CALCIUM 8.2*   PROT 5.6*   ALBUMIN 3.0*   BILITOT 0.7   ALKPHOS 73   AST 16   ALT 13   ANIONGAP 7*         Significant Imaging: I have reviewed all pertinent imaging results/findings within the past 24 hours.

## 2022-10-07 NOTE — PROGRESS NOTES
[facility] Hospital Medicine  Progress Note    Patient Name: Soni Harris  MRN: [id number]  Patient Class: IP- Inpatient   Admission Date: 10/5/2022  Length of Stay: 2 days  Attending Physician: Piotr Villalpando MD  Primary Care Provider: Fran Gilbert MD        Subjective:     Principal Problem:Closed fracture of right hip        HPI:  Soni Harris is a 88 year old black female with a PMHx significant for dementia without behavioral disturbances, HTN, CVA, and CVD presenting for evaluation of right hip pain following a fall at home last night. Patient unable to provide appropriate history due to baseline dementia. History obtained for daughter at bedside. Daughter reports patient lives at home with her. She states patient fell around 9 o clock last night while attempting to get out of bed. Daughter states she heard patient calling for help immediately after the fall. Daughter states patient has habit of getting out of bed multiple times throughout night. Patient reports her right hip pain as a 5/10 without radiation. ED workup significant for right hip xray showing nondisplaced transverse fracture of the right femoral neck. ED provider spoke to orthopedic surgeon on call who recommended NPO for surgery later today. Plan to start on prn pain medications with PT/OT consult postoperatively. Patient will be admitted to inpatient medical services for continued monitoring and treatment.       Overview/Hospital Course:  Soni Harrisis a 88 year old black female with a PMHx significant for dementia, HTN, CVA, and CVD presenting for right hip pain following a fall. Patient was monitored closely throughout course of hospital stay by hospital medicine team. Orthopedic consulted for nondisplaced transverse fracture of right femoral neck. Patient held NPO for surgery with Dr. Jhaveri on 10/5. Patient started on prn pain medication. PT/OT consulted for post op evaluation.       Interval History:   Patient seen and examined.  Patient's daughter, Florencia, is at bedside.  Patient was seen by telepsych consult.  No acute events overnight.  Patient is pleasantly confused upon my evaluation.  Discuss discharge options with Florencia who is still trying to contact family members to make a decision regarding SNF or home health.       Review of Systems   Unable to perform ROS: Dementia   Objective:     Vital Signs (Most Recent):  Temp: 97.9 °F (36.6 °C) (10/07/22 0828)  Pulse: 109 (10/07/22 0828)  Resp: 16 (10/07/22 0828)  BP: (!) 143/66 (10/07/22 0828)  SpO2: 97 % (10/07/22 0904)   Vital Signs (24h Range):  Temp:  [97.9 °F (36.6 °C)-99.4 °F (37.4 °C)] 97.9 °F (36.6 °C)  Pulse:  [] 109  Resp:  [16-18] 16  SpO2:  [95 %-97 %] 97 %  BP: (143-185)/(66-99) 143/66     Weight: 60.4 kg (133 lb 3.2 oz)  Body mass index is 22.86 kg/m².    Intake/Output Summary (Last 24 hours) at 10/7/2022 1101  Last data filed at 10/7/2022 0725  Gross per 24 hour   Intake 1753.95 ml   Output --   Net 1753.95 ml        Physical Exam  Vitals and nursing note reviewed.   Constitutional:       General: She is not in acute distress.     Appearance: Normal appearance. She is ill-appearing (Chronically).   HENT:      Head: Normocephalic and atraumatic.      Mouth/Throat:      Mouth: Mucous membranes are moist.      Pharynx: Oropharynx is clear.   Eyes:      Conjunctiva/sclera: Conjunctivae normal.      Pupils: Pupils are equal, round, and reactive to light.   Cardiovascular:      Rate and Rhythm: Normal rate and regular rhythm.      Pulses: Normal pulses.      Heart sounds: Normal heart sounds.   Pulmonary:      Effort: Pulmonary effort is normal.      Breath sounds: Normal breath sounds.   Abdominal:      General: Abdomen is flat. Bowel sounds are normal. There is no distension.      Palpations: Abdomen is soft.      Tenderness: There is no abdominal tenderness. There is no guarding or rebound.   Musculoskeletal:      Comments: Expected  tenderness to right hip.  Dressing C/D/I.   Skin:     General: Skin is warm.      Capillary Refill: Capillary refill takes 2 to 3 seconds.   Neurological:      Mental Status: She is alert. She is disoriented.      Comments: Patient is oriented to self and pleasantly confused.  Patient's daughter, Florencia, is at bedside.    Patient can follow basic commands.     Psychiatric:         Mood and Affect: Mood normal.         Behavior: Behavior normal.       Significant Labs: All pertinent labs within the past 24 hours have been reviewed.  CBC:   Recent Labs   Lab 10/06/22  0327 10/07/22  1035   WBC 11.04 10.27   HGB 10.4* 10.7*   HCT 31.3* 31.3*    135*       CMP:   Recent Labs   Lab 10/06/22  0327      K 4.0      CO2 22*   *   BUN 7*   CREATININE 0.7   CALCIUM 8.2*   PROT 5.6*   ALBUMIN 3.0*   BILITOT 0.7   ALKPHOS 73   AST 16   ALT 13   ANIONGAP 7*         Significant Imaging: I have reviewed all pertinent imaging results/findings within the past 24 hours.      Assessment/Plan:      * Closed fracture of right hip  Acute right hip pain following fall  Right hip xray showing Nondisplaced transverse fracture of the right femoral neck  Orthopedics consulted  POD 2 percutaneous pinning, right femoral neck fracture  with Dr. Jhaveri  PRN pain medication  PT/OT consult post operatively  Fall precautions      Late onset Alzheimer's dementia with behavioral disturbance  Dementia at baseline  Chronic  Resume home medications  Delirium precautions  Follows neurology outpatient  Consult tele psych: who recommended prn  Haldol for agitation      CVD (cardiovascular disease)  Chronic  Resume home medications        GERD (gastroesophageal reflux disease)  Chronic  Resume home PPI      Hypertension  Chronic, controlled.  Latest blood pressure and vitals reviewed-   Temp:  [97.1 °F (36.2 °C)-98.8 °F (37.1 °C)]   Pulse:  []   Resp:  [12-22]   BP: (136-198)/(65-92)   SpO2:  [91 %-100 %] .   Home meds for  hypertension were reviewed and noted below.   Hypertension Medications             amlodipine (NORVASC) 2.5 MG tablet Take 2.5 mg by mouth once daily.    amLODIPine (NORVASC) 5 MG tablet Take 5 mg by mouth once daily.    losartan (COZAAR) 50 MG tablet Take 50 mg by mouth once daily.    metoprolol succinate (TOPROL-XL) 50 MG 24 hr tablet Take 50 mg by mouth once daily.    metoprolol tartrate (LOPRESSOR) 25 MG tablet Take 22 mg by mouth once daily.          While in the hospital, will manage blood pressure as follows; Continue home antihypertensive regimen    Will utilize p.r.n. blood pressure medication only if patient's blood pressure greater than  180/110 and she develops symptoms such as worsening chest pain or shortness of breath.        CVA (cerebral infarction)  History of CVA  Chronic, resume home ASA    VTE Risk Mitigation (From admission, onward)         Ordered     enoxaparin injection 40 mg  Daily         10/05/22 1646     IP VTE HIGH RISK PATIENT  Once         10/05/22 0855     Place sequential compression device  Until discontinued         10/05/22 0855     Reason for No Pharmacological VTE Prophylaxis  Once        Question:  Reasons:  Answer:  Risk of Bleeding    10/05/22 0855                Discharge Planning   TRIXIE: 10/10/2022     Code Status: Full Code   Is the patient medically ready for discharge?:     Reason for patient still in hospital (select all that apply): Treatment  Discharge Plan A: Skilled Nursing Facility                  Gilma Cheung PA-C  Department of Hospital Medicine   [facility]

## 2022-10-07 NOTE — PT/OT/SLP PROGRESS
Occupational Therapy   Treatment    Name: Soni Harris  MRN: [id number]  Admitting Diagnosis:  Closed fracture of right hip  2 Days Post-Op    Recommendations:     Discharge Recommendations: nursing facility, skilled, home with home health  Discharge Equipment Recommendations:  bath bench, wheelchair, respiratory supplies  Barriers to discharge:  None    Assessment:     Soni Harris is a 88 y.o. female with a medical diagnosis of Closed fracture of right hip.  She presents with the following performance deficits affecting function are weakness, impaired endurance, impaired self care skills, impaired functional mobility, gait instability, decreased lower extremity function, impaired cognition, decreased safety awareness, pain, decreased ROM, edema, orthopedic precautions.     Patient transferred chair>bed for squat pivot with Max (A) x 3 people for safety and to ensure adherence to R LE TTWB precautions. Patient becoming quite agitated during transfer - grabbing at therapist, swatting and attempting multiple times to pull L UE peripheral IV. Once in bed, patient began removing hospital gown and attempting to pull peripheral IV. Halel Osorio notified and immediately arriving to protect are.    Rehab Prognosis:  Fair; patient would benefit from acute skilled OT services to address these deficits and reach maximum level of function.       Plan:     Patient to be seen 4 x/week to address the above listed problems via self-care/home management, therapeutic activities, therapeutic exercises  Plan of Care Expires: 10/27/22  Plan of Care Reviewed with: patient, daughter    Subjective     Pain/Comfort:   Not rated - R hip - premedicated, repositioned, cessation of activity    Objective:     Communicated with: Halle Osorio prior to session.  Patient found up in chair with chair check, peripheral IV (daughter present) upon OT entry to room.    General Precautions: Standard, fall   Orthopedic Precautions:RLE toe touch  weight bearing   Braces:    Respiratory Status: Room air     Occupational Performance:     Bed Mobility:    Patient completed Sit to Supine with Mod (A) x 2    Scooting with Max (A) x 2 using draw sheet/rafaela pad    Functional Mobility/Transfers:  Patient completed Chair <> Bed Transfer using Squat Pivot technique with Max (A) x 3 people for safety and adherence to R LE TTWB precautions  with encouragement throughout; patient becoming agitated as OTR repositioning R LE to prevent application of greater than TTWB - patient began grabbing and pushing at therapist    Activities of Daily Living:    [facility] 6 Click ADL:  12    Treatment & Education:  - OTR reinforcing education/instruction regarding correct transfer sequence/techniques; patient will require reinforcement; patient attempting to pull out L UE peripheral IV - OTR and daughter attempting to provide patient with towel and cell phone to occupy and distract patient from pulling IV     Patient left HOB elevated with all lines intact, call button within reach, patient's daughter and nurseHalle present to provide protection of L UE peripheral IV    GOALS:   Multidisciplinary Problems       Occupational Therapy Goals          Problem: Occupational Therapy    Goal Priority Disciplines Outcome Interventions   Occupational Therapy Goal     OT, PT/OT     Description: Goals to be met by: 10/27/2022     Patient will increase functional independence with ADLs by performing:    UE Dressing with Supervision.  LE Dressing with Minimal Assistance with AD/AE as needed and in adherence with R LE TTWB.  Grooming while seated with Set-up Assistance.  Toileting from toilet with Minimal Assistance for hygiene and clothing management with AD as needed and in adherence with R LE TTWB.   Toilet transfer to toilet with Minimal Assistance with AD and in adherence with R LE TTWB.                         Time Tracking:     OT Date of Treatment: 10/06/22  OT Start Time: 1235  OT Stop  Time: 1247  OT Total Time (min): 12 min    Billable Minutes:Therapeutic Activity 12    OT/CHIN: OT     CHIN Visit Number: 0    10/7/2022

## 2022-10-07 NOTE — CARE UPDATE
10/06/22 1929   Patient Assessment/Suction   Level of Consciousness (AVPU) alert   Respiratory Effort Normal;Unlabored   Expansion/Accessory Muscles/Retractions no use of accessory muscles;no retractions   All Lung Fields Breath Sounds diminished   Rhythm/Pattern, Respiratory depth regular;pattern regular   PRE-TX-O2   O2 Device (Oxygen Therapy) room air   SpO2 97 %   Pulse Oximetry Type Intermittent   $ Pulse Oximetry - Single Charge Pulse Oximetry - Single   Incentive Spirometer   $ Incentive Spirometer Charges unable to perform

## 2022-10-07 NOTE — CARE UPDATE
10/07/22 0807   Patient Assessment/Suction   Level of Consciousness (AVPU) alert   PRE-TX-O2   O2 Device (Oxygen Therapy) room air   SpO2 97 %   Pulse Oximetry Type Intermittent   $ Pulse Oximetry - Multiple Charge Pulse Oximetry - Multiple   Incentive Spirometer   $ Incentive Spirometer Charges done with encouragement;other (see comments);unable to perform  (Multiple attempts. Pt unable to follow comprehend)

## 2022-10-07 NOTE — DISCHARGE INSTRUCTIONS
Discharge Instructions, [facility] Medicine    Continue to taking pain medication as needed. Follow up with Dr. Garcia in 2-3 weeks for post op visit.     Thank you for choosing [facility] for your medical care.     You were admitted to the hospital with Closed fracture of right hip.     Please note your discharge instructions, including diet/activity restrictions, follow-up appointments, and medication changes.  If you have any questions about your medical issues, prescriptions, or any other questions, please feel free to contact the [facility] Medicine Dept at [phone] and we will help.    If you are previously with Home health, outpatient PT/OT or under a therapy program, you are cleared to return to those programs.    Please direct all long term medication refills and follow up to your primary care provider, Fran Gilbert MD. Thank you again for letting us take care of your health care needs.    Please note the following discharge instructions per your discharging physician-  Tyron Roach PA-C

## 2022-10-07 NOTE — PROGRESS NOTES
Daily Orthopaedic Progress Note    Soni Harris is a 88 y.o. female admitted on 10/5/2022  Hospital Day: 2  Post Op Day: 2 Days Post-Op      The patient was seen and examined this morning at the bedside. Patient reports no acute issues overnight and adequate control of pain on current regimen.  Patient has worked with physical therapy over the last 24 hours.     PHYSICAL EXAM:  Awake/alert/oriented x2, No acute distress, Afebrile, Vital signs stable  Good inspiratory effort with unlaboured breathing  Dressings C/D/I   Operative limb neurovascularly intact    Vitals:    10/07/22 0807 10/07/22 0828 10/07/22 0904 10/07/22 1133   BP:  (!) 143/66     BP Location:       Patient Position:       Pulse:  109     Resp:  16  18   Temp:  97.9 °F (36.6 °C)     TempSrc:       SpO2: 97%  97%    Weight:       Height:         I/O last 3 completed shifts:  In: 1789.2 [P.O.:825; I.V.:964.2]  Out: 55 [Urine:55]  Recent Labs   Lab 10/05/22  0624 10/06/22  0327 10/07/22  1035   CALCIUM 8.8 8.2* 8.5*   PROT  --  5.6* 5.9*    136 138   K 4.0 4.0 4.2   CO2 21* 22* 20*    107 109   BUN 11 7* 7*   CREATININE 0.7 0.7 0.7     Recent Labs   Lab 10/05/22  0624 10/06/22  0327 10/07/22  1035   WBC 8.68 11.04 10.27   RBC 3.51* 3.40* 3.45*   HGB 10.8* 10.4* 10.7*   HCT 32.1* 31.3* 31.3*    157 135*     No results for input(s): PT, INR, APTT in the last 72 hours.    Assessment/Plan     88 y.o. female 2 Days Post-Op s/p right hip pinning     -Continue with current pain control regimen   -Continue with current physical therapy plan, Weight bearing: ok to advance to WBAT to encourage mobility   -Continue with DVT prophylaxis, ASA BID    Home with  vs SNF

## 2022-10-07 NOTE — CONSULTS
[facility]  Psychiatry  Telepsychiatry Consult Note    Please see previous notes:    Patient agreeable to consultation via telepsychiatry.    Tele-Consultation from Psychiatry started: 10/6/2022 at 1032  The chief complaint leading to psychiatric consultation is: agitation   This consultation was requested by attending physician.  The location of the consulting psychiatrist is  [facility] .  The patient location is  [facility] MEDICAL SURGICAL UNIT*     Also present with the patient at the time of the consultation: pt daughter    Patient Identification:   Soni Harris is a 88 y.o. female.    Patient information was obtained from patient and relative(s).    Consults  Teleconsult Time Documentation  Subjective:     History of Present Illness:  No notes on file Soni Harris is a 88 year old black female with a PMHx significant for dementia without behavioral disturbances, HTN, CVA, and CVD presenting for evaluation of right hip pain following a fall at home last night. Patient unable to provide appropriate history due to baseline dementia.     Psychiatric History:   Previous Psychiatric Hospitalizations: No   Previous Medication Trials: Yes   Previous Suicide Attempts: no   History of Violence: no  History of Depression: no  History of Bernie: no  History of Auditory/Visual Hallucination yes  History of Delusions: no  Outpatient psychiatrist (current & past): No    Substance Abuse History:  Tobacco:No  Alcohol: No  Illicit Substances:No  Detox/Rehab: No    Legal History: Past charges/incarcerations: No     Family Psychiatric History: denies      Social History:  Developmental/Childhood:Achieved all developmental milestones timely  *Education:High School Diploma  Employment Status/Finances:Retired   Relationship Status/Sexual Orientation: : Relationship intact  Children: 1  Housing Status: Home    history:  NO  Access to gun: NO  Jainism:Actively participates in organized  "Congregational  Recreational activities:Time with family    Psychiatric Mental Status Exam:  Arousal: alert  Sensorium/Orientation: oriented to person  Behavior/Cooperation: normal, cooperative   Speech: normal tone, normal rate, normal pitch, soft  Language: repetition impaired  Mood: " ok "   Affect: blunted  Thought Process: illogical  Thought Content:   Auditory hallucinations: NO  Visual hallucinations: NO  Paranoia: NO  Delusions:  NO  Suicidal ideation: NO  Homicidal ideation: NO  Attention/Concentration:  unable to spell "WORLD" backwards  Memory:    Recent:  Decreased   Remote: Decreased   3/3 immediate, 0/3 at 5 min  Fund of Knowledge: Impaired   Abstract reasoning: proverbs were concrete  Insight: poor awareness of illness  Judgment: limited      Past Medical History:   Past Medical History:   Diagnosis Date    Arthritis     GERD (gastroesophageal reflux disease)     Glaucoma (increased eye pressure)     Hypertension     Stroke       Laboratory Data:   Labs Reviewed   CBC W/ AUTO DIFFERENTIAL - Abnormal; Notable for the following components:       Result Value    RBC 3.51 (*)     Hemoglobin 10.8 (*)     Hematocrit 32.1 (*)     All other components within normal limits   BASIC METABOLIC PANEL - Abnormal; Notable for the following components:    CO2 21 (*)     Glucose 121 (*)     Anion Gap 7 (*)     All other components within normal limits   URINALYSIS, REFLEX TO URINE CULTURE - Abnormal; Notable for the following components:    Appearance, UA Hazy (*)     Urobilinogen, UA 2.0-3.0 (*)     Leukocytes, UA Trace (*)     All other components within normal limits    Narrative:     Specimen Source->Urine   URINALYSIS MICROSCOPIC - Abnormal; Notable for the following components:    Bacteria Moderate (*)     All other components within normal limits    Narrative:     Specimen Source->Urine   SARS-COV-2 RNA AMPLIFICATION, QUAL   CK       Neurological History:  Seizures: No  Head trauma: No    Allergies:   Review of " patient's allergies indicates:  No Known Allergies    Medications in ER:   Medications   amLODIPine tablet 5 mg (5 mg Oral Given 10/6/22 0918)   atorvastatin tablet 20 mg (20 mg Oral Given 10/6/22 0919)   donepeziL tablet 10 mg (10 mg Oral Given 10/6/22 0918)   losartan tablet 50 mg (50 mg Oral Given 10/6/22 0918)   memantine tablet 10 mg (10 mg Oral Given 10/6/22 0918)   metoprolol succinate (TOPROL-XL) 24 hr tablet 50 mg (50 mg Oral Given 10/6/22 0919)   pantoprazole EC tablet 40 mg (40 mg Oral Given 10/6/22 0918)   naloxone 0.4 mg/mL injection 0.02 mg (has no administration in time range)   glucose chewable tablet 16 g (has no administration in time range)   glucose chewable tablet 24 g (has no administration in time range)   dextrose 10% bolus 125 mL (has no administration in time range)   dextrose 10% bolus 250 mL (has no administration in time range)   glucagon (human recombinant) injection 1 mg (has no administration in time range)   potassium bicarbonate disintegrating tablet 50 mEq (has no administration in time range)   potassium bicarbonate disintegrating tablet 35 mEq (has no administration in time range)   potassium bicarbonate disintegrating tablet 60 mEq (has no administration in time range)   magnesium oxide tablet 800 mg (has no administration in time range)   magnesium oxide tablet 800 mg (has no administration in time range)   potassium, sodium phosphates 280-160-250 mg packet 2 packet (has no administration in time range)   potassium, sodium phosphates 280-160-250 mg packet 2 packet (has no administration in time range)   potassium, sodium phosphates 280-160-250 mg packet 2 packet (has no administration in time range)   ondansetron injection 4 mg (has no administration in time range)   insulin aspart U-100 pen 0-5 Units (has no administration in time range)   acetaminophen tablet 650 mg (650 mg Oral Given 10/6/22 1137)   aluminum-magnesium hydroxide-simethicone 200-200-20 mg/5 mL suspension 30 mL  (has no administration in time range)   melatonin tablet 6 mg (has no administration in time range)   0.9%  NaCl infusion ( Intravenous New Bag 10/6/22 1139)   sodium chloride 0.9% flush 10 mL (has no administration in time range)   mupirocin 2 % ointment ( Nasal Given 10/6/22 2037)   HYDROcodone-acetaminophen 5-325 mg per tablet 1 tablet (1 tablet Oral Given 10/6/22 0920)   oxyCODONE immediate release tablet Tab 10 mg (10 mg Oral Given 10/6/22 1749)   ondansetron disintegrating tablet 8 mg (has no administration in time range)   acetaminophen suppository 650 mg (has no administration in time range)   enoxaparin injection 40 mg (40 mg Subcutaneous Given 10/6/22 0919)   morphine injection 2 mg (2 mg Intravenous Given 10/5/22 0619)   cefazolin (ANCEF) 2 gram in dextrose 5% 50 mL IVPB (premix) (2 g Intravenous Given 10/5/22 1544)   morphine injection 2 mg (2 mg Intravenous Given 10/5/22 1521)   cefazolin (ANCEF) 2 gram in dextrose 5% 50 mL IVPB (premix) (0 g Intravenous Stopped 10/6/22 0956)       Medications at home: denies    No new subjective & objective note has been filed under this hospital service since the last note was generated.      Assessment - Diagnosis - Goals:     Diagnosis/Impression: Unspecified neurocognitive disorder    Rec: med recs for agitation. Haldol 2 mg po/IM q 6 hours PRN for acute agitation     Time with patient: 20 min      More than 50% of the time was spent counseling/coordinating care    Consulting clinician was informed of the encounter and consult note.    Consultation ended: 10/6/2022 at 1121    Prateek Gonzalez MD   Psychiatry  [facility]

## 2022-10-07 NOTE — ASSESSMENT & PLAN NOTE
Dementia at baseline  Chronic  Resume home medications  Delirium precautions  Follows neurology outpatient  Consult tele psych: who recommended prn  Haldol for agitation

## 2022-10-07 NOTE — PLAN OF CARE
Plan of care discussed with pt and daughters. Pt oriented to self. Dressing CDI. Dorsalis pedis pulses +2, cap refill less than 3. Telepsych consult completed. Pain controlled with PRN meds. Patient calm throughout the night. Blood glucose monitored. Bed in lowest position, wheels locked. Call light within reach.

## 2022-10-08 VITALS
HEART RATE: 104 BPM | OXYGEN SATURATION: 100 % | TEMPERATURE: 99 F | WEIGHT: 133.19 LBS | BODY MASS INDEX: 22.74 KG/M2 | HEIGHT: 64 IN | SYSTOLIC BLOOD PRESSURE: 145 MMHG | DIASTOLIC BLOOD PRESSURE: 72 MMHG | RESPIRATION RATE: 18 BRPM

## 2022-10-08 LAB
ALBUMIN SERPL BCP-MCNC: 2.8 G/DL (ref 3.5–5.2)
ALP SERPL-CCNC: 66 U/L (ref 55–135)
ALT SERPL W/O P-5'-P-CCNC: 11 U/L (ref 10–44)
ANION GAP SERPL CALC-SCNC: 9 MMOL/L (ref 8–16)
AST SERPL-CCNC: 18 U/L (ref 10–40)
BASOPHILS # BLD AUTO: 0.05 K/UL (ref 0–0.2)
BASOPHILS NFR BLD: 0.6 % (ref 0–1.9)
BILIRUB SERPL-MCNC: 0.8 MG/DL (ref 0.1–1)
BUN SERPL-MCNC: 10 MG/DL (ref 8–23)
CALCIUM SERPL-MCNC: 8.6 MG/DL (ref 8.7–10.5)
CHLORIDE SERPL-SCNC: 109 MMOL/L (ref 95–110)
CO2 SERPL-SCNC: 23 MMOL/L (ref 23–29)
CREAT SERPL-MCNC: 0.7 MG/DL (ref 0.5–1.4)
DIFFERENTIAL METHOD: ABNORMAL
EOSINOPHIL # BLD AUTO: 0.3 K/UL (ref 0–0.5)
EOSINOPHIL NFR BLD: 4.3 % (ref 0–8)
ERYTHROCYTE [DISTWIDTH] IN BLOOD BY AUTOMATED COUNT: 13.6 % (ref 11.5–14.5)
EST. GFR  (NO RACE VARIABLE): >60 ML/MIN/1.73 M^2
GLUCOSE SERPL-MCNC: 104 MG/DL (ref 70–110)
HCT VFR BLD AUTO: 24.9 % (ref 37–48.5)
HGB BLD-MCNC: 8.4 G/DL (ref 12–16)
IMM GRANULOCYTES # BLD AUTO: 0.03 K/UL (ref 0–0.04)
IMM GRANULOCYTES NFR BLD AUTO: 0.4 % (ref 0–0.5)
LYMPHOCYTES # BLD AUTO: 2 K/UL (ref 1–4.8)
LYMPHOCYTES NFR BLD: 24.7 % (ref 18–48)
MCH RBC QN AUTO: 30.8 PG (ref 27–31)
MCHC RBC AUTO-ENTMCNC: 33.7 G/DL (ref 32–36)
MCV RBC AUTO: 91 FL (ref 82–98)
MONOCYTES # BLD AUTO: 1.1 K/UL (ref 0.3–1)
MONOCYTES NFR BLD: 13.9 % (ref 4–15)
NEUTROPHILS # BLD AUTO: 4.4 K/UL (ref 1.8–7.7)
NEUTROPHILS NFR BLD: 56.1 % (ref 38–73)
NRBC BLD-RTO: 0 /100 WBC
PLATELET # BLD AUTO: 128 K/UL (ref 150–450)
PMV BLD AUTO: 11.9 FL (ref 9.2–12.9)
POCT GLUCOSE: 178 MG/DL (ref 70–110)
POTASSIUM SERPL-SCNC: 3.6 MMOL/L (ref 3.5–5.1)
PROT SERPL-MCNC: 5.8 G/DL (ref 6–8.4)
RBC # BLD AUTO: 2.73 M/UL (ref 4–5.4)
SODIUM SERPL-SCNC: 141 MMOL/L (ref 136–145)
WBC # BLD AUTO: 7.92 K/UL (ref 3.9–12.7)

## 2022-10-08 PROCEDURE — 25000003 PHARM REV CODE 250: Performed by: ORTHOPAEDIC SURGERY

## 2022-10-08 PROCEDURE — 94799 UNLISTED PULMONARY SVC/PX: CPT

## 2022-10-08 PROCEDURE — 99900035 HC TECH TIME PER 15 MIN (STAT)

## 2022-10-08 PROCEDURE — 80053 COMPREHEN METABOLIC PANEL: CPT | Performed by: ORTHOPAEDIC SURGERY

## 2022-10-08 PROCEDURE — 85025 COMPLETE CBC W/AUTO DIFF WBC: CPT | Performed by: ORTHOPAEDIC SURGERY

## 2022-10-08 PROCEDURE — 36415 COLL VENOUS BLD VENIPUNCTURE: CPT | Performed by: ORTHOPAEDIC SURGERY

## 2022-10-08 PROCEDURE — 25000003 PHARM REV CODE 250

## 2022-10-08 PROCEDURE — 97530 THERAPEUTIC ACTIVITIES: CPT

## 2022-10-08 PROCEDURE — 94761 N-INVAS EAR/PLS OXIMETRY MLT: CPT

## 2022-10-08 RX ORDER — OXYCODONE AND ACETAMINOPHEN 10; 325 MG/1; MG/1
1 TABLET ORAL EVERY 8 HOURS PRN
Qty: 30 TABLET | Refills: 0 | Status: SHIPPED | OUTPATIENT
Start: 2022-10-08 | End: 2022-10-18

## 2022-10-08 RX ORDER — ASPIRIN 325 MG
325 TABLET ORAL DAILY
Qty: 30 TABLET | Refills: 11
Start: 2022-10-08 | End: 2023-10-08

## 2022-10-08 RX ADMIN — PANTOPRAZOLE SODIUM 40 MG: 40 TABLET, DELAYED RELEASE ORAL at 09:10

## 2022-10-08 RX ADMIN — OXYCODONE HYDROCHLORIDE 10 MG: 10 TABLET ORAL at 09:10

## 2022-10-08 RX ADMIN — HYDROCODONE BITARTRATE AND ACETAMINOPHEN 1 TABLET: 5; 325 TABLET ORAL at 02:10

## 2022-10-08 RX ADMIN — LOSARTAN POTASSIUM 50 MG: 25 TABLET, FILM COATED ORAL at 09:10

## 2022-10-08 RX ADMIN — ASPIRIN 81 MG CHEWABLE TABLET 81 MG: 81 TABLET CHEWABLE at 09:10

## 2022-10-08 RX ADMIN — DONEPEZIL HYDROCHLORIDE 10 MG: 5 TABLET, FILM COATED ORAL at 09:10

## 2022-10-08 RX ADMIN — AMLODIPINE BESYLATE 5 MG: 5 TABLET ORAL at 09:10

## 2022-10-08 RX ADMIN — SODIUM CHLORIDE: 0.9 INJECTION, SOLUTION INTRAVENOUS at 02:10

## 2022-10-08 RX ADMIN — METOPROLOL SUCCINATE 50 MG: 50 TABLET, EXTENDED RELEASE ORAL at 09:10

## 2022-10-08 RX ADMIN — ATORVASTATIN CALCIUM 20 MG: 20 TABLET, FILM COATED ORAL at 09:10

## 2022-10-08 NOTE — PLAN OF CARE
Orders placed for WC and hospital bed. RAÚL notified Fredy SCHUMACHER with [facility].       10/08/22 1021   Post-Acute Status   Post-Acute Authorization HME   E Status Referrals Sent

## 2022-10-08 NOTE — PT/OT/SLP PROGRESS
Physical Therapy Treatment    Patient Name:  Soni Harris   MRN:  [id number]    Recommendations:     Discharge Recommendations:  home health PT, nursing facility, skilled   Discharge Equipment Recommendations: hospital bed     Assessment:     Soni Harris is a 88 y.o. female admitted with a medical diagnosis of Closed fracture of right hip.  She presents with the following impairments/functional limitations:  weakness, impaired endurance, impaired balance, impaired functional mobility, decreased lower extremity function, orthopedic precautions, pain, impaired cognition  .    Rehab Prognosis: Fair; patient would benefit from acute skilled PT services to address these deficits and reach maximum level of function.    Recent Surgery: Procedure(s) (LRB):  PINNING, HIP, PERCUTANEOUS (Right) 3 Days Post-Op    Plan:     During this hospitalization, patient to be seen daily to address the identified rehab impairments via gait training, therapeutic activities, therapeutic exercises and progress toward the following goals:    Plan of Care Expires:  10/30/22    Subjective     Patient/Family Comments/goals: agrees to get to chair      Objective:     Communicated with nurse (Chris) prior to session.  Patient found supine with peripheral IV, PureWick, bed alarm upon PT entry to room.     General Precautions: Standard, fall   Orthopedic Precautions:RLE toe touch weight bearing   Braces: N/A  Respiratory Status: Room air     Functional Mobility training:  Bed Mobility:     Rolling Left:  maximal assistance  Scooting: maximal assistance and to scoot forward in sitting  Supine to Sit: maximal assistance  Transfers:     Sit to Stand:  maximal assistance and total assistance with no AD  Bed to Chair: maximal assistance and total assistance with  no AD  using  Stand Pivot and pivot to left  Gait: N/A due to weakness, apprehension/confusion      AM-PAC 6 CLICK MOBILITY  Turning over in bed (including adjusting bedclothes, sheets  and blankets)?: 2  Sitting down on and standing up from a chair with arms (e.g., wheelchair, bedside commode, etc.): 2  Moving from lying on back to sitting on the side of the bed?: 2  Moving to and from a bed to a chair (including a wheelchair)?: 2  Need to walk in hospital room?: 1  Climbing 3-5 steps with a railing?: 1  Basic Mobility Total Score: 10       Therapeutic Activities and Exercises:  Mobility training as above with cues for technique  Attempted second stand from chair, but patient resists after initially agreeing.    Patient left up in chair with all lines intact, call button in reach, chair alarm on, nurse (Chris) notified, and family present.    GOALS:   Multidisciplinary Problems       Physical Therapy Goals          Problem: Physical Therapy    Goal Priority Disciplines Outcome Goal Variances Interventions   Physical Therapy Goal     PT, PT/OT Ongoing, Progressing     Description: Goals to be met by: 10-     Patient will increase functional independence with mobility by performin. Supine to sit with Maximum Assistance  2. Sit to stand transfer with Maximum Assistance  3. Bed to chair transfer with Maximum Assistance using Rolling Walker  4. Sitting at edge of bed x20 minutes with Maximum Assistance  5. Lower extremity exercise program x20 reps     RLE TTWB only                           Time Tracking:     PT Received On: 10/08/22  PT Start Time: 1035     PT Stop Time: 1100  PT Total Time (min): 25 min     Billable Minutes: Therapeutic Activity 25    Treatment Type: Treatment  PT/PTA: PT     PTA Visit Number: 0     10/08/2022

## 2022-10-08 NOTE — PLAN OF CARE
[facility] accepted pt and will see on Monday. CM updated pt and family at bedside. PHN auth obtained through portal. Auth# [id number]       10/08/22 1200   Post-Acute Status   Post-Acute Authorization Home Health   Home Health Status Set-up Complete/Auth obtained

## 2022-10-08 NOTE — NURSING
D/c education provided to pt and family, family verbalized understanding. PIV removed, belongings and script in hand, pt left floor safely via wheelchair.

## 2022-10-08 NOTE — PLAN OF CARE
Pt clear for DC from case management standpoint. Discharging to home with [facility].      No apts scheduled due to weekend       10/08/22 1220   Final Note   Assessment Type Final Discharge Note   Anticipated Discharge Disposition Home-Health

## 2022-10-08 NOTE — PLAN OF CARE
HH orders sent to [facility] via [employer] per pt's family request.        10/08/22 1022   Post-Acute Status   Post-Acute Authorization Home Health   Home Health Status Referrals Sent

## 2022-10-08 NOTE — PLAN OF CARE
Per Fredy Owen with [facility], payment collected over the phone with pt's daughter. States tub tx bench will be delivered to home address with hospital bed.        10/08/22 1219   Post-Acute Status   Post-Acute Authorization HME   E Status Set-up Complete/Auth obtained

## 2022-10-08 NOTE — PLAN OF CARE
Plan of care discussed with pt and daughters. Pt oriented to self. Dressing CDI. Dorsalis pedis pulses +2, cap refill less than 3. Pain controlled with PRN meds. IV fluids infusing. Purewick on, voiding dark yellow urine. Fluids encouraged. Patient calm throughout the night. POA (daughter) declined midnight vitals and blood glucose check to allow pt to have more rest. Bed in lowest position, wheels locked. Call light within reach.

## 2022-10-08 NOTE — CARE UPDATE
10/07/22 5548   Patient Assessment/Suction   Level of Consciousness (AVPU) responds to voice   Respiratory Effort Unlabored   Expansion/Accessory Muscles/Retractions no use of accessory muscles;no retractions;expansion symmetric   Rhythm/Pattern, Respiratory unlabored;pattern regular;depth regular   PRE-TX-O2   O2 Device (Oxygen Therapy) room air   SpO2 100 %   Pulse Oximetry Type Intermittent   $ Pulse Oximetry - Multiple Charge Pulse Oximetry - Multiple   Pulse 83   Resp 18   Incentive Spirometer   $ Incentive Spirometer Charges unable to perform   Administration (IS) unable to perform

## 2022-10-08 NOTE — PLAN OF CARE
Fredy Owen from [facility] approved RW and hospital bed. RAÚL delivered WC to pt's bedside. Hospital bed being arranged by Julia for delivery today. Updated family at bedside. Family now requesting tub tx bench- explained that item is cash only and not an insurance item. Provided estimated cost of $75. States they will pay.   Orders placed for tub tx bench- updated Julia. Awaiting approval to deliver. Per Fredy, tub tx bench have to be paid for up front, being billed is not an option       10/08/22 1200   Post-Acute Status   Post-Acute Authorization E   E Status Pending post-acute provider review/more information requested

## 2022-10-09 ENCOUNTER — NURSE TRIAGE (OUTPATIENT)
Dept: ADMINISTRATIVE | Facility: CLINIC | Age: 87
End: 2022-10-09
Payer: MEDICARE

## 2022-10-09 NOTE — TELEPHONE ENCOUNTER
Reason for Disposition   [1] Caller has URGENT medicine question about med that PCP or specialist prescribed AND [2] triager unable to answer question    Protocols used: Medication Question Call-A-AH        Keren Hess, daughter of Soni, called to say she was discharged on Percocet yesterday for post op pain.  The family states that she had hallucinations when in hospital following Percocet 10 mg administration, and  she is having hallucinations at home following Percocet when given.  She is saying that family is trying to kill her, does not want to be touched, but only after Percocet and only at night.  She said she was having none of these problems prior to her recent hospitalization for ortho surgery, even though has diagnosis of dementia, and they only occur now following Percocet 10 mg. She wants to know if ibuprofen will be ok, and if not, can something else be given?  They are giving her Percocet 10 mg every eight hours at present.  Call to Dr Jonel Portillo, on call, and provided above information. He is aware that she is not on any blood thinners, and her BUN /creat is 10/0.7.  He recommends holding Percocet, and trying ibuprofen 400 mg every 6 hours, as needed for pain.  Administer with food, and hydrate well with water. If this holds her pain, then continue ibuprofen as needs, and Percocet not necessary. If she does require Percocet during the earlier part of the day, it can be given for severe pain, but Dr Portillo recommends that they hold Percocet after 5 pm.  Keren states understanding. She states mom also has not had BM since discharge 10/08.  Explained that opiates around the clock can encourage constipation.  Encouraged increase water intake, mobilize as tolerated, and stool softener / high fiber intake, with call back if symptoms do not resolve, or for any new concerns, questions, worsening symptoms.  She states understanding.  Message to Fran Gilbert MD, pcp, Dr Dickinson, and   Bandar. Please contact caller directly with any additional care advice.

## 2022-10-10 ENCOUNTER — TELEPHONE (OUTPATIENT)
Dept: ORTHOPEDICS | Facility: CLINIC | Age: 87
End: 2022-10-10
Payer: MEDICARE

## 2022-10-10 ENCOUNTER — PATIENT OUTREACH (OUTPATIENT)
Dept: ADMINISTRATIVE | Facility: CLINIC | Age: 87
End: 2022-10-10
Payer: MEDICARE

## 2022-10-10 DIAGNOSIS — S72.001A HIP FX, RIGHT, CLOSED, INITIAL ENCOUNTER: Primary | ICD-10-CM

## 2022-10-10 DIAGNOSIS — S72.001D CLOSED FRACTURE OF RIGHT HIP WITH ROUTINE HEALING, SUBSEQUENT ENCOUNTER: Primary | ICD-10-CM

## 2022-10-10 PROCEDURE — G0180 PR HOME HEALTH MD CERTIFICATION: ICD-10-PCS | Mod: ,,, | Performed by: HOSPITALIST

## 2022-10-10 PROCEDURE — G0180 MD CERTIFICATION HHA PATIENT: HCPCS | Mod: ,,, | Performed by: HOSPITALIST

## 2022-10-10 NOTE — PROGRESS NOTES
C3 nurse attempted to contact Soni Harris's daughter Florencia Berman for a TCC post hospital discharge follow up call. No answer. The patient does not have a scheduled HOSFU appointment, and the pt does not have an [facility] PCP.

## 2022-10-10 NOTE — DISCHARGE SUMMARY
[facility] Hospital Medicine  Discharge Summary      Patient Name: Soni Harris  MRN: [id number]  Patient Class: IP- Inpatient  Admission Date: 10/5/2022  Hospital Length of Stay: 3 days  Discharge Date and Time: 10/8/2022  1:00 PM  Attending Physician: No att. providers found   Discharging Provider: Tyron Roach PA-C  Primary Care Provider: Fran Gilbert MD      HPI:   Soni Harris is a 88 year old black female with a PMHx significant for dementia without behavioral disturbances, HTN, CVA, and CVD presenting for evaluation of right hip pain following a fall at home last night. Patient unable to provide appropriate history due to baseline dementia. History obtained for daughter at bedside. Daughter reports patient lives at home with her. She states patient fell around 9 o clock last night while attempting to get out of bed. Daughter states she heard patient calling for help immediately after the fall. Daughter states patient has habit of getting out of bed multiple times throughout night. Patient reports her right hip pain as a 5/10 without radiation. ED workup significant for right hip xray showing nondisplaced transverse fracture of the right femoral neck. ED provider spoke to orthopedic surgeon on call who recommended NPO for surgery later today. Plan to start on prn pain medications with PT/OT consult postoperatively. Patient will be admitted to inpatient medical services for continued monitoring and treatment.       Procedure(s) (LRB):  PINNING, HIP, PERCUTANEOUS (Right)      Hospital Course:   Soni Harrisis a 88 year old black female with a PMHx significant for dementia, HTN, CVA, and CVD presenting for right hip pain following a fall. Patient was monitored closely throughout course of hospital stay by hospital medicine team. Orthopedic consulted for nondisplaced transverse fracture of right femoral neck. Patient held NPO for surgery with Dr. Jhaveri on 10/5. Patient started  on prn pain medication. PT/OT consulted for post op evaluation.  Patient had an episode of non redirectable agitation and telepsych consult was placed.  The patient's family worked closely with Case Management to determine the best disposition for the patient.  The family ultimately decided the patient would go home with home health.  Patient was seen by surgeon recommended ASA 81 b.i.d. for 30 days for DVT prophylaxis. Patient to be discharged home with home health and prn pain medication. Wheelchair delivered prior to discharge. Patient and family verbalized understanding of discharge instructions and return precautions.         Goals of Care Treatment Preferences:  Code Status: Full Code      Consults:   Consults (From admission, onward)        Status Ordering Provider     Inpatient consult to Orthopedics  Once        Provider:  Tad Jhaveri MD    Completed KATIE RIOS          No new Assessment & Plan notes have been filed under this hospital service since the last note was generated.  Service: Hospital Medicine    Final Active Diagnoses:    Diagnosis Date Noted POA    PRINCIPAL PROBLEM:  Closed fracture of right hip [S72.001A] 10/05/2022 Yes    Late onset Alzheimer's dementia with behavioral disturbance [G30.1, F02.818] 01/18/2022 Yes    CVD (cardiovascular disease) [I25.10] 04/14/2013 Yes    GERD (gastroesophageal reflux disease) [K21.9]  Yes    Hypertension [I10]  Yes    CVA (cerebral infarction) [I63.9] 04/12/2013 Yes      Problems Resolved During this Admission:       Discharged Condition: fair    Disposition: Home-Health Care [facility]    Follow Up:   Follow-up Information     Fran Gilbert MD Follow up in 3 day(s).    Specialty: Family Medicine  Why: call to schedule after DC. ideally want you to follow up within a week from DC  Contact information:  [address] [facility] [address]  [phone]             Isak Garcia MD Follow up in 2 week(s).    Specialty: Orthopedic  "Surgery  Why: call to schedule post op apt. need apt around 2 week arcelia from sx  Contact information:  [address]  [address]  [facility]  [facility] [address]  [phone]             [facility] Follow up.    Specialty: Home Health Services  Why: will see you on Monday  Contact information:  [place] [address]  [phone]             [facility] Follow up.    Specialty: DME Provider  Why: where your equip is from. hospital bed and tub transfer bench to be delivered to house  Contact information:  [address]  [address]  [facility] [address]  [phone]                       Patient Instructions:      HOSPITAL BED FOR HOME USE     Order Specific Question Answer Comments   Type: Semi-electric    Length of need (1-99 months): 99    Does patient have medical equipment at home? cane, straight    Height: 5' 4" (1.626 m)    Weight: 60.4 kg (133 lb 3.2 oz)    Please check all that apply: Patient requires positioning of the body in ways not feasible in an ordinary bed due to a medical condition which is expected to last at least one month.      WHEELCHAIR FOR HOME USE     Order Specific Question Answer Comments   Hours in W/C per day: 10    Type of Wheelchair: Standard    Size(Width): 18"(STD adult)    Leg Support: STD footrests    Lap Belt: Velcro    Accessories: None    Cushion: Basic    Height: 5' 4" (1.626 m)    Weight: 60.4 kg (133 lb 3.2 oz)    Does patient have medical equipment at home? cane, straight    Length of need (1-99 months): 99    Please check all that apply: Caregiver is capable and willing to operate wheelchair safely.      TRANSFER TUB BENCH FOR HOME USE     Order Specific Question Answer Comments   Type of Transfer Tub Bench: Unpadded    Height: 5' 4" (1.626 m)    Weight: 60.4 kg (133 lb 3.2 oz)    Does patient have medical equipment at home? cane, straight    Length of need (1-99 months): 99    Patient notified - Not covered by insurance " considered a convenience item Yes    Discussed financial responsibility with responsible party Yes      Ambulatory referral/consult to Home Health   Standing Status: Future   Referral Priority: Routine Referral Type: Home Health   Referral Reason: Specialty Services Required   Requested Specialty: Home Health Services   Number of Visits Requested: 1     Diet Cardiac     Notify your health care provider if you experience any of the following:  temperature >100.4     Notify your health care provider if you experience any of the following:  persistent nausea and vomiting or diarrhea     Notify your health care provider if you experience any of the following:  severe uncontrolled pain     Notify your health care provider if you experience any of the following:  redness, tenderness, or signs of infection (pain, swelling, redness, odor or green/yellow discharge around incision site)     Notify your health care provider if you experience any of the following:  difficulty breathing or increased cough     Notify your health care provider if you experience any of the following:  increased confusion or weakness     Notify your health care provider if you experience any of the following:  persistent dizziness, light-headedness, or visual disturbances     Activity as tolerated       Significant Diagnostic Studies: Labs: CMP No results for input(s): NA, K, CL, CO2, GLU, BUN, CREATININE, CALCIUM, PROT, ALBUMIN, BILITOT, ALKPHOS, AST, ALT, ANIONGAP, ESTGFRAFRICA, EGFRNONAA in the last 48 hours. and CBC No results for input(s): WBC, HGB, HCT, PLT in the last 48 hours.    Pending Diagnostic Studies:     None         Medications:  Reconciled Home Medications:      Medication List      START taking these medications    oxyCODONE-acetaminophen  mg per tablet  Commonly known as: PERCOCET  Take 1 tablet by mouth every 8 (eight) hours as needed for Pain.        CHANGE how you take these medications    amLODIPine 5 MG tablet  Commonly  known as: NORVASC  Take 5 mg by mouth once daily.  What changed: Another medication with the same name was removed. Continue taking this medication, and follow the directions you see here.        CONTINUE taking these medications    alendronate 5 MG Tab  Commonly known as: FOSAMAX  Take 5 mg by mouth before breakfast.     amoxicillin-clavulanate 875-125mg 875-125 mg per tablet  Commonly known as: AUGMENTIN  Take 1 tablet by mouth 2 (two) times daily.     aspirin 325 MG tablet  Take 1 tablet (325 mg total) by mouth once daily.     atorvastatin 20 MG tablet  Commonly known as: LIPITOR  Take 20 mg by mouth once daily.     calcium citrate 200 mg (950 mg) tablet  Commonly known as: CALCITRATE  Take 1 tablet by mouth 2 (two) times daily.     DEEP BLUE RELIEF Gel  Generic drug: msm-aloe vera-herbal66-emu oil  Apply topically as needed.     diclofenac sodium 1 % Gel  Commonly known as: VOLTAREN  Apply 2 g topically 4 (four) times daily.     * docusate sodium 100 MG capsule  Commonly known as: COLACE  Take 1 capsule (100 mg total) by mouth 2 (two) times daily as needed for Constipation.     * docusate sodium 100 MG capsule  Commonly known as: COLACE  Take 1 capsule (100 mg total) by mouth once daily.     donepeziL 10 MG tablet  Commonly known as: ARICEPT  Take 10 mg by mouth once daily.     losartan 50 MG tablet  Commonly known as: COZAAR  Take 50 mg by mouth once daily.     memantine 10 MG Tab  Commonly known as: NAMENDA  Take 10 mg by mouth once daily.     metoprolol succinate 50 MG 24 hr tablet  Commonly known as: TOPROL-XL  Take 50 mg by mouth once daily.     metoprolol tartrate 25 MG tablet  Commonly known as: LOPRESSOR  Take 22 mg by mouth once daily.     multivit-min-FA-lycopen-lutein 0.4 mg-300 mcg- 250 mcg Tab  Take by mouth.     omega 3-dha-epa-fish oil 1,000 mg (120 mg-180 mg) Cap  1 capsule     omeprazole 20 MG capsule  Commonly known as: PRILOSEC  Take 20 mg by mouth once daily.     risedronate 5 MG  tablet  Commonly known as: ACTONEL  Take 5 mg by mouth once a week.     WOMEN'S 50+ ADVANCED ORAL  Take 1 tablet by mouth Daily.         * This list has 2 medication(s) that are the same as other medications prescribed for you. Read the directions carefully, and ask your doctor or other care provider to review them with you.                Indwelling Lines/Drains at time of discharge:   Lines/Drains/Airways     None                 Time spent on the discharge of patient: 37 minutes         Tyron Roach PA-C  Department of Hospital Medicine  [facility]

## 2022-10-11 ENCOUNTER — TELEPHONE (OUTPATIENT)
Dept: MEDSURG UNIT | Facility: HOSPITAL | Age: 87
End: 2022-10-11
Payer: MEDICARE

## 2022-10-11 ENCOUNTER — PES CALL (OUTPATIENT)
Dept: ADMINISTRATIVE | Facility: CLINIC | Age: 87
End: 2022-10-11
Payer: MEDICARE

## 2022-10-11 RX ORDER — AMOXICILLIN 250 MG
1 CAPSULE ORAL DAILY
COMMUNITY

## 2022-10-11 RX ORDER — CHOLECALCIFEROL (VITAMIN D3) 25 MCG
5000 TABLET ORAL DAILY
COMMUNITY

## 2022-10-11 RX ORDER — IBUPROFEN 400 MG/1
400 TABLET ORAL EVERY 8 HOURS PRN
COMMUNITY

## 2022-10-11 NOTE — PROGRESS NOTES
C3 nurse spoke with Soni Harris's daughter Florencia Berman for a TCC post hospital discharge follow up call. The patient does not have a scheduled HOSFU appointment with Fran Gilbert MD  within 5-7 days post hospital discharge date 10/8/2022.  But Florencia Berman declined PCP appointment due to concern about the patient's mobility, s/p hip pinnin sx.  NP referral placed after unable to schedule NP appointment.    Message sent to PCP staff requesting they contact patient and schedule follow up appointment.

## 2022-10-13 ENCOUNTER — TELEPHONE (OUTPATIENT)
Dept: ADMINISTRATIVE | Facility: CLINIC | Age: 87
End: 2022-10-13
Payer: MEDICARE

## 2022-10-13 ENCOUNTER — OFFICE VISIT (OUTPATIENT)
Dept: HOME HEALTH SERVICES | Facility: CLINIC | Age: 87
End: 2022-10-13
Payer: MEDICARE

## 2022-10-13 ENCOUNTER — PATIENT MESSAGE (OUTPATIENT)
Dept: ADMINISTRATIVE | Facility: CLINIC | Age: 87
End: 2022-10-13
Payer: MEDICARE

## 2022-10-13 DIAGNOSIS — G30.1 LATE ONSET ALZHEIMER'S DEMENTIA WITH BEHAVIORAL DISTURBANCE: Primary | ICD-10-CM
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
## 2022-10-13 DIAGNOSIS — K59.09 OTHER CONSTIPATION: ICD-10-CM

## 2022-10-13 DIAGNOSIS — F02.818 LATE ONSET ALZHEIMER'S DEMENTIA WITH BEHAVIORAL DISTURBANCE: Primary | ICD-10-CM

## 2022-10-13 DIAGNOSIS — Z86.73 HISTORY OF CVA (CEREBROVASCULAR ACCIDENT): ICD-10-CM

## 2022-10-13 DIAGNOSIS — S72.001A HIP FX, RIGHT, CLOSED, INITIAL ENCOUNTER: ICD-10-CM

## 2022-10-13 DIAGNOSIS — S72.001A CLOSED FRACTURE OF RIGHT HIP, INITIAL ENCOUNTER: ICD-10-CM

## 2022-10-13 PROCEDURE — 1160F RVW MEDS BY RX/DR IN RCRD: CPT | Mod: CPTII,S$GLB,, | Performed by: NURSE PRACTITIONER

## 2022-10-13 PROCEDURE — 1100F PR PT FALLS ASSESS DOC 2+ FALLS/FALL W/INJURY/YR: ICD-10-PCS | Mod: CPTII,S$GLB,, | Performed by: NURSE PRACTITIONER

## 2022-10-13 PROCEDURE — 1159F PR MEDICATION LIST DOCUMENTED IN MEDICAL RECORD: ICD-10-PCS | Mod: CPTII,S$GLB,, | Performed by: NURSE PRACTITIONER

## 2022-10-13 PROCEDURE — 1100F PTFALLS ASSESS-DOCD GE2>/YR: CPT | Mod: CPTII,S$GLB,, | Performed by: NURSE PRACTITIONER

## 2022-10-13 PROCEDURE — 99496 TRANSITIONAL CARE MANAGE SERVICE 7 DAY DISCHARGE: ICD-10-PCS | Mod: S$GLB,,, | Performed by: NURSE PRACTITIONER

## 2022-10-13 PROCEDURE — 1160F PR REVIEW ALL MEDS BY PRESCRIBER/CLIN PHARMACIST DOCUMENTED: ICD-10-PCS | Mod: CPTII,S$GLB,, | Performed by: NURSE PRACTITIONER

## 2022-10-13 PROCEDURE — 99496 TRANSJ CARE MGMT HIGH F2F 7D: CPT | Mod: S$GLB,,, | Performed by: NURSE PRACTITIONER

## 2022-10-13 PROCEDURE — 1126F PR PAIN SEVERITY QUANTIFIED, NO PAIN PRESENT: ICD-10-PCS | Mod: CPTII,S$GLB,, | Performed by: NURSE PRACTITIONER

## 2022-10-13 PROCEDURE — 3288F FALL RISK ASSESSMENT DOCD: CPT | Mod: CPTII,S$GLB,, | Performed by: NURSE PRACTITIONER

## 2022-10-13 PROCEDURE — 1126F AMNT PAIN NOTED NONE PRSNT: CPT | Mod: CPTII,S$GLB,, | Performed by: NURSE PRACTITIONER

## 2022-10-13 PROCEDURE — 1159F MED LIST DOCD IN RCRD: CPT | Mod: CPTII,S$GLB,, | Performed by: NURSE PRACTITIONER

## 2022-10-13 PROCEDURE — 3288F PR FALLS RISK ASSESSMENT DOCUMENTED: ICD-10-PCS | Mod: CPTII,S$GLB,, | Performed by: NURSE PRACTITIONER

## 2022-10-13 NOTE — TELEPHONE ENCOUNTER
1st Attempt made to reach patient for Post discharge tracker call. Left voicemail please select option 5 on the phone again if assistance is still needed, nurse will make another attempt to contact patient.

## 2022-10-14 VITALS
HEART RATE: 92 BPM | DIASTOLIC BLOOD PRESSURE: 66 MMHG | RESPIRATION RATE: 16 BRPM | OXYGEN SATURATION: 95 % | TEMPERATURE: 98 F | SYSTOLIC BLOOD PRESSURE: 130 MMHG

## 2022-10-14 NOTE — PROGRESS NOTES
[facility]  Transition of Care Home Visit    Visit Date: 10/13/2022  Encounter Provider: Sandie Aguilar   PCP:  Fran Gilbert MD    PRESENTING HISTORY      Patient ID: Soni Harris is a 88 y.o. female.    Consult Requested By:  Dr. Fran Gilbert  Reason for Consult:  Hospital Follow Up.    Soni is being seen at home due to being seen at home due to physical debility that presents a taxing effort to leave the home, to mitigate high risk of hospital readmission and/or due to the limited availability of reliable or safe options for transportation to the point of access to the provider. Prior to treatment on this visit the chart was reviewed and patient verbal consent was obtained.      Chief Complaint: Transitional Care        History of Present Illness: Ms. Soni Harris is a 88 y.o. female who was recently admitted to the hospital.    Admit: 10/5/22   Discharge: 10/8/22  HPI:   Soni Harris is a 88 year old black female with a PMHx significant for dementia without behavioral disturbances, HTN, CVA, and CVD presenting for evaluation of right hip pain following a fall at home last night. Patient unable to provide appropriate history due to baseline dementia. History obtained for daughter at bedside. Daughter reports patient lives at home with her. She states patient fell around 9 o clock last night while attempting to get out of bed. Daughter states she heard patient calling for help immediately after the fall. Daughter states patient has habit of getting out of bed multiple times throughout night. Patient reports her right hip pain as a 5/10 without radiation. ED workup significant for right hip xray showing nondisplaced transverse fracture of the right femoral neck. ED provider spoke to orthopedic surgeon on call who recommended NPO for surgery later today. Plan to start on prn pain medications with PT/OT consult postoperatively. Patient will be admitted to inpatient medical services  for continued monitoring and treatment.         Procedure(s) (LRB):  PINNING, HIP, PERCUTANEOUS (Right)       Hospital Course:   Soni Franco a 88 year old black female with a PMHx significant for dementia, HTN, CVA, and CVD presenting for right hip pain following a fall. Patient was monitored closely throughout course of hospital stay by hospital medicine team. Orthopedic consulted for nondisplaced transverse fracture of right femoral neck. Patient held NPO for surgery with Dr. Jhaveri on 10/5. Patient started on prn pain medication. PT/OT consulted for post op evaluation.  Patient had an episode of non redirectable agitation and telepsych consult was placed.  The patient's family worked closely with Case Management to determine the best disposition for the patient.  The family ultimately decided the patient would go home with home health.  Patient was seen by surgeon recommended ASA 81 b.i.d. for 30 days for DVT prophylaxis. Patient to be discharged home with home health and prn pain medication. Wheelchair delivered prior to discharge. Patient and family verbalized understanding of discharge instructions and return precautions.           Goals of Care Treatment Preferences:  Code Status: Full Code  ___________________________________________________________________    Today:    HPI:      Soni is being seen and examined at home today for transitional care visit in the home environment post-discharge from inpatient hospitalization encounter described above. Patient presents at baseline state of health as reported by patient and caregiver.     Soni is found at home today sitting up in wheelchair, somnolent, confused and occasionally complaining of abdominal pain.Her daughter Florencia is present and reports patient was just started on Linzess for constipation. Last BM 2 days ago. She is also taking senakot and pepto bismol per daughter. Patient is only taking ibuprofen for pain. Participating with  PT.  "Baseline confusion is stable related to dementia.     VSS. Denies fever, chest pain, shortness of breath, nausea, vomiting, diarrhea. Denies any acute issues, concerns or complaints to address on today's visit. Reports taking all medications as prescribed. No other needs identified at this time.              Review of Systems   Unable to perform ROS: Dementia   Daughter reports patient with constipation    Assessments:  Environmental: lives with daughter and son in law in single story home that is clean with adequate lighting and temperature  Functional Status: dependent, chair bound since fall/right hip fx  Safety: fall risk, dementia  Nutritional: had adequate access, "fair" appetite reported  Home Health/DME/Supplies: [facility] DME: wheelchair, walker, BSC, shower chair    PAST HISTORY:     Past Medical History:   Diagnosis Date    Arthritis     GERD (gastroesophageal reflux disease)     Glaucoma (increased eye pressure)     Hypertension     Stroke        Past Surgical History:   Procedure Laterality Date    EYE SURGERY      HYSTERECTOMY      PERCUTANEOUS PINNING OF HIP Right 10/5/2022    Procedure: PINNING, HIP, PERCUTANEOUS;  Surgeon: Tad Jhaveri MD;  Location: [facility];  Service: Orthopedics;  Laterality: Right;       Family History   Family history unknown: Yes       Social History     Socioeconomic History    Marital status:    Tobacco Use    Smoking status: Former     Types: Cigarettes     Quit date: 2003     Years since quittin.1    Smokeless tobacco: Never   Substance and Sexual Activity    Alcohol use: No    Drug use: No       MEDICATIONS & ALLERGIES:     Current Outpatient Medications on File Prior to Visit   Medication Sig Dispense Refill    alendronate (FOSAMAX) 5 MG Tab Take 5 mg by mouth before breakfast.      amLODIPine (NORVASC) 5 MG tablet Take 5 mg by mouth once daily.      aspirin 325 MG tablet Take 1 tablet (325 mg total) by mouth once daily. 30 tablet 11    " atorvastatin (LIPITOR) 20 MG tablet Take 20 mg by mouth once daily.      calcium citrate (CALCITRATE) 200 mg (950 mg) tablet Take 1 tablet by mouth 2 (two) times daily.      diclofenac sodium (VOLTAREN) 1 % Gel Apply 2 g topically 4 (four) times daily. 100 g 2    donepeziL (ARICEPT) 10 MG tablet Take 10 mg by mouth once daily.      ibuprofen (ADVIL,MOTRIN) 400 MG tablet Take 400 mg by mouth every 8 (eight) hours as needed for Other.      losartan (COZAAR) 50 MG tablet Take 50 mg by mouth once daily.      memantine (NAMENDA) 10 MG Tab Take 10 mg by mouth once daily.      metoprolol succinate (TOPROL-XL) 50 MG 24 hr tablet Take 50 mg by mouth once daily.      msm-aloe vera-herbal66-emu oil (DEEP BLUE RELIEF) Gel Apply topically as needed.      multivit-min-FA-lycopen-lutein 0.4-300-250 mg-mcg-mcg Tab Take by mouth.      mv-minerals/FA/omega 3,6,9 #3 (WOMEN'S 50+ ADVANCED ORAL) Take 1 tablet by mouth Daily.      omega 3-dha-epa-fish oil 1,000 mg (120 mg-180 mg) Cap 1 capsule      omeprazole (PRILOSEC) 20 MG capsule Take 20 mg by mouth once daily.      risedronate (ACTONEL) 5 MG tablet Take 5 mg by mouth once a week.      senna-docusate 8.6-50 mg (PERICOLACE) 8.6-50 mg per tablet Take 1 tablet by mouth once daily.      vitamin D (VITAMIN D3) 1000 units Tab Take 5,000 Units by mouth once daily.      amoxicillin-clavulanate 875-125mg (AUGMENTIN) 875-125 mg per tablet Take 1 tablet by mouth 2 (two) times daily. (Patient not taking: Reported on 10/14/2022) 14 tablet 0    docusate sodium (COLACE) 100 MG capsule Take 1 capsule (100 mg total) by mouth 2 (two) times daily as needed for Constipation. (Patient not taking: Reported on 10/11/2022) 30 capsule 0    docusate sodium (COLACE) 100 MG capsule Take 1 capsule (100 mg total) by mouth once daily. (Patient not taking: Reported on 10/11/2022) 30 capsule 0    metoprolol tartrate (LOPRESSOR) 25 MG tablet Take 22 mg by mouth once daily.       No current facility-administered  medications on file prior to visit.        Review of patient's allergies indicates:  No Known Allergies    OBJECTIVE:     Vital Signs:  Vitals:    10/13/22 1120   BP: 130/66   Pulse: 92   Resp: 16   Temp: 98.2 °F (36.8 °C)     There is no height or weight on file to calculate BMI.     Physical Exam:  Constitutional: She appears thin, frail, occasionally grimacing and reporting abdomina pain, otherwise, somnolent, confused.  She is not diaphoretic. No distress.   HENT:   Head: Normocephalic and atraumatic.   Eyes: EOM are normal.   Neck: Neck supple.   Normal range of motion.  Cardiovascular:  Normal rate, regular rhythm and normal heart sounds.     Exam reveals no gallop and no friction rub.       No murmur heard.  Pulmonary/Chest: Breath sounds normal. No respiratory distress. She has no wheezes. She has no rhonchi. She has no rales.   Gastrointestinal: hyperactive bowel sounds. Non-distended, soft abdomen. BS + x 4 quadrants.  Musculoskeletal:         General: Normal range of motion.      Cervical back: Normal range of motion and neck supple.      Comments: Right lateral hip with surgical dressing in place.    Neurological: She is somnolent. Oriented to person only.   Skin: Skin is warm and dry.   Psychiatric: She has a normal mood and affect. Her behavior is normal. Judgment and thought content normal.             Laboratory  Lab Results   Component Value Date    WBC 7.92 10/08/2022    HGB 8.4 (L) 10/08/2022    HCT 24.9 (L) 10/08/2022    MCV 91 10/08/2022     (L) 10/08/2022     Lab Results   Component Value Date    INR 1.01 04/12/2013     No results found for: HGBA1C  No results for input(s): POCTGLUCOSE in the last 72 hours.    Diagnostic Results:  reviewed    TRANSITION OF CARE:     [facility] Contact Note: 10/10/22    Family and/or Caretaker present at visit?  Yes.  Diagnostic tests reviewed/disposition: No diagnosic tests pending after this hospitalization.  Disease/illness education: s/p fall,  right hip pinning, dementia  Home health/community services discussion/referrals: Patient has home health established at [facility] .   Establishment or re-establishment of referral orders for community resources: No other necessary community resources.   Discussion with other health care providers: No discussion with other health care providers necessary.     Transition of Care Visit:  I have reviewed and updated the history and problem list.  I have reconciled the medication list.  I have discussed the hospitalization and current medical issues, prognosis and plans with the patient/family.  I  spent more than 50% of time discussing the care with the patient/family.  Total Face-to-Face Encounter: 60 minutes.    Medications Reconciliation:   I have reconciled the patient's home medications and discharge medications with the patient/family. I have updated all changes.  Refer to After-Visit Medication List.    ASSESSMENT & PLAN:     HIGH RISK CONDITION(S):      1. Late onset Alzheimer's dementia with behavioral disturbance  Assessment & Plan:  Chronic.  ADL dependent, recent fall with fracture.        2. Hip fx, right, closed, initial encounter  -     Ambulatory referral/consult to [facility]    3. History of CVA (cerebrovascular accident)  Overview:  2019 CVA    Assessment & Plan:  Stable.  No deficits.        4. Closed fracture of right hip, initial encounter  Overview:  S/p right hip pinning 10/5/22.    Assessment & Plan:  Stable.  Working with  PT.  Taking ibuprofen prn.      5. Fall, initial encounter  Assessment & Plan:  10/5/22 fall at home, sustained right hip fracture.  Fall precautions and safety advised.       6. Other constipation  Assessment & Plan:  Continue linzess.  Increase fluid and fiber intake.           Were controlled substances prescribed?  No        Scheduled Follow-up :  Future Appointments   Date Time Provider Department Center   11/15/2022 11:15 AM Tad  MD Shakila [facility]       After Visit Medication List :     Medication List            Accurate as of October 13, 2022 11:59 PM. If you have any questions, ask your nurse or doctor.                CONTINUE taking these medications      alendronate 5 MG Tab  Commonly known as: FOSAMAX     amLODIPine 5 MG tablet  Commonly known as: NORVASC     amoxicillin-clavulanate 875-125mg 875-125 mg per tablet  Commonly known as: AUGMENTIN  Take 1 tablet by mouth 2 (two) times daily.     aspirin 325 MG tablet  Take 1 tablet (325 mg total) by mouth once daily.     atorvastatin 20 MG tablet  Commonly known as: LIPITOR     calcium citrate 200 mg (950 mg) tablet  Commonly known as: CALCITRATE     DEEP BLUE RELIEF Gel  Generic drug: msm-aloe vera-herbal66-emu oil     diclofenac sodium 1 % Gel  Commonly known as: VOLTAREN  Apply 2 g topically 4 (four) times daily.     * docusate sodium 100 MG capsule  Commonly known as: COLACE  Take 1 capsule (100 mg total) by mouth 2 (two) times daily as needed for Constipation.     * docusate sodium 100 MG capsule  Commonly known as: COLACE  Take 1 capsule (100 mg total) by mouth once daily.     donepeziL 10 MG tablet  Commonly known as: ARICEPT     ibuprofen 400 MG tablet  Commonly known as: ADVIL,MOTRIN     losartan 50 MG tablet  Commonly known as: COZAAR     memantine 10 MG Tab  Commonly known as: NAMENDA     metoprolol succinate 50 MG 24 hr tablet  Commonly known as: TOPROL-XL     metoprolol tartrate 25 MG tablet  Commonly known as: LOPRESSOR     multivit-min-FA-lycopen-lutein 0.4 mg-300 mcg- 250 mcg Tab     omega 3-dha-epa-fish oil 1,000 mg (120 mg-180 mg) Cap     omeprazole 20 MG capsule  Commonly known as: PRILOSEC     oxyCODONE-acetaminophen  mg per tablet  Commonly known as: PERCOCET  Take 1 tablet by mouth every 8 (eight) hours as needed for Pain.     risedronate 5 MG tablet  Commonly known as: ACTONEL     senna-docusate 8.6-50 mg 8.6-50 mg per tablet  Commonly known  as: PERICOLACE     vitamin D 1000 units Tab  Commonly known as: VITAMIN D3     WOMEN'S 50+ ADVANCED ORAL           * This list has 2 medication(s) that are the same as other medications prescribed for you. Read the directions carefully, and ask your doctor or other care provider to review them with you.                  Signature:  Sandie Aguilar NP

## 2022-10-18 ENCOUNTER — HOSPITAL ENCOUNTER (OUTPATIENT)
Dept: RADIOLOGY | Facility: HOSPITAL | Age: 87
Discharge: HOME | End: 2022-10-18
Attending: ORTHOPAEDIC SURGERY
Payer: MEDICARE

## 2022-10-18 ENCOUNTER — OFFICE VISIT (OUTPATIENT)
Dept: ORTHOPEDICS | Facility: CLINIC | Age: 87
End: 2022-10-18
Payer: MEDICARE

## 2022-10-18 VITALS — RESPIRATION RATE: 16 BRPM | WEIGHT: 133 LBS | HEIGHT: 64 IN | BODY MASS INDEX: 22.71 KG/M2

## 2022-10-18 DIAGNOSIS — S72.001D CLOSED FRACTURE OF RIGHT HIP WITH ROUTINE HEALING, SUBSEQUENT ENCOUNTER: ICD-10-CM

## 2022-10-18 DIAGNOSIS — S72.001D CLOSED FRACTURE OF NECK OF RIGHT FEMUR WITH ROUTINE HEALING, SUBSEQUENT ENCOUNTER: Primary | ICD-10-CM

## 2022-10-18 PROCEDURE — 99024 PR POST-OP FOLLOW-UP VISIT: ICD-10-PCS | Mod: S$GLB,,, | Performed by: ORTHOPAEDIC SURGERY

## 2022-10-18 PROCEDURE — 99024 POSTOP FOLLOW-UP VISIT: CPT | Mod: S$GLB,,, | Performed by: ORTHOPAEDIC SURGERY

## 2022-10-18 PROCEDURE — 99999 PR PBB SHADOW E&M-EST. PATIENT-LVL II: CPT | Mod: PBBFAC,,, | Performed by: ORTHOPAEDIC SURGERY

## 2022-10-18 PROCEDURE — 73502 X-RAY EXAM HIP UNI 2-3 VIEWS: CPT | Mod: TC,FY,RT

## 2022-10-18 PROCEDURE — 3288F PR FALLS RISK ASSESSMENT DOCUMENTED: ICD-10-PCS | Mod: CPTII,S$GLB,, | Performed by: ORTHOPAEDIC SURGERY

## 2022-10-18 PROCEDURE — 1126F AMNT PAIN NOTED NONE PRSNT: CPT | Mod: CPTII,S$GLB,, | Performed by: ORTHOPAEDIC SURGERY

## 2022-10-18 PROCEDURE — 73502 XR HIP WITH PELVIS WHEN PERFORMED, 2 OR 3  VIEWS RIGHT: ICD-10-PCS | Mod: 26,RT,, | Performed by: RADIOLOGY

## 2022-10-18 PROCEDURE — 99999 PR PBB SHADOW E&M-EST. PATIENT-LVL II: ICD-10-PCS | Mod: PBBFAC,,, | Performed by: ORTHOPAEDIC SURGERY

## 2022-10-18 PROCEDURE — 73502 X-RAY EXAM HIP UNI 2-3 VIEWS: CPT | Mod: 26,RT,, | Performed by: RADIOLOGY

## 2022-10-18 PROCEDURE — 1126F PR PAIN SEVERITY QUANTIFIED, NO PAIN PRESENT: ICD-10-PCS | Mod: CPTII,S$GLB,, | Performed by: ORTHOPAEDIC SURGERY

## 2022-10-18 PROCEDURE — 1101F PT FALLS ASSESS-DOCD LE1/YR: CPT | Mod: CPTII,S$GLB,, | Performed by: ORTHOPAEDIC SURGERY

## 2022-10-18 PROCEDURE — 3288F FALL RISK ASSESSMENT DOCD: CPT | Mod: CPTII,S$GLB,, | Performed by: ORTHOPAEDIC SURGERY

## 2022-10-18 PROCEDURE — 1101F PR PT FALLS ASSESS DOC 0-1 FALLS W/OUT INJ PAST YR: ICD-10-PCS | Mod: CPTII,S$GLB,, | Performed by: ORTHOPAEDIC SURGERY

## 2022-10-18 NOTE — LETTER
October 18, 2022      [facility]  [address] [place] [address] [address]  [facility] [address]  Phone: [phone]       Patient: Soni Harris   YOB: 1934  Date of Visit: 10/18/2022    To Whom It May Concern:    Alexandru Harris  was at [facility] on 10/18/2022. The patient may begin to weight bear as tolerated. If you have any questions or concerns, or if I can be of further assistance, please do not hesitate to contact me.    Sincerely,  MD Karon Flanagan LPN

## 2022-10-18 NOTE — PROGRESS NOTES
Post-op Note    HPI    Soni Harris is here 2 weeks s/p the following procedure:     10/5/2022  Percutaneous pinning, right femoral neck fracture (CPT #51527).    Overall doing well. Pain controlled on current regimen. She is not enrolled in Physical Therapy, family states they wanted to wait until she could bear weight. Denies any chest pain or shortness of breathe. Denies any drainage from the incision. Denies any fevers, chills or paresthesias.     DVT Prophylaxis: ASA daily      Physical Exam:     Patient is alert and oriented no acute distress.   Assistive Device: wheelchair    Right hip: staples removed, Incision(s) are well healed.  There is no evidence of dehiscence.  There is no induration erythema or signs of infection.  Appropriate soft tissue swelling.  Compartments are soft and compressible.  Warm well-perfused extremity.  Painless passive range of motion right hip when seated.    Imaging:  Right hip xrays:  Three-view right hip x-rays show screw fixation of a femoral neck fracture.  There is overall good alignment.  There is no evidence of hardware failure or backing out of screws.  No evidence of collapse.    Assessment    Soni Harris is 2 weeks Post-op     Plan:    Overall doing as expected.  We discussed expectations of surgery and postoperative course.       DVT prophylaxis:  Continue physical therapy (weight bearing status:  Okay to advance to weight-bearing as tolerated)  Again discussed with family with 30% risk of need for revision to hip replacement or partial hip replacement    Follow-up: 4 weeks   X-rays next visit: right hip
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
## 2022-10-23 PROBLEM — K59.09 OTHER CONSTIPATION: Status: ACTIVE | Noted: 2022-10-23

## 2022-10-26 ENCOUNTER — DOCUMENT SCAN (OUTPATIENT)
Dept: HOME HEALTH SERVICES | Facility: HOSPITAL | Age: 87
End: 2022-10-26
Payer: MEDICARE

## 2022-10-26 ENCOUNTER — DOCUMENT SCAN (OUTPATIENT)
Dept: HOME HEALTH SERVICES | Facility: HOSPITAL | Age: 87
End: 2022-10-26

## 2022-10-27 ENCOUNTER — DOCUMENT SCAN (OUTPATIENT)
Dept: HOME HEALTH SERVICES | Facility: HOSPITAL | Age: 87
End: 2022-10-27
Payer: MEDICARE

## 2022-11-09 DIAGNOSIS — S72.001D CLOSED FRACTURE OF NECK OF RIGHT FEMUR WITH ROUTINE HEALING, SUBSEQUENT ENCOUNTER: Primary | ICD-10-CM

## 2022-12-02 ENCOUNTER — DOCUMENT SCAN (OUTPATIENT)
Dept: HOME HEALTH SERVICES | Facility: HOSPITAL | Age: 87
End: 2022-12-02
Payer: MEDICARE

## 2022-12-07 ENCOUNTER — EXTERNAL HOME HEALTH (OUTPATIENT)
Dept: HOME HEALTH SERVICES | Facility: HOSPITAL | Age: 87
End: 2022-12-07
Payer: MEDICARE

## 2022-12-15 ENCOUNTER — DOCUMENT SCAN (OUTPATIENT)
Dept: HOME HEALTH SERVICES | Facility: HOSPITAL | Age: 87
End: 2022-12-15
Payer: MEDICARE

## 2022-12-17 ENCOUNTER — DOCUMENT SCAN (OUTPATIENT)
Dept: HOME HEALTH SERVICES | Facility: HOSPITAL | Age: 87
End: 2022-12-17
Payer: MEDICARE

## 2023-04-21 NOTE — TELEPHONE ENCOUNTER
Spoke to  patient daughter who is stating she will not be able to make it to the appointment on 10/18/22 for 1030 due to the xray appointment being at 0945 and her mothers medical condition. She is requesting the appointment be pushed back later so that she has time to get her mother to both appointments in a timely manner. Her mother is transported in a wheelchair which also takes time. Message routed to provider. Ms. Berman also encouraged to reach out to Dr. Jhaveri office, she verbalized understanding.   no chills/no decreased eating/drinking/no dizziness/no fever/no nausea/no tingling/no vomiting/no weakness

## 2023-06-29 NOTE — TRANSFER OF CARE
"Anesthesia Transfer of Care Note    Patient: Soni Harris    Procedure(s) Performed: Procedure(s) (LRB):  PINNING, HIP, PERCUTANEOUS (SYNTHES 6.5 Cannulated screws) -- fracture table -- C arm from opposite side (Right)    Patient location: PACU    Anesthesia Type: general    Transport from OR: Transported from OR on 6-10 L/min O2 by face mask with adequate spontaneous ventilation    Post pain: adequate analgesia    Post assessment: no apparent anesthetic complications    Post vital signs: stable    Level of consciousness: sedated (as before surgery)    Nausea/Vomiting: no nausea/vomiting    Complications: none    Transfer of care protocol was followed      Last vitals:   Visit Vitals  BP (!) 194/90 (BP Location: Left arm, Patient Position: Lying)   Pulse 70   Temp 36.2 °C (97.1 °F) (Skin)   Resp (!) 22   Ht 5' 4" (1.626 m)   Wt 60.4 kg (133 lb 3.2 oz)   SpO2 98%   BMI 22.86 kg/m²     "
DC instructions

## 2023-10-04 NOTE — PT/OT/SLP PROGRESS
Anesthesia Post-op Note    Patient: Alicia Garcia  Procedure(s) Performed: IMPLANTED PULSE GENERATOR REVISION AND IMPLANTED PULSE GENERATOR BATTERY EXCHANGE  Anesthesia type: MAC    Vitals Value Taken Time   Temp 36.3 °C (97.3 °F) 10/04/23 1355   Pulse 104 10/04/23 1355   Resp 10 10/04/23 1355   SpO2 97 % 10/04/23 1355   /76 10/04/23 1355         Patient Location: PACU Phase 1  Post-op Vital Signs:stable  Level of Consciousness: awake and alert  Respiratory Status: spontaneous ventilation  Cardiovascular stable  Hydration: euvolemic  Pain Management: adequately controlled  Handoff: Handoff to receiving clinician was performed and questions were answered  Vomiting: none  Nausea: None  Airway Patency:patent  Post-op Assessment: no complications and patient tolerated procedure well      No notable events documented.   Physical Therapy Treatment    Patient Name:  Soni Harris   MRN:  [id number]    Recommendations:     Discharge Recommendations:  home health PT, nursing facility, skilled   Discharge Equipment Recommendations: wheelchair   Barriers to discharge: Decreased caregiver support    Assessment:     Soni Harris is a 88 y.o. female admitted with a medical diagnosis of Closed fracture of right hip.  She presents with the following impairments/functional limitations:  weakness, impaired endurance, impaired functional mobility, impaired balance, impaired cognition, decreased lower extremity function, decreased safety awareness, pain, decreased ROM, impaired cardiopulmonary response to activity, orthopedic precautions .    Pt seen supine in bed, alert and agreeable to PT. Pt able to complete exercises with AAROME to bilateral LE's. Pt requiring max assist to sit EOB with extra time for balance. Max assist x2 to stand and pivot transfer to bed with TTWB RLE. Pt tearful during transfers due to pain. Daughter at bedside calming pt.    Rehab Prognosis: Fair; patient would benefit from acute skilled PT services to address these deficits and reach maximum level of function.    Recent Surgery: Procedure(s) (LRB):  PINNING, HIP, PERCUTANEOUS (Right) 2 Days Post-Op    Plan:     During this hospitalization, patient to be seen daily (1-2x day) to address the identified rehab impairments via therapeutic activities, therapeutic exercises, wheelchair management/training and progress toward the following goals:    Plan of Care Expires:  10/30/22    Subjective   Pt alert and laughing during exercises and while seated EOB  Tearful with bed to chair transfers  Chief Complaint: pain RH with transfers  Patient/Family Comments/goals: none stated  Pain/Comfort:         Objective:     Communicated with nurse Perez prior to session.  Patient found HOB elevated with PureWick upon PT entry to room.     General Precautions: Standard, fall    Orthopedic Precautions:RLE toe touch weight bearing   Braces:    Respiratory Status: Room air     Functional Mobility:  Bed Mobility:     Scooting: maximal assistance  Supine to Sit: maximal assistance  Transfers:     Sit to Stand:  maximal assistance with no AD  Bed to Chair: maximal assistance and of 2 persons with  no AD  using  Squat Pivot  TTWB RLE      AM-PAC 6 CLICK MOBILITY          Therapeutic Activities and Exercises:   Patient was educated on the importance of OOB activity and functional mobility to negate negative effects of prolonged bed rest during hospitalization, safe transfers and ambulation, and D/C planning   Thera ex with EMRE LE's. Increased AROME LLE and guarded movements RLE due to pain  OOB chair max assist x2    Patient left up in chair with all lines intact, call button in reach, chair alarm on, nurse Chris notified, and daughter present..    GOALS:   Multidisciplinary Problems       Physical Therapy Goals          Problem: Physical Therapy    Goal Priority Disciplines Outcome Goal Variances Interventions   Physical Therapy Goal     PT, PT/OT Ongoing, Progressing     Description: Goals to be met by: 10-     Patient will increase functional independence with mobility by performin. Supine to sit with Maximum Assistance  2. Sit to stand transfer with Maximum Assistance  3. Bed to chair transfer with Maximum Assistance using Rolling Walker  4. Sitting at edge of bed x20 minutes with Maximum Assistance  5. Lower extremity exercise program x20 reps     RLE TTWB only                           Time Tracking:     PT Received On: 10/07/22  PT Start Time: 1351     PT Stop Time: 1430  PT Total Time (min): 39 min     Billable Minutes: Therapeutic Activity 25 and Therapeutic Exercise 14    Treatment Type: Evaluation  PT/PTA: PT     PTA Visit Number: 0     10/07/2022

## 2024-03-19 NOTE — HIM RECORD RETIREMENT NOTE
alf of Incomplete Medical Record    3/19/24    Patient Name: Soni Harris  Contact Serial # (CSN): [id number]  Patient Medical Record # (MRN): [id number]  Date of Service: Patient Outreach on 10/10/2022  Physician Name: Fran Gilbert     This record has been reviewed and is being retired as incomplete by the approval of the  Medical Staff Operating Committee (MSOC)     On 5/3/2023., due to:  Unavailability of Provider     Missing Information/Comments:  []    Discharge Summary   []    DC Note/Short Stay Summary   []    ED Provider Note   []    Delivery Note   []    History & Physical   []   Operative Note   []     Procedure Note   []     Physician Order   [x]     Verbal Order   []       Other, specify:

## 2025-04-22 ENCOUNTER — TELEPHONE (OUTPATIENT)
Dept: RADIOLOGY | Facility: HOSPITAL | Age: OVER 89
End: 2025-04-22

## 2025-04-22 ENCOUNTER — HOSPITAL ENCOUNTER (INPATIENT)
Facility: HOSPITAL | Age: OVER 89
LOS: 2 days | Discharge: HOME HEALTH | DRG: 178 | End: 2025-04-24
Attending: STUDENT IN AN ORGANIZED HEALTH CARE EDUCATION/TRAINING PROGRAM | Admitting: STUDENT IN AN ORGANIZED HEALTH CARE EDUCATION/TRAINING PROGRAM
Payer: MEDICARE

## 2025-04-22 DIAGNOSIS — R06.82 TACHYPNEA: ICD-10-CM

## 2025-04-22 DIAGNOSIS — R31.9 URINARY TRACT INFECTION WITH HEMATURIA, SITE UNSPECIFIED: ICD-10-CM

## 2025-04-22 DIAGNOSIS — N39.0 URINARY TRACT INFECTION WITH HEMATURIA, SITE UNSPECIFIED: ICD-10-CM

## 2025-04-22 DIAGNOSIS — N39.0 UTI (URINARY TRACT INFECTION): ICD-10-CM

## 2025-04-22 DIAGNOSIS — Z13.6 SCREENING FOR CARDIOVASCULAR CONDITION: ICD-10-CM

## 2025-04-22 DIAGNOSIS — U07.1 COVID-19: Primary | ICD-10-CM

## 2025-04-22 DIAGNOSIS — R79.89 ELEVATED TROPONIN: ICD-10-CM

## 2025-04-22 DIAGNOSIS — R79.89 ELEVATED BRAIN NATRIURETIC PEPTIDE (BNP) LEVEL: ICD-10-CM

## 2025-04-22 DIAGNOSIS — R05.9 COUGH: ICD-10-CM

## 2025-04-22 DIAGNOSIS — R00.1 BRADYCARDIA BY ELECTROCARDIOGRAM: ICD-10-CM

## 2025-04-22 PROBLEM — S00.93XA TRAUMATIC HEMATOMA OF HEAD: Status: RESOLVED | Noted: 2025-04-22 | Resolved: 2025-04-22

## 2025-04-22 PROBLEM — N17.9 AKI (ACUTE KIDNEY INJURY): Status: ACTIVE | Noted: 2025-04-22

## 2025-04-22 PROBLEM — S00.83XA HEMATOMA OF FACE: Status: ACTIVE | Noted: 2025-04-22

## 2025-04-22 PROBLEM — S00.93XA TRAUMATIC HEMATOMA OF HEAD: Status: ACTIVE | Noted: 2025-04-22

## 2025-04-22 LAB
25(OH)D3+25(OH)D2 SERPL-MCNC: 33 NG/ML (ref 30–96)
ABSOLUTE EOSINOPHIL (SMH): 0 K/UL
ABSOLUTE MONOCYTE (SMH): 0.58 K/UL (ref 0.3–1)
ABSOLUTE NEUTROPHIL COUNT (SMH): 2.7 K/UL (ref 1.8–7.7)
ALBUMIN SERPL-MCNC: 3.5 G/DL (ref 3.5–5.2)
ALLENS TEST: NORMAL
ALP SERPL-CCNC: 85 UNIT/L (ref 40–150)
ALT SERPL-CCNC: 11 UNIT/L (ref 10–44)
ANION GAP (SMH): 10 MMOL/L (ref 8–16)
APTT PPP: 31.9 SECONDS (ref 21–32)
AST SERPL-CCNC: 31 UNIT/L (ref 11–45)
BACTERIA #/AREA URNS AUTO: ABNORMAL /HPF
BASOPHILS # BLD AUTO: 0.04 K/UL
BASOPHILS NFR BLD AUTO: 0.9 %
BILIRUB SERPL-MCNC: 0.5 MG/DL (ref 0.1–1)
BILIRUB UR QL STRIP.AUTO: NEGATIVE
BUN SERPL-MCNC: 15 MG/DL (ref 10–30)
CALCIUM SERPL-MCNC: 8.8 MG/DL (ref 8.7–10.5)
CHLORIDE SERPL-SCNC: 106 MMOL/L (ref 95–110)
CK SERPL-CCNC: 50 U/L (ref 20–180)
CLARITY UR: ABNORMAL
CO2 SERPL-SCNC: 22 MMOL/L (ref 23–29)
COLOR UR AUTO: ABNORMAL
CREAT SERPL-MCNC: 1.2 MG/DL (ref 0.5–1.4)
D DIMER PPP IA.FEU-MCNC: 2.17 MG/L FEU
DELSYS: NORMAL
ERYTHROCYTE [DISTWIDTH] IN BLOOD BY AUTOMATED COUNT: 12.8 % (ref 11.5–14.5)
ERYTHROCYTE [SEDIMENTATION RATE] IN BLOOD: 10 MM/HR (ref 0–20)
FERRITIN SERPL-MCNC: 1709.1 NG/ML (ref 20–300)
GFR SERPLBLD CREATININE-BSD FMLA CKD-EPI: 43 ML/MIN/1.73/M2
GLUCOSE SERPL-MCNC: 149 MG/DL (ref 70–110)
GLUCOSE UR QL STRIP: ABNORMAL
HCT VFR BLD AUTO: 38.5 % (ref 37–48.5)
HGB BLD-MCNC: 12.3 GM/DL (ref 12–16)
HGB UR QL STRIP: ABNORMAL
IMM GRANULOCYTES # BLD AUTO: 0.01 K/UL (ref 0–0.04)
IMM GRANULOCYTES NFR BLD AUTO: 0.2 % (ref 0–0.5)
INFLUENZA A MOLECULAR (OHS): NEGATIVE
INFLUENZA B MOLECULAR (OHS): NEGATIVE
INR PPP: 1.1 (ref 0.8–1.2)
KETONES UR QL STRIP: NEGATIVE
LACTATE SERPL-SCNC: 0.9 MMOL/L (ref 0.5–2.2)
LDH SERPL L TO P-CCNC: 1.79 MMOL/L (ref 0.5–2.2)
LDH SERPL-CCNC: 189 U/L (ref 110–260)
LEUKOCYTE ESTERASE UR QL STRIP: ABNORMAL
LYMPHOCYTES # BLD AUTO: 1.22 K/UL (ref 1–4.8)
MAGNESIUM SERPL-MCNC: 2 MG/DL (ref 1.6–2.6)
MCH RBC QN AUTO: 31.1 PG (ref 27–31)
MCHC RBC AUTO-ENTMCNC: 31.9 G/DL (ref 32–36)
MCV RBC AUTO: 97 FL (ref 82–98)
MICROSCOPIC COMMENT: ABNORMAL
MODE: NORMAL
NITRITE UR QL STRIP: NEGATIVE
NT-PROBNP SERPL-MCNC: 1095 PG/ML
NUCLEATED RBC (/100WBC) (SMH): 0 /100 WBC
PH UR STRIP: 6 [PH]
PLATELET # BLD AUTO: 148 K/UL (ref 150–450)
PMV BLD AUTO: 12 FL (ref 9.2–12.9)
POTASSIUM SERPL-SCNC: 4.1 MMOL/L (ref 3.5–5.1)
PROCALCITONIN SERPL-MCNC: 0.07 NG/ML
PROT SERPL-MCNC: 7.2 GM/DL (ref 6–8.4)
PROT UR QL STRIP: ABNORMAL
PROTHROMBIN TIME: 12 SECONDS (ref 9–12.5)
RBC # BLD AUTO: 3.96 M/UL (ref 4–5.4)
RBC #/AREA URNS AUTO: >100 /HPF
RELATIVE EOSINOPHIL (SMH): 0 % (ref 0–8)
RELATIVE LYMPHOCYTE (SMH): 27.1 % (ref 18–48)
RELATIVE MONOCYTE (SMH): 12.9 % (ref 4–15)
RELATIVE NEUTROPHIL (SMH): 58.9 % (ref 38–73)
RSV AG SPEC QL IA: NEGATIVE
SAMPLE: NORMAL
SARS-COV-2 RDRP RESP QL NAA+PROBE: POSITIVE
SITE: NORMAL
SODIUM SERPL-SCNC: 138 MMOL/L (ref 136–145)
SP GR UR STRIP: 1.02
TROPONIN I SERPL HS-MCNC: 15 NG/L
TROPONIN I SERPL HS-MCNC: 16 NG/L
TROPONIN I SERPL HS-MCNC: 16 NG/L
UROBILINOGEN UR STRIP-ACNC: NEGATIVE EU/DL
WBC # BLD AUTO: 4.5 K/UL (ref 3.9–12.7)
WBC #/AREA URNS AUTO: >100 /HPF
WBC CLUMPS UR QL AUTO: ABNORMAL

## 2025-04-22 PROCEDURE — 82306 VITAMIN D 25 HYDROXY: CPT

## 2025-04-22 PROCEDURE — 25000003 PHARM REV CODE 250

## 2025-04-22 PROCEDURE — 82728 ASSAY OF FERRITIN: CPT

## 2025-04-22 PROCEDURE — 27000207 HC ISOLATION

## 2025-04-22 PROCEDURE — U0002 COVID-19 LAB TEST NON-CDC: HCPCS | Performed by: STUDENT IN AN ORGANIZED HEALTH CARE EDUCATION/TRAINING PROGRAM

## 2025-04-22 PROCEDURE — 94760 N-INVAS EAR/PLS OXIMETRY 1: CPT

## 2025-04-22 PROCEDURE — 87154 CUL TYP ID BLD PTHGN 6+ TRGT: CPT | Performed by: STUDENT IN AN ORGANIZED HEALTH CARE EDUCATION/TRAINING PROGRAM

## 2025-04-22 PROCEDURE — 83735 ASSAY OF MAGNESIUM: CPT | Performed by: STUDENT IN AN ORGANIZED HEALTH CARE EDUCATION/TRAINING PROGRAM

## 2025-04-22 PROCEDURE — 83605 ASSAY OF LACTIC ACID: CPT

## 2025-04-22 PROCEDURE — 85610 PROTHROMBIN TIME: CPT

## 2025-04-22 PROCEDURE — 85730 THROMBOPLASTIN TIME PARTIAL: CPT

## 2025-04-22 PROCEDURE — 99900035 HC TECH TIME PER 15 MIN (STAT)

## 2025-04-22 PROCEDURE — 36415 COLL VENOUS BLD VENIPUNCTURE: CPT | Performed by: STUDENT IN AN ORGANIZED HEALTH CARE EDUCATION/TRAINING PROGRAM

## 2025-04-22 PROCEDURE — 85651 RBC SED RATE NONAUTOMATED: CPT

## 2025-04-22 PROCEDURE — 87634 RSV DNA/RNA AMP PROBE: CPT | Performed by: STUDENT IN AN ORGANIZED HEALTH CARE EDUCATION/TRAINING PROGRAM

## 2025-04-22 PROCEDURE — 96374 THER/PROPH/DIAG INJ IV PUSH: CPT

## 2025-04-22 PROCEDURE — XW033E5 INTRODUCTION OF REMDESIVIR ANTI-INFECTIVE INTO PERIPHERAL VEIN, PERCUTANEOUS APPROACH, NEW TECHNOLOGY GROUP 5: ICD-10-PCS | Performed by: HOSPITALIST

## 2025-04-22 PROCEDURE — 83880 ASSAY OF NATRIURETIC PEPTIDE: CPT | Performed by: STUDENT IN AN ORGANIZED HEALTH CARE EDUCATION/TRAINING PROGRAM

## 2025-04-22 PROCEDURE — 87502 INFLUENZA DNA AMP PROBE: CPT | Performed by: STUDENT IN AN ORGANIZED HEALTH CARE EDUCATION/TRAINING PROGRAM

## 2025-04-22 PROCEDURE — 87086 URINE CULTURE/COLONY COUNT: CPT | Performed by: STUDENT IN AN ORGANIZED HEALTH CARE EDUCATION/TRAINING PROGRAM

## 2025-04-22 PROCEDURE — 81003 URINALYSIS AUTO W/O SCOPE: CPT | Performed by: STUDENT IN AN ORGANIZED HEALTH CARE EDUCATION/TRAINING PROGRAM

## 2025-04-22 PROCEDURE — 85025 COMPLETE CBC W/AUTO DIFF WBC: CPT | Performed by: STUDENT IN AN ORGANIZED HEALTH CARE EDUCATION/TRAINING PROGRAM

## 2025-04-22 PROCEDURE — 25500020 PHARM REV CODE 255

## 2025-04-22 PROCEDURE — 93010 ELECTROCARDIOGRAM REPORT: CPT | Mod: ,,, | Performed by: GENERAL PRACTICE

## 2025-04-22 PROCEDURE — 63600175 PHARM REV CODE 636 W HCPCS

## 2025-04-22 PROCEDURE — 63600175 PHARM REV CODE 636 W HCPCS: Performed by: STUDENT IN AN ORGANIZED HEALTH CARE EDUCATION/TRAINING PROGRAM

## 2025-04-22 PROCEDURE — 80053 COMPREHEN METABOLIC PANEL: CPT | Performed by: STUDENT IN AN ORGANIZED HEALTH CARE EDUCATION/TRAINING PROGRAM

## 2025-04-22 PROCEDURE — 99285 EMERGENCY DEPT VISIT HI MDM: CPT | Mod: 25

## 2025-04-22 PROCEDURE — 85379 FIBRIN DEGRADATION QUANT: CPT

## 2025-04-22 PROCEDURE — 11000001 HC ACUTE MED/SURG PRIVATE ROOM

## 2025-04-22 PROCEDURE — 94761 N-INVAS EAR/PLS OXIMETRY MLT: CPT

## 2025-04-22 PROCEDURE — 94799 UNLISTED PULMONARY SVC/PX: CPT

## 2025-04-22 PROCEDURE — 82550 ASSAY OF CK (CPK): CPT | Performed by: STUDENT IN AN ORGANIZED HEALTH CARE EDUCATION/TRAINING PROGRAM

## 2025-04-22 PROCEDURE — 83605 ASSAY OF LACTIC ACID: CPT | Performed by: STUDENT IN AN ORGANIZED HEALTH CARE EDUCATION/TRAINING PROGRAM

## 2025-04-22 PROCEDURE — 84484 ASSAY OF TROPONIN QUANT: CPT | Performed by: STUDENT IN AN ORGANIZED HEALTH CARE EDUCATION/TRAINING PROGRAM

## 2025-04-22 PROCEDURE — 87040 BLOOD CULTURE FOR BACTERIA: CPT | Performed by: STUDENT IN AN ORGANIZED HEALTH CARE EDUCATION/TRAINING PROGRAM

## 2025-04-22 PROCEDURE — 93005 ELECTROCARDIOGRAM TRACING: CPT

## 2025-04-22 PROCEDURE — 84484 ASSAY OF TROPONIN QUANT: CPT

## 2025-04-22 PROCEDURE — 96375 TX/PRO/DX INJ NEW DRUG ADDON: CPT

## 2025-04-22 PROCEDURE — 84145 PROCALCITONIN (PCT): CPT | Performed by: STUDENT IN AN ORGANIZED HEALTH CARE EDUCATION/TRAINING PROGRAM

## 2025-04-22 PROCEDURE — 27000221 HC OXYGEN, UP TO 24 HOURS

## 2025-04-22 PROCEDURE — 83615 LACTATE (LD) (LDH) ENZYME: CPT

## 2025-04-22 RX ORDER — IPRATROPIUM BROMIDE AND ALBUTEROL SULFATE 2.5; .5 MG/3ML; MG/3ML
3 SOLUTION RESPIRATORY (INHALATION) EVERY 6 HOURS PRN
Status: DISCONTINUED | OUTPATIENT
Start: 2025-04-22 | End: 2025-04-24 | Stop reason: HOSPADM

## 2025-04-22 RX ORDER — AMLODIPINE BESYLATE 5 MG/1
5 TABLET ORAL DAILY
Status: DISCONTINUED | OUTPATIENT
Start: 2025-04-23 | End: 2025-04-24 | Stop reason: HOSPADM

## 2025-04-22 RX ORDER — GLUCAGON 1 MG
1 KIT INJECTION
Status: DISCONTINUED | OUTPATIENT
Start: 2025-04-22 | End: 2025-04-24 | Stop reason: HOSPADM

## 2025-04-22 RX ORDER — METOPROLOL SUCCINATE 50 MG/1
50 TABLET, EXTENDED RELEASE ORAL DAILY
Status: DISCONTINUED | OUTPATIENT
Start: 2025-04-23 | End: 2025-04-24 | Stop reason: HOSPADM

## 2025-04-22 RX ORDER — DONEPEZIL HYDROCHLORIDE 5 MG/1
10 TABLET, FILM COATED ORAL DAILY
Status: DISCONTINUED | OUTPATIENT
Start: 2025-04-23 | End: 2025-04-24 | Stop reason: HOSPADM

## 2025-04-22 RX ORDER — TALC
9 POWDER (GRAM) TOPICAL NIGHTLY PRN
Status: DISCONTINUED | OUTPATIENT
Start: 2025-04-22 | End: 2025-04-24 | Stop reason: HOSPADM

## 2025-04-22 RX ORDER — ATORVASTATIN CALCIUM 20 MG/1
20 TABLET, FILM COATED ORAL DAILY
Status: DISCONTINUED | OUTPATIENT
Start: 2025-04-23 | End: 2025-04-24 | Stop reason: HOSPADM

## 2025-04-22 RX ORDER — LANOLIN ALCOHOL/MO/W.PET/CERES
800 CREAM (GRAM) TOPICAL
Status: DISCONTINUED | OUTPATIENT
Start: 2025-04-22 | End: 2025-04-24 | Stop reason: HOSPADM

## 2025-04-22 RX ORDER — SODIUM,POTASSIUM PHOSPHATES 280-250MG
2 POWDER IN PACKET (EA) ORAL
Status: DISCONTINUED | OUTPATIENT
Start: 2025-04-22 | End: 2025-04-24 | Stop reason: HOSPADM

## 2025-04-22 RX ORDER — ENOXAPARIN SODIUM 100 MG/ML
30 INJECTION SUBCUTANEOUS EVERY 24 HOURS
Status: DISCONTINUED | OUTPATIENT
Start: 2025-04-22 | End: 2025-04-24

## 2025-04-22 RX ORDER — IBUPROFEN 200 MG
24 TABLET ORAL
Status: DISCONTINUED | OUTPATIENT
Start: 2025-04-22 | End: 2025-04-24 | Stop reason: HOSPADM

## 2025-04-22 RX ORDER — ASCORBIC ACID 500 MG
500 TABLET ORAL 2 TIMES DAILY
Status: DISCONTINUED | OUTPATIENT
Start: 2025-04-22 | End: 2025-04-24 | Stop reason: HOSPADM

## 2025-04-22 RX ORDER — LOSARTAN POTASSIUM 50 MG/1
50 TABLET ORAL DAILY
Status: DISCONTINUED | OUTPATIENT
Start: 2025-04-23 | End: 2025-04-24 | Stop reason: HOSPADM

## 2025-04-22 RX ORDER — ONDANSETRON HYDROCHLORIDE 2 MG/ML
4 INJECTION, SOLUTION INTRAVENOUS EVERY 8 HOURS PRN
Status: DISCONTINUED | OUTPATIENT
Start: 2025-04-22 | End: 2025-04-24 | Stop reason: HOSPADM

## 2025-04-22 RX ORDER — SODIUM CHLORIDE, SODIUM LACTATE, POTASSIUM CHLORIDE, CALCIUM CHLORIDE 600; 310; 30; 20 MG/100ML; MG/100ML; MG/100ML; MG/100ML
INJECTION, SOLUTION INTRAVENOUS CONTINUOUS
Status: DISCONTINUED | OUTPATIENT
Start: 2025-04-22 | End: 2025-04-24 | Stop reason: HOSPADM

## 2025-04-22 RX ORDER — PANTOPRAZOLE SODIUM 40 MG/1
40 TABLET, DELAYED RELEASE ORAL DAILY
Status: DISCONTINUED | OUTPATIENT
Start: 2025-04-23 | End: 2025-04-24 | Stop reason: HOSPADM

## 2025-04-22 RX ORDER — CEFTRIAXONE 1 G/1
1 INJECTION, POWDER, FOR SOLUTION INTRAMUSCULAR; INTRAVENOUS
Status: DISCONTINUED | OUTPATIENT
Start: 2025-04-23 | End: 2025-04-24 | Stop reason: HOSPADM

## 2025-04-22 RX ORDER — IBUPROFEN 200 MG
16 TABLET ORAL
Status: DISCONTINUED | OUTPATIENT
Start: 2025-04-22 | End: 2025-04-24 | Stop reason: HOSPADM

## 2025-04-22 RX ORDER — CEFTRIAXONE 1 G/1
1 INJECTION, POWDER, FOR SOLUTION INTRAMUSCULAR; INTRAVENOUS
Status: COMPLETED | OUTPATIENT
Start: 2025-04-22 | End: 2025-04-22

## 2025-04-22 RX ORDER — DICLOFENAC SODIUM 10 MG/G
2 GEL TOPICAL NIGHTLY
Status: DISCONTINUED | OUTPATIENT
Start: 2025-04-22 | End: 2025-04-24 | Stop reason: HOSPADM

## 2025-04-22 RX ORDER — SODIUM CHLORIDE 0.9 % (FLUSH) 0.9 %
10 SYRINGE (ML) INJECTION
Status: DISCONTINUED | OUTPATIENT
Start: 2025-04-22 | End: 2025-04-24 | Stop reason: HOSPADM

## 2025-04-22 RX ORDER — L. ACIDOPHILUS/L.BULGARICUS 100MM CELL
1 GRANULES IN PACKET (EA) ORAL 2 TIMES DAILY
Status: DISCONTINUED | OUTPATIENT
Start: 2025-04-22 | End: 2025-04-24 | Stop reason: HOSPADM

## 2025-04-22 RX ORDER — ACETAMINOPHEN 325 MG/1
650 TABLET ORAL EVERY 4 HOURS PRN
Status: DISCONTINUED | OUTPATIENT
Start: 2025-04-22 | End: 2025-04-24 | Stop reason: HOSPADM

## 2025-04-22 RX ORDER — LORAZEPAM 2 MG/ML
0.5 INJECTION INTRAMUSCULAR ONCE
Status: COMPLETED | OUTPATIENT
Start: 2025-04-22 | End: 2025-04-22

## 2025-04-22 RX ADMIN — IOHEXOL 75 ML: 350 INJECTION, SOLUTION INTRAVENOUS at 08:04

## 2025-04-22 RX ADMIN — LORAZEPAM 0.5 MG: 2 INJECTION INTRAMUSCULAR; INTRAVENOUS at 07:04

## 2025-04-22 RX ADMIN — REMDESIVIR 200 MG: 100 INJECTION, POWDER, LYOPHILIZED, FOR SOLUTION INTRAVENOUS at 04:04

## 2025-04-22 RX ADMIN — SODIUM CHLORIDE, POTASSIUM CHLORIDE, SODIUM LACTATE AND CALCIUM CHLORIDE: 600; 310; 30; 20 INJECTION, SOLUTION INTRAVENOUS at 06:04

## 2025-04-22 RX ADMIN — ENOXAPARIN SODIUM 30 MG: 30 INJECTION SUBCUTANEOUS at 06:04

## 2025-04-22 RX ADMIN — DICLOFENAC SODIUM 2 G: 10 GEL TOPICAL at 10:04

## 2025-04-22 RX ADMIN — CEFTRIAXONE SODIUM 1 G: 1 INJECTION, POWDER, FOR SOLUTION INTRAMUSCULAR; INTRAVENOUS at 03:04

## 2025-04-22 RX ADMIN — DEXAMETHASONE 6 MG: 2 TABLET ORAL at 06:04

## 2025-04-22 NOTE — HPI
Soni Harris is a 89 year old female with a previous medical history of arthritis, Glaucoma, Hypertension, CVA, and alzheimer's dementia who was brought in by family for cough, decreased activity and fever since 4/19/25. Patient is at baseline mental status of being oriented only to name and follows simple commands however they noted the patient becoming more lethargic since 4/19/25. Family member a bedside reports that daughter of patient was ill with an URI last week. Initial ED evaluation showed patient to be positive for covid but does not currently appear to be in any respiratory distress and was able to maintain and oxygen saturation 100% while laying in bed when oxygen via nasal cannula turned off. Urine studies showed >100WBC and >RBC with clumps and moderate bacteria. IV rocephin initiated in ED and continued upon admit. Hematoma noted to left side forehead but origin of this is unclear. Most recent fall was one month ago out of bed but patient not brought in for evaluation at that time. Possibly patient hit head on side rail. CT of head/c-spine ordered and pending. CBC showed no leukocytosis, CMP showed creatinine of 1.2 but most recent baseline was 0.7 from 2 years prior. D-dimer elevated and tachycardic so CTA of chest pending along with bilateral venous US of lower extremities. proBNP elevated but no peripheral signs of volume overload noted. Patient admitted by hospital medicine for further evaluation and management.

## 2025-04-22 NOTE — SUBJECTIVE & OBJECTIVE
Past Medical History:   Diagnosis Date    Arthritis     GERD (gastroesophageal reflux disease)     Glaucoma (increased eye pressure)     Hypertension     Stroke        Past Surgical History:   Procedure Laterality Date    EYE SURGERY      HYSTERECTOMY      PERCUTANEOUS PINNING OF HIP Right 10/5/2022    Procedure: PINNING, HIP, PERCUTANEOUS;  Surgeon: Tad Jhaveri MD;  Location: [facility];  Service: Orthopedics;  Laterality: Right;       Review of patient's allergies indicates:  No Known Allergies    No current facility-administered medications on file prior to encounter.     Current Outpatient Medications on File Prior to Encounter   Medication Sig    amLODIPine (NORVASC) 5 MG tablet Take 5 mg by mouth once daily.    atorvastatin (LIPITOR) 20 MG tablet Take 20 mg by mouth once daily.    diclofenac sodium (VOLTAREN) 1 % Gel Apply 2 g topically 4 (four) times daily.    donepeziL (ARICEPT) 10 MG tablet Take 10 mg by mouth once daily.    losartan (COZAAR) 50 MG tablet Take 50 mg by mouth once daily.    metoprolol succinate (TOPROL-XL) 50 MG 24 hr tablet Take 50 mg by mouth once daily.    msm-aloe vera-herbal66-emu oil (DEEP BLUE RELIEF) Gel Apply 1 application  topically as needed.    multivit-min-FA-lycopen-lutein 0.4-300-250 mg-mcg-mcg Tab Take 1 tablet by mouth once daily.    omega 3-dha-epa-fish oil 1,000 mg (120 mg-180 mg) Cap Take 1 capsule by mouth once daily.    omeprazole (PRILOSEC) 20 MG capsule Take 20 mg by mouth once daily.    risedronate (ACTONEL) 5 MG tablet Take 5 mg by mouth once a week.    vitamin D (VITAMIN D3) 1000 units Tab Take 5,000 Units by mouth once daily.    aspirin 325 MG tablet Take 1 tablet (325 mg total) by mouth once daily.    memantine (NAMENDA) 10 MG Tab Take 10 mg by mouth once daily.    [DISCONTINUED] alendronate (FOSAMAX) 5 MG Tab Take 5 mg by mouth before breakfast.    [DISCONTINUED] amoxicillin-clavulanate 875-125mg (AUGMENTIN) 875-125 mg per tablet Take 1 tablet by mouth 2  (two) times daily. (Patient not taking: Reported on 10/14/2022)    [DISCONTINUED] calcium citrate (CALCITRATE) 200 mg (950 mg) tablet Take 1 tablet by mouth 2 (two) times daily.    [DISCONTINUED] docusate sodium (COLACE) 100 MG capsule Take 1 capsule (100 mg total) by mouth 2 (two) times daily as needed for Constipation. (Patient not taking: Reported on 10/11/2022)    [DISCONTINUED] docusate sodium (COLACE) 100 MG capsule Take 1 capsule (100 mg total) by mouth once daily. (Patient not taking: Reported on 10/11/2022)    [DISCONTINUED] ibuprofen (ADVIL,MOTRIN) 400 MG tablet Take 400 mg by mouth every 8 (eight) hours as needed for Other.    [DISCONTINUED] metoprolol tartrate (LOPRESSOR) 25 MG tablet Take 22 mg by mouth once daily.    [DISCONTINUED] mv-minerals/FA/omega 3,6,9 #3 (WOMEN'S 50+ ADVANCED ORAL) Take 1 tablet by mouth Daily.    [DISCONTINUED] senna-docusate 8.6-50 mg (PERICOLACE) 8.6-50 mg per tablet Take 1 tablet by mouth once daily.     Family History    Family history is unknown by patient.       Tobacco Use    Smoking status: Former     Current packs/day: 0.00     Types: Cigarettes     Quit date: 2003     Years since quittin.6    Smokeless tobacco: Never   Substance and Sexual Activity    Alcohol use: No    Drug use: No    Sexual activity: Not on file     Review of Systems   Unable to perform ROS: Dementia     Objective:     Vital Signs (Most Recent):  Temp: 97.7 °F (36.5 °C) (25 1233)  Pulse: 88 (25 1657)  Resp: (!) 22 (25 1657)  BP: (!) 145/78 (25 1631)  SpO2: 100 % (25 1657) Vital Signs (24h Range):  Temp:  [97.7 °F (36.5 °C)] 97.7 °F (36.5 °C)  Pulse:  [] 88  Resp:  [22] 22  SpO2:  [96 %-100 %] 100 %  BP: (108-145)/(55-78) 145/78     Weight: 56.7 kg (125 lb)  Body mass index is 19.58 kg/m².     Physical Exam  Vitals reviewed.   Constitutional:       General: She is not in acute distress.     Appearance: She is ill-appearing.   HENT:      Head:  Normocephalic.        Comments: Hematoma of unknown cause     Nose: Nose normal.      Mouth/Throat:      Mouth: Mucous membranes are dry.      Pharynx: Oropharynx is clear.   Eyes:      Conjunctiva/sclera: Conjunctivae normal.   Cardiovascular:      Rate and Rhythm: Normal rate and regular rhythm.      Pulses: Normal pulses.      Heart sounds: Normal heart sounds.   Pulmonary:      Breath sounds: Wheezing present.      Comments: Poor effort  Abdominal:      General: Bowel sounds are normal. There is no distension.      Palpations: Abdomen is soft.      Tenderness: There is no abdominal tenderness. There is no guarding.   Musculoskeletal:         General: Normal range of motion.      Cervical back: Normal range of motion. No tenderness.      Right lower leg: No edema.      Left lower leg: No edema.   Skin:     General: Skin is warm and dry.      Capillary Refill: Capillary refill takes less than 2 seconds.   Neurological:      Mental Status: She is alert.      GCS: GCS eye subscore is 4. GCS verbal subscore is 3. GCS motor subscore is 6.      Comments: Oriented to name only. Will follow simple commands                Significant Labs: All pertinent labs within the past 24 hours have been reviewed.    Bilirubin:   Recent Labs   Lab 04/22/25  1256   BILITOT 0.5       BMP:   Recent Labs   Lab 04/22/25  1256      K 4.1   CO2 22*   BUN 15   CREATININE 1.2   CALCIUM 8.8   MG 2.0     CBC:   Recent Labs   Lab 04/22/25  1256   WBC 4.50   HGB 12.3   HCT 38.5   *     CMP:   Recent Labs   Lab 04/22/25  1256      K 4.1   CO2 22*   BUN 15   CREATININE 1.2   CALCIUM 8.8   ALBUMIN 3.5   BILITOT 0.5   ALKPHOS 85   AST 31   ALT 11     Cardiac Markers:   Recent Labs   Lab 04/22/25  1256   BNP 1,095*     Coagulation:   Recent Labs   Lab 04/22/25  1650   INR 1.1       Magnesium:   Recent Labs   Lab 04/22/25  1256   MG 2.0       Troponin:   Recent Labs   Lab 04/22/25  1256 04/22/25  1650   TROPONINIHS 16* 15*        Urine Studies:   Recent Labs   Lab 04/22/25  1341   APPEARANCEUA Cloudy*   SPECGRAV 1.025   PROTEINUA 2+*   BILIRUBINUA Negative   UROBILINOGEN Negative   LEUKOCYTESUR 3+*   RBCUA >100*   WBCUA >100*   BACTERIA Moderate*       Significant Imaging: I have reviewed all pertinent imaging results/findings within the past 24 hours.  EXAMINATION:  XR CHEST 1 VIEW     CLINICAL HISTORY:  Cough, unspecified     TECHNIQUE:  Single frontal view of the chest was performed.     COMPARISON:  03/22/2015     FINDINGS:  The cardiomediastinal silhouette is within normal limits.  The lungs are well expanded without consolidation or pleural effusion.  Scattered old calcified granulomas are present.  There is mild thoracic dextrocurvature     Impression:     Acute process.  Old granulomatous disease.        Electronically signed by:Rupesh Andrade MD  Date:                                            04/22/2025  Time:                                           14:55

## 2025-04-22 NOTE — ASSESSMENT & PLAN NOTE
-CTA of chest PE study ordered and pending  -Venous US of bilateral lower extremities ordered and pending

## 2025-04-22 NOTE — ASSESSMENT & PLAN NOTE
Patient with dementia with likely etiology of alzheimer's dementia. Dementia is severe. The patient does not have signs of behavioral disturbance. Home dementia medications are Held or Continued: continued.. Continue non-pharmacologic interventions to prevent delirium (No VS between 11PM-5AM, activity during day, opening blinds, providing glasses/hearing aids, and up in chair during daytime). Will avoid narcotics and benzos unless absolutely necessary. PRN anti-psychotics are not prescribed to avoid self harm behaviors.

## 2025-04-22 NOTE — Clinical Note
Diagnosis: UTI (urinary tract infection) [320005]   Future Attending Provider: KHOI DOYLE [[id number]]   Place in Observation: [facility] [[id number]]

## 2025-04-22 NOTE — ASSESSMENT & PLAN NOTE
Patient is identified as High risk for severe complications of COVID 19 based on COVID risk score of 4   Initiate standard COVID protocols; COVID-19 testing ,Infection Control notification  and isolation- respiratory, contact and droplet per protocol    Diagnostics: CBC, CMP, Procalcitonin, Ferritin, CRP, BNP, Troponin, Rapid Flu, Blood Culture x2, Portable CXR, and UA and culture    Management: Initiate targeted therapy with Remdesivir, 200mg IV x1, followed by 100mg IV daily x5 days total and Dexamethasone PO/IV 6mg daily x10 days, Maintain oxygen saturations 92-96% via Nasal Cannula 2 LPM and monitor with continuous/intermittent pulse oximetry. , Inhaled bronchodilators as needed for shortness of breath., and Continuous cardiac monitoring.    Advance Care Planning  Current advance care plan has not been discussed with patient/family/POA and patient currently wishes Full Code.

## 2025-04-22 NOTE — ASSESSMENT & PLAN NOTE
Patient's blood pressure range in the last 24 hours was: BP  Min: 108/55  Max: 145/78.The patient's inpatient anti-hypertensive regimen is listed below:  Current Antihypertensives  losartan tablet 50 mg, Daily, Oral  metoprolol succinate (TOPROL-XL) 24 hr tablet 50 mg, Daily, Oral  amLODIPine tablet 5 mg, Daily, Oral    Plan  - BP is controlled, no changes needed to their regimen

## 2025-04-22 NOTE — PROGRESS NOTES
Pharmacist Renal Dose Adjustment Note    Soni Harris is a 89 y.o. female being treated with the medication lovenox    Patient Data:    Vital Signs (Most Recent):  Temp: 97.7 °F (36.5 °C) (04/22/25 1233)  Pulse: 88 (04/22/25 1657)  Resp: (!) 22 (04/22/25 1657)  BP: (!) 145/78 (04/22/25 1631)  SpO2: 100 % (04/22/25 1657) Vital Signs (72h Range):  Temp:  [97.7 °F (36.5 °C)]   Pulse:  []   Resp:  [22]   BP: (108-145)/(55-78)   SpO2:  [96 %-100 %]      Recent Labs   Lab 04/22/25  1256   CREATININE 1.2     Serum creatinine: 1.2 mg/dL 04/22/25 1256  Estimated creatinine clearance: 27.3 mL/min    Medication:lovenox dose: 40mg frequency every 24 hours will be changed to medication:lovenox dose:30mg frequency:every 24 hours    Pharmacist's Name: Teri Tenorio  Pharmacist's Extension: [phone]

## 2025-04-22 NOTE — ASSESSMENT & PLAN NOTE
Hematoma noted to left side forehead of unknown origin. Family member at bedside reports last fall was from bed one month ago and patient was no evaluated in an emergency room    -CT of head and neck ordered and pending

## 2025-04-22 NOTE — ASSESSMENT & PLAN NOTE
JILLIAN is likely due to pre-renal azotemia due to dehydration. Baseline creatinine is unknown. Most recent creatinine and eGFR are listed below.  Recent Labs     04/22/25  1256   CREATININE 1.2   EGFRNORACEVR 43*      Plan  - JILLIAN is stable  - Avoid nephrotoxins and renally dose meds for GFR listed above  - Monitor urine output, serial BMP, and adjust therapy as needed  - LR 50ml/hour

## 2025-04-22 NOTE — ED PROVIDER NOTES
Encounter Date: 4/22/2025       History     Chief Complaint   Patient presents with    Cough    Fever    Weakness    Chest Congestion     89-year-old female brought in by family for evaluation of cough and fever ongoing for the last few days.  Family member states patient had symptoms 1st then she develop symptoms.  No associated known abdominal pain    The history is provided by the patient and a relative.     Review of patient's allergies indicates:  No Known Allergies  Past Medical History:   Diagnosis Date    Arthritis     GERD (gastroesophageal reflux disease)     Glaucoma (increased eye pressure)     Hypertension     Stroke      Past Surgical History:   Procedure Laterality Date    EYE SURGERY      HYSTERECTOMY      PERCUTANEOUS PINNING OF HIP Right 10/5/2022    Procedure: PINNING, HIP, PERCUTANEOUS;  Surgeon: Tad Jhaveri MD;  Location: [facility];  Service: Orthopedics;  Laterality: Right;     Family History   Family history unknown: Yes     Social History[1]  Review of Systems   All other systems reviewed and are negative.      Physical Exam     Initial Vitals [04/22/25 1233]   BP Pulse Resp Temp SpO2   (!) 118/57 101 (!) 22 97.7 °F (36.5 °C) 97 %      MAP       --         Physical Exam    Nursing note and vitals reviewed.  Constitutional: She is not diaphoretic.   HENT:   Head: Normocephalic.   Eyes: No scleral icterus.   Cardiovascular:  Normal rate.           Pulmonary/Chest: Effort normal. No stridor.   Tachypneic   Abdominal: There is no guarding.   Musculoskeletal:      Comments: No significant asymmetrical swelling     Neurological: She is alert.   Skin: No rash noted. No erythema.         ED Course   Procedures  Labs Reviewed   SARS-COV-2 RNA AMPLIFICATION, QUAL - Abnormal       Result Value    SARS COV-2 Molecular Positive (*)    COMPREHENSIVE METABOLIC PANEL - Abnormal    Sodium 138      Potassium 4.1      Chloride 106      CO2 22 (*)     Glucose 149 (*)     BUN 15      Creatinine 1.2       Calcium 8.8      Protein Total 7.2      Albumin 3.5      Bilirubin Total 0.5      ALP 85      AST 31      ALT 11      Anion Gap 10      eGFR 43 (*)    URINALYSIS, REFLEX TO URINE CULTURE - Abnormal    Color, UA Orange (*)     Appearance, UA Cloudy (*)     Spec Grav UA 1.025      pH, UA 6.0      Protein, UA 2+ (*)     Glucose, UA Trace (*)     Ketones, UA Negative      Blood, UA 3+ (*)     Bilirubin, UA Negative      Urobilinogen, UA Negative      Nitrites, UA Negative      Leukocyte Esterase, UA 3+ (*)    TROPONIN I HIGH SENSITIVITY - Abnormal    Troponin High Sensitive 16 (*)    NT-PRO NATRIURETIC PEPTIDE - Abnormal    NT-proBNP 1,095 (*)    CBC WITH DIFFERENTIAL - Abnormal    WBC 4.50      RBC 3.96 (*)     Hgb 12.3      Hct 38.5      MCV 97      MCH 31.1 (*)     MCHC 31.9 (*)     RDW 12.8      Platelet Count 148 (*)     MPV 12.0      Nucleated RBC 0      Neut % 58.9      Lymph % 27.1      Mono % 12.9      Eos % 0.0      Basophil % 0.9      Imm Grans % 0.2      Neut # 2.7      Lymph # 1.22      Mono # 0.58      Eos # 0.00      Baso # 0.04      Imm Grans # 0.01     URINALYSIS MICROSCOPIC - Abnormal    RBC, UA >100 (*)     WBC, UA >100 (*)     WBC Clumps, UA Few (*)     Bacteria, UA Moderate (*)     Microscopic Comment       INFLUENZA A & B BY MOLECULAR - Normal    INFLUENZA A MOLECULAR Negative      INFLUENZA B MOLECULAR  Negative     RSV ANTIGEN DETECTION - Normal    RSV, RAPID BY MOLECULAR METHOD Negative     CK - Normal    CPK 50     MAGNESIUM - Normal    Magnesium 2.0     PROCALCITONIN - Normal    Procalcitonin 0.07     CULTURE, BLOOD   CULTURE, BLOOD   CULTURE, URINE   CBC W/ AUTO DIFFERENTIAL    Narrative:     The following orders were created for panel order CBC auto differential.  Procedure                               Abnormality         Status                     ---------                               -----------         ------                     CBC with Differential[[id number]]       Abnormal             Final result                 Please view results for these tests on the individual orders.   LACTIC ACID, PLASMA   ISTAT LACTATE    POC Lactate 1.79      Sample VENOUS      Site Other      Allens Test N/A      DelSys Room Air      Mode SPONT            Imaging Results              X-Ray Chest 1 View (Final result)  Result time 04/22/25 14:55:03      Final result by Rupesh Andrade MD (04/22/25 14:55:03)                   Impression:      Acute process.  Old granulomatous disease.      Electronically signed by: Rupesh Andrade MD  Date:    04/22/2025  Time:    14:55               Narrative:    EXAMINATION:  XR CHEST 1 VIEW    CLINICAL HISTORY:  Cough, unspecified    TECHNIQUE:  Single frontal view of the chest was performed.    COMPARISON:  03/22/2015    FINDINGS:  The cardiomediastinal silhouette is within normal limits.  The lungs are well expanded without consolidation or pleural effusion.  Scattered old calcified granulomas are present.  There is mild thoracic dextrocurvature                                       Medications   lactobacillus acidophilus & bulgar 100 million cell packet 1 each (has no administration in time range)   cefTRIAXone injection 1 g (1 g Intravenous Given 4/22/25 1504)     Medical Decision Making  89-year-old female presents with family for worsening generalized weakness, cough and congestion.  Workup initiated demonstrated UTI and COVID positive.  Patient tachypneic with some end-organ stress so placed on nasal cannula for supportive care.  Patient administered IV Rocephin and through shared decision-making with family we will be admitted to hospital for continued care.  Patient and family updated and agree with plan, spoke with hospitalist team who will admit for further management evaluation.    Amount and/or Complexity of Data Reviewed  Independent Historian:      Details: Daughter's  Labs: ordered. Decision-making details documented in ED Course.  Radiology: ordered and  independent interpretation performed. Decision-making details documented in ED Course.  ECG/medicine tests: ordered and independent interpretation performed. Decision-making details documented in ED Course.  Discussion of management or test interpretation with external provider(s): Spoke with Hailey Dunn NP with hospitalist team will admit for further management and evaluation      Risk  Prescription drug management.               ED Course as of 25 1624   Tue 2025   134 EKG rate 105, QRS 78, , sinus tachycardia, no STEMI EKG interpreted by myself Jairo Hernández DO   [KB]   1421 RBC, UA(!): >100 [KB]   1421 WBC, UA(!): >100 [KB]   1421 Color, UA(!): Orange [KB]   1421 Appearance, UA(!): Cloudy [KB]   1421 Leukocyte Esterase, UA(!): 3+ [KB]   1436 RSV Ag by Molecular Method: Negative [KB]   1436 SARS COV-2 MOLECULAR(!): Positive [KB]   1436 Influenza A, Molecular: Negative [KB]   1436 Influenza B, Molecular: Negative [KB]   1436 No large infiltrate on chest x-ray per my interpretation Jairo Hernández DO [KB]   1515 Spoke with Hailey Dunn NP with hospitalist team will admit for further management and evaluation   [KB]      ED Course User Index  [KB] Jairo Hernández Jr., DO                           Clinical Impression:  Final diagnoses:  [R05.9] Cough  [Z13.6] Screening for cardiovascular condition  [U07.1] COVID-19 (Primary)  [R06.82] Tachypnea  [R79.89] Elevated brain natriuretic peptide (BNP) level  [R79.89] Elevated troponin  [N39.0, R31.9] Urinary tract infection with hematuria, site unspecified  [N39.0] UTI (urinary tract infection)          ED Disposition Condition    Observation                   [1]   Social History  Tobacco Use    Smoking status: Former     Current packs/day: 0.00     Types: Cigarettes     Quit date: 2003     Years since quittin.6    Smokeless tobacco: Never   Substance Use Topics    Alcohol use: No    Drug use: No        Jairo Hernández Jr.,  DO  04/22/25 1624

## 2025-04-23 LAB
ALBUMIN SERPL-MCNC: 3.2 G/DL (ref 3.5–5.2)
ALP SERPL-CCNC: 65 UNIT/L (ref 55–135)
ALT SERPL-CCNC: 8 UNIT/L (ref 10–44)
ANION GAP (SMH): 8 MMOL/L (ref 8–16)
AST SERPL-CCNC: 18 UNIT/L (ref 10–40)
BILIRUB SERPL-MCNC: 0.4 MG/DL (ref 0.1–1)
BUN SERPL-MCNC: 18 MG/DL (ref 10–30)
C-REACTIVE PROTEIN (SMH): 0.9 MG/DL
CALCIUM SERPL-MCNC: 8.5 MG/DL (ref 8.7–10.5)
CHLORIDE SERPL-SCNC: 107 MMOL/L (ref 95–110)
CO2 SERPL-SCNC: 23 MMOL/L (ref 23–29)
CREAT SERPL-MCNC: 1 MG/DL (ref 0.5–1.4)
ERYTHROCYTE [DISTWIDTH] IN BLOOD BY AUTOMATED COUNT: 13 % (ref 11.5–14.5)
GFR SERPLBLD CREATININE-BSD FMLA CKD-EPI: 53 ML/MIN/1.73/M2
GLUCOSE SERPL-MCNC: 129 MG/DL (ref 70–110)
HCT VFR BLD AUTO: 37.2 % (ref 37–48.5)
HGB BLD-MCNC: 11.8 GM/DL (ref 12–16)
MAGNESIUM SERPL-MCNC: 2 MG/DL (ref 1.6–2.6)
MCH RBC QN AUTO: 30.7 PG (ref 27–31)
MCHC RBC AUTO-ENTMCNC: 31.7 G/DL (ref 32–36)
MCV RBC AUTO: 97 FL (ref 82–98)
OHS QRS DURATION: 78 MS
OHS QTC CALCULATION: 462 MS
PHOSPHATE SERPL-MCNC: 4 MG/DL (ref 2.7–4.5)
PLATELET # BLD AUTO: 139 K/UL (ref 150–450)
PMV BLD AUTO: 12.1 FL (ref 9.2–12.9)
POTASSIUM SERPL-SCNC: 4.3 MMOL/L (ref 3.5–5.1)
PROT SERPL-MCNC: 6.3 GM/DL (ref 6–8.4)
RBC # BLD AUTO: 3.84 M/UL (ref 4–5.4)
SODIUM SERPL-SCNC: 138 MMOL/L (ref 136–145)
WBC # BLD AUTO: 3.56 K/UL (ref 3.9–12.7)

## 2025-04-23 PROCEDURE — 21400001 HC TELEMETRY ROOM

## 2025-04-23 PROCEDURE — 86140 C-REACTIVE PROTEIN: CPT

## 2025-04-23 PROCEDURE — 83735 ASSAY OF MAGNESIUM: CPT

## 2025-04-23 PROCEDURE — 99900031 HC PATIENT EDUCATION (STAT)

## 2025-04-23 PROCEDURE — 27000207 HC ISOLATION

## 2025-04-23 PROCEDURE — 25000003 PHARM REV CODE 250: Performed by: INTERNAL MEDICINE

## 2025-04-23 PROCEDURE — 63600175 PHARM REV CODE 636 W HCPCS

## 2025-04-23 PROCEDURE — 80053 COMPREHEN METABOLIC PANEL: CPT

## 2025-04-23 PROCEDURE — 99900035 HC TECH TIME PER 15 MIN (STAT)

## 2025-04-23 PROCEDURE — 85027 COMPLETE CBC AUTOMATED: CPT

## 2025-04-23 PROCEDURE — 94761 N-INVAS EAR/PLS OXIMETRY MLT: CPT

## 2025-04-23 PROCEDURE — 63600175 PHARM REV CODE 636 W HCPCS: Performed by: STUDENT IN AN ORGANIZED HEALTH CARE EDUCATION/TRAINING PROGRAM

## 2025-04-23 PROCEDURE — 36415 COLL VENOUS BLD VENIPUNCTURE: CPT

## 2025-04-23 PROCEDURE — 84100 ASSAY OF PHOSPHORUS: CPT

## 2025-04-23 PROCEDURE — 25000003 PHARM REV CODE 250

## 2025-04-23 RX ORDER — MUPIROCIN 20 MG/G
OINTMENT TOPICAL 2 TIMES DAILY
Status: DISCONTINUED | OUTPATIENT
Start: 2025-04-23 | End: 2025-04-24 | Stop reason: HOSPADM

## 2025-04-23 RX ORDER — MORPHINE SULFATE 2 MG/ML
2 INJECTION, SOLUTION INTRAMUSCULAR; INTRAVENOUS ONCE
Refills: 0 | Status: COMPLETED | OUTPATIENT
Start: 2025-04-24 | End: 2025-04-23

## 2025-04-23 RX ADMIN — LOSARTAN POTASSIUM 50 MG: 50 TABLET, FILM COATED ORAL at 08:04

## 2025-04-23 RX ADMIN — ENOXAPARIN SODIUM 30 MG: 30 INJECTION SUBCUTANEOUS at 03:04

## 2025-04-23 RX ADMIN — MORPHINE SULFATE 2 MG: 2 INJECTION, SOLUTION INTRAMUSCULAR; INTRAVENOUS at 11:04

## 2025-04-23 RX ADMIN — AMLODIPINE BESYLATE 5 MG: 5 TABLET ORAL at 08:04

## 2025-04-23 RX ADMIN — REMDESIVIR 100 MG: 100 INJECTION, POWDER, LYOPHILIZED, FOR SOLUTION INTRAVENOUS at 05:04

## 2025-04-23 RX ADMIN — DONEPEZIL HYDROCHLORIDE 10 MG: 5 TABLET, FILM COATED ORAL at 08:04

## 2025-04-23 RX ADMIN — Medication 1 EACH: at 08:04

## 2025-04-23 RX ADMIN — ATORVASTATIN CALCIUM 20 MG: 20 TABLET, FILM COATED ORAL at 08:04

## 2025-04-23 RX ADMIN — MUPIROCIN 1 G: 20 OINTMENT TOPICAL at 08:04

## 2025-04-23 RX ADMIN — OXYCODONE HYDROCHLORIDE AND ACETAMINOPHEN 500 MG: 500 TABLET ORAL at 08:04

## 2025-04-23 RX ADMIN — Medication 9 MG: at 08:04

## 2025-04-23 RX ADMIN — CEFTRIAXONE 1 G: 1 INJECTION, POWDER, FOR SOLUTION INTRAMUSCULAR; INTRAVENOUS at 03:04

## 2025-04-23 RX ADMIN — DEXAMETHASONE 6 MG: 2 TABLET ORAL at 08:04

## 2025-04-23 RX ADMIN — METOPROLOL SUCCINATE 50 MG: 50 TABLET, EXTENDED RELEASE ORAL at 08:04

## 2025-04-23 RX ADMIN — THERA TABS 1 TABLET: TAB at 08:04

## 2025-04-23 NOTE — PLAN OF CARE
[facility]  Initial Discharge Assessment       Primary Care Provider: Fran Gilbert MD    Admission Diagnosis: UTI (urinary tract infection) [N39.0]  COVID-19 [U07.1]    Admission Date: 4/22/2025  Expected Discharge Date: 4/25/2025    Transition of Care Barriers: None    Assessment completed at bedside with pt, daughter, and SABRINA. Pt lives with daughter without any home health services at this time. She request home health at time of discharge. Asked daughter about company, she was unable to recall the name of previous company they used several years ago but voices the wish to go back with that same company. Pt has hx of dementia with worsening in behavior in the afternoon. Case management to continue to follow for needs.    Payor: [employer] [facility] [employer] / Plan: [employer] / Product Type: Medicare Advantage /     Extended Emergency Contact Information  Primary Emergency Contact: Florencia Berman  Address: [address] [place] [address] [place] [address] [place]  Home Phone: [phone]  Mobile Phone: [phone]  Relation: Daughter  Preferred language: English   needed? No    Discharge Plan A: Home Health  Discharge Plan B: Home with family      [id number] - [place] [address]  [place] [address]  Phone: [phone] Fax: [phone]      Initial Assessment (most recent)       Adult Discharge Assessment - 04/23/25 1310          Discharge Assessment    Assessment Type Discharge Planning Assessment     Confirmed/corrected address, phone number and insurance Yes     Confirmed Demographics Correct on Facesheet     Source of Information family     Communicated TRIXIE with patient/caregiver Yes     Reason For Admission UTI     People in Home child(brittany), adult     [facility] From[facility]     Do you expect to return to your current living situation? Yes     Do you have help at home or someone to help you  manage your care at home? No     Prior to hospitilization cognitive status: Inappropriate Behavior     Current cognitive status: Inappropriate Behavior     Walking or Climbing Stairs Difficulty yes     Walking or Climbing Stairs ambulation difficulty, assistance 1 person;stair climbing difficulty, assistance 1 person;transferring difficulty, requires equipment     Dressing/Bathing Difficulty yes     Dressing/Bathing bathing difficulty, assistance 1 person;dressing difficulty, assistance 1 person     Home Accessibility wheelchair accessible     Home Layout Able to live on 1st floor     Equipment Currently Used at Home rollator     Readmission within 30 days? No     Patient currently being followed by outpatient case management? No     Do you currently have service(s) that help you manage your care at home? No     Do you take prescription medications? Yes     Do you have prescription coverage? Yes     Do you have any problems affording any of your prescribed medications? No     Is the patient taking medications as prescribed? yes     Who is going to help you get home at discharge? daughter.     How do you get to doctors appointments? family or friend will provide     Are you on dialysis? No     Do you take coumadin? No     Discharge Plan A Home Health     Discharge Plan B Home with family     DME Needed Upon Discharge  none     Discharge Plan discussed with: Patient;Adult children     Transition of Care Barriers None

## 2025-04-23 NOTE — H&P
[facility] Medicine  History & Physical    Patient Name: Soni Harris  MRN: [id number]  Patient Class: IP- Inpatient  Admission Date: 4/22/2025  Attending Physician: Wilfred Fulton MD   Primary Care Provider: Fran Gilbert MD         Patient information was obtained from patient, relative(s), past medical records, and ER records.     Subjective:     Principal Problem:UTI (urinary tract infection)    Chief Complaint:   Chief Complaint   Patient presents with    Cough    Fever    Weakness    Chest Congestion        HPI: Soni Harris is a 89 year old female with a previous medical history of arthritis, Glaucoma, Hypertension, CVA, and alzheimer's dementia who was brought in by family for cough, decreased activity and fever since 4/19/25. Patient is at baseline mental status of being oriented only to name and follows simple commands however they noted the patient becoming more lethargic since 4/19/25. Family member a bedside reports that daughter of patient was ill with an URI last week. Initial ED evaluation showed patient to be positive for covid but does not currently appear to be in any respiratory distress and was able to maintain and oxygen saturation 100% while laying in bed when oxygen via nasal cannula turned off. Urine studies showed >100WBC and >RBC with clumps and moderate bacteria. IV rocephin initiated in ED and continued upon admit. Hematoma noted to left side forehead but origin of this is unclear. Most recent fall was one month ago out of bed but patient not brought in for evaluation at that time. Possibly patient hit head on side rail. CT of head/c-spine ordered and pending. CBC showed no leukocytosis, CMP showed creatinine of 1.2 but most recent baseline was 0.7 from 2 years prior. Chest xray showed no acute process just Old granulomatous disease. D-dimer elevated and tachycardic so CTA of chest pending along with bilateral venous US of lower extremities.  proBNP elevated but no peripheral signs of volume overload noted. Patient admitted by hospital medicine for further evaluation and management.    Past Medical History:   Diagnosis Date    Arthritis     GERD (gastroesophageal reflux disease)     Glaucoma (increased eye pressure)     Hypertension     Stroke        Past Surgical History:   Procedure Laterality Date    EYE SURGERY      HYSTERECTOMY      PERCUTANEOUS PINNING OF HIP Right 10/5/2022    Procedure: PINNING, HIP, PERCUTANEOUS;  Surgeon: Tad Jhaveri MD;  Location: [facility];  Service: Orthopedics;  Laterality: Right;       Review of patient's allergies indicates:  No Known Allergies    No current facility-administered medications on file prior to encounter.     Current Outpatient Medications on File Prior to Encounter   Medication Sig    amLODIPine (NORVASC) 5 MG tablet Take 5 mg by mouth once daily.    atorvastatin (LIPITOR) 20 MG tablet Take 20 mg by mouth once daily.    diclofenac sodium (VOLTAREN) 1 % Gel Apply 2 g topically 4 (four) times daily.    donepeziL (ARICEPT) 10 MG tablet Take 10 mg by mouth once daily.    losartan (COZAAR) 50 MG tablet Take 50 mg by mouth once daily.    metoprolol succinate (TOPROL-XL) 50 MG 24 hr tablet Take 50 mg by mouth once daily.    msm-aloe vera-herbal66-emu oil (DEEP BLUE RELIEF) Gel Apply 1 application  topically as needed.    multivit-min-FA-lycopen-lutein 0.4-300-250 mg-mcg-mcg Tab Take 1 tablet by mouth once daily.    omega 3-dha-epa-fish oil 1,000 mg (120 mg-180 mg) Cap Take 1 capsule by mouth once daily.    omeprazole (PRILOSEC) 20 MG capsule Take 20 mg by mouth once daily.    risedronate (ACTONEL) 5 MG tablet Take 5 mg by mouth once a week.    vitamin D (VITAMIN D3) 1000 units Tab Take 5,000 Units by mouth once daily.    aspirin 325 MG tablet Take 1 tablet (325 mg total) by mouth once daily.    memantine (NAMENDA) 10 MG Tab Take 10 mg by mouth once daily.    [DISCONTINUED] alendronate (FOSAMAX) 5 MG Tab Take  5 mg by mouth before breakfast.    [DISCONTINUED] amoxicillin-clavulanate 875-125mg (AUGMENTIN) 875-125 mg per tablet Take 1 tablet by mouth 2 (two) times daily. (Patient not taking: Reported on 10/14/2022)    [DISCONTINUED] calcium citrate (CALCITRATE) 200 mg (950 mg) tablet Take 1 tablet by mouth 2 (two) times daily.    [DISCONTINUED] docusate sodium (COLACE) 100 MG capsule Take 1 capsule (100 mg total) by mouth 2 (two) times daily as needed for Constipation. (Patient not taking: Reported on 10/11/2022)    [DISCONTINUED] docusate sodium (COLACE) 100 MG capsule Take 1 capsule (100 mg total) by mouth once daily. (Patient not taking: Reported on 10/11/2022)    [DISCONTINUED] ibuprofen (ADVIL,MOTRIN) 400 MG tablet Take 400 mg by mouth every 8 (eight) hours as needed for Other.    [DISCONTINUED] metoprolol tartrate (LOPRESSOR) 25 MG tablet Take 22 mg by mouth once daily.    [DISCONTINUED] mv-minerals/FA/omega 3,6,9 #3 (WOMEN'S 50+ ADVANCED ORAL) Take 1 tablet by mouth Daily.    [DISCONTINUED] senna-docusate 8.6-50 mg (PERICOLACE) 8.6-50 mg per tablet Take 1 tablet by mouth once daily.     Family History    Family history is unknown by patient.       Tobacco Use    Smoking status: Former     Current packs/day: 0.00     Types: Cigarettes     Quit date: 2003     Years since quittin.6    Smokeless tobacco: Never   Substance and Sexual Activity    Alcohol use: No    Drug use: No    Sexual activity: Not on file     Review of Systems   Unable to perform ROS: Dementia     Objective:     Vital Signs (Most Recent):  Temp: 97.7 °F (36.5 °C) (25 1233)  Pulse: 88 (25 1657)  Resp: (!) 22 (25 165)  BP: (!) 145/78 (25 1631)  SpO2: 100 % (25 165) Vital Signs (24h Range):  Temp:  [97.7 °F (36.5 °C)] 97.7 °F (36.5 °C)  Pulse:  [] 88  Resp:  [22] 22  SpO2:  [96 %-100 %] 100 %  BP: (108-145)/(55-78) 145/78     Weight: 56.7 kg (125 lb)  Body mass index is 19.58 kg/m².     Physical Exam  Vitals  reviewed.   Constitutional:       General: She is not in acute distress.     Appearance: She is ill-appearing.   HENT:      Head: Normocephalic.        Comments: Hematoma of unknown cause     Nose: Nose normal.      Mouth/Throat:      Mouth: Mucous membranes are dry.      Pharynx: Oropharynx is clear.   Eyes:      Conjunctiva/sclera: Conjunctivae normal.   Cardiovascular:      Rate and Rhythm: Normal rate and regular rhythm.      Pulses: Normal pulses.      Heart sounds: Normal heart sounds.   Pulmonary:      Breath sounds: Wheezing present.      Comments: Poor effort  Abdominal:      General: Bowel sounds are normal. There is no distension.      Palpations: Abdomen is soft.      Tenderness: There is no abdominal tenderness. There is no guarding.   Musculoskeletal:         General: Normal range of motion.      Cervical back: Normal range of motion. No tenderness.      Right lower leg: No edema.      Left lower leg: No edema.   Skin:     General: Skin is warm and dry.      Capillary Refill: Capillary refill takes less than 2 seconds.   Neurological:      Mental Status: She is alert.      GCS: GCS eye subscore is 4. GCS verbal subscore is 3. GCS motor subscore is 6.      Comments: Oriented to name only. Will follow simple commands                Significant Labs: All pertinent labs within the past 24 hours have been reviewed.    Bilirubin:   Recent Labs   Lab 04/22/25  1256   BILITOT 0.5       BMP:   Recent Labs   Lab 04/22/25  1256      K 4.1   CO2 22*   BUN 15   CREATININE 1.2   CALCIUM 8.8   MG 2.0     CBC:   Recent Labs   Lab 04/22/25  1256   WBC 4.50   HGB 12.3   HCT 38.5   *     CMP:   Recent Labs   Lab 04/22/25  1256      K 4.1   CO2 22*   BUN 15   CREATININE 1.2   CALCIUM 8.8   ALBUMIN 3.5   BILITOT 0.5   ALKPHOS 85   AST 31   ALT 11     Cardiac Markers:   Recent Labs   Lab 04/22/25  1256   BNP 1,095*     Coagulation:   Recent Labs   Lab 04/22/25  1650   INR 1.1       Magnesium:   Recent  Labs   Lab 04/22/25  1256   MG 2.0       Troponin:   Recent Labs   Lab 04/22/25  1256 04/22/25  1650   TROPONINIHS 16* 15*       Urine Studies:   Recent Labs   Lab 04/22/25  1341   APPEARANCEUA Cloudy*   SPECGRAV 1.025   PROTEINUA 2+*   BILIRUBINUA Negative   UROBILINOGEN Negative   LEUKOCYTESUR 3+*   RBCUA >100*   WBCUA >100*   BACTERIA Moderate*       Significant Imaging: I have reviewed all pertinent imaging results/findings within the past 24 hours.  EXAMINATION:  XR CHEST 1 VIEW     CLINICAL HISTORY:  Cough, unspecified     TECHNIQUE:  Single frontal view of the chest was performed.     COMPARISON:  03/22/2015     FINDINGS:  The cardiomediastinal silhouette is within normal limits.  The lungs are well expanded without consolidation or pleural effusion.  Scattered old calcified granulomas are present.  There is mild thoracic dextrocurvature     Impression:     Acute process.  Old granulomatous disease.        Electronically signed by:Rupesh Andrade MD  Date:                                            04/22/2025  Time:                                           14:55  Assessment/Plan:     Assessment & Plan  UTI (urinary tract infection)  -urine culture pending  -IV rocephin daily    Hypertension  Patient's blood pressure range in the last 24 hours was: BP  Min: 108/55  Max: 145/78.The patient's inpatient anti-hypertensive regimen is listed below:  Current Antihypertensives  losartan tablet 50 mg, Daily, Oral  metoprolol succinate (TOPROL-XL) 24 hr tablet 50 mg, Daily, Oral  amLODIPine tablet 5 mg, Daily, Oral    Plan  - BP is controlled, no changes needed to their regimen    Late onset Alzheimer's dementia with behavioral disturbance  Patient with dementia with likely etiology of alzheimer's dementia. Dementia is severe. The patient does not have signs of behavioral disturbance. Home dementia medications are Held or Continued: continued.. Continue non-pharmacologic interventions to prevent delirium (No VS  between 11PM-5AM, activity during day, opening blinds, providing glasses/hearing aids, and up in chair during daytime). Will avoid narcotics and benzos unless absolutely necessary. PRN anti-psychotics are not prescribed to avoid self harm behaviors.  COVID-19  Patient is identified as High risk for severe complications of COVID 19 based on COVID risk score of 4   Initiate standard COVID protocols; COVID-19 testing ,Infection Control notification  and isolation- respiratory, contact and droplet per protocol    Diagnostics: CBC, CMP, Procalcitonin, Ferritin, CRP, BNP, Troponin, Rapid Flu, Blood Culture x2, Portable CXR, and UA and culture    Management: Initiate targeted therapy with Remdesivir, 200mg IV x1, followed by 100mg IV daily x5 days total and Dexamethasone PO/IV 6mg daily x10 days, Maintain oxygen saturations 92-96% via Nasal Cannula 2 LPM and monitor with continuous/intermittent pulse oximetry. , Inhaled bronchodilators as needed for shortness of breath., and Continuous cardiac monitoring.    Advance Care Planning  Current advance care plan has not been discussed with patient/family/POA and patient currently wishes Full Code.   Hematoma of face  Hematoma noted to left side forehead of unknown origin. Family member at bedside reports last fall was from bed one month ago and patient was no evaluated in an emergency room    -CT of head and neck ordered and pending  Elevated d-dimer  -CTA of chest PE study ordered and pending  -Venous US of bilateral lower extremities ordered and pending    JILLIAN (acute kidney injury)  JILLIAN is likely due to pre-renal azotemia due to dehydration. Baseline creatinine is unknown. Most recent creatinine and eGFR are listed below.  Recent Labs     04/22/25  1256   CREATININE 1.2   EGFRNORACEVR 43*      Plan  - JILLIAN is stable  - Avoid nephrotoxins and renally dose meds for GFR listed above  - Monitor urine output, serial BMP, and adjust therapy as needed  - LR 50ml/hour  VTE Risk  Mitigation (From admission, onward)           Ordered     enoxaparin injection 30 mg  Every 24 hours         04/22/25 8292                               Pharmacist Renal Dose Adjustment Note    Soni Harris is a 89 y.o. female being treated with the medication lovenox    Patient Data:    Vital Signs (Most Recent):  Temp: 97.7 °F (36.5 °C) (04/22/25 1233)  Pulse: 88 (04/22/25 1657)  Resp: (!) 22 (04/22/25 1657)  BP: (!) 145/78 (04/22/25 1631)  SpO2: 100 % (04/22/25 1657) Vital Signs (72h Range):  Temp:  [97.7 °F (36.5 °C)]   Pulse:  []   Resp:  [22]   BP: (108-145)/(55-78)   SpO2:  [96 %-100 %]      Recent Labs   Lab 04/22/25  1256   CREATININE 1.2     Serum creatinine: 1.2 mg/dL 04/22/25 1256  Estimated creatinine clearance: 27.3 mL/min    Medication:lovenox dose: 40mg frequency every 24 hours will be changed to medication:lovenox dose:30mg frequency:every 24 hours    Pharmacist's Name: Teri Tenorio  Pharmacist's Extension: [phone]      Hailey Dunn NP  Department of [facility] Medicine  [facility]

## 2025-04-24 VITALS
SYSTOLIC BLOOD PRESSURE: 124 MMHG | OXYGEN SATURATION: 99 % | DIASTOLIC BLOOD PRESSURE: 74 MMHG | HEIGHT: 67 IN | BODY MASS INDEX: 19.37 KG/M2 | HEART RATE: 66 BPM | RESPIRATION RATE: 19 BRPM | WEIGHT: 123.44 LBS | TEMPERATURE: 98 F

## 2025-04-24 DIAGNOSIS — U07.1 COVID-19 VIRUS DETECTED: ICD-10-CM

## 2025-04-24 PROBLEM — N17.9 AKI (ACUTE KIDNEY INJURY): Status: RESOLVED | Noted: 2025-04-22 | Resolved: 2025-04-24

## 2025-04-24 LAB
ACINETOBACTER CALCOACETICUS-BAUMANNII COMPLEX (SMH): NOT DETECTED
ALBUMIN SERPL-MCNC: 3 G/DL (ref 3.5–5.2)
ALP SERPL-CCNC: 56 UNIT/L (ref 55–135)
ALT SERPL-CCNC: 6 UNIT/L (ref 10–44)
ANION GAP (SMH): 6 MMOL/L (ref 8–16)
AST SERPL-CCNC: 19 UNIT/L (ref 10–40)
BACTERIA UR CULT: NORMAL
BACTEROIDES FRAGILIS (SMH): NOT DETECTED
BILIRUB SERPL-MCNC: 0.3 MG/DL (ref 0.1–1)
BUN SERPL-MCNC: 26 MG/DL (ref 10–30)
C AURIS DNA BLD POS QL NAA+NON-PROBE: NOT DETECTED
C GATTII+NEOFOR DNA CSF QL NAA+NON-PROBE: NOT DETECTED
CALCIUM SERPL-MCNC: 8.4 MG/DL (ref 8.7–10.5)
CANDIDA ALBICANS (SMH): NOT DETECTED
CANDIDA GLABRATA (SMH): NOT DETECTED
CANDIDA KRUSEI (SMH): NOT DETECTED
CANDIDA PARAPSILOSIS (SMH): NOT DETECTED
CANDIDA TROPICALIS (SMH): NOT DETECTED
CHLORIDE SERPL-SCNC: 110 MMOL/L (ref 95–110)
CO2 SERPL-SCNC: 22 MMOL/L (ref 23–29)
CREAT SERPL-MCNC: 1 MG/DL (ref 0.5–1.4)
CTX-M (SMH): ABNORMAL
ENTEROBACTER CLOACAE COMPLEX (SMH): NOT DETECTED
ENTEROBACTERALES (SMH): NOT DETECTED
ENTEROCOCCUS FAECALIS (SMH): NOT DETECTED
ENTEROCOCCUS FAECIUM (SMH): NOT DETECTED
ERYTHROCYTE [DISTWIDTH] IN BLOOD BY AUTOMATED COUNT: 13 % (ref 11.5–14.5)
ESCHERICHIA COLI (SMH): NOT DETECTED
GFR SERPLBLD CREATININE-BSD FMLA CKD-EPI: 53 ML/MIN/1.73/M2
GLUCOSE SERPL-MCNC: 144 MG/DL (ref 70–110)
GP B STREP DNA CSF QL NAA+NON-PROBE: NOT DETECTED
HAEM INFLU DNA CSF QL NAA+NON-PROBE: NOT DETECTED
HCT VFR BLD AUTO: 36.8 % (ref 37–48.5)
HGB BLD-MCNC: 11.6 GM/DL (ref 12–16)
IMP (SMH): ABNORMAL
KLEBSIELLA AEROGENES (SMH): NOT DETECTED
KLEBSIELLA OXYTOCA (SMH): NOT DETECTED
KLEBSIELLA PNEUMONIAE GROUP (SMH): NOT DETECTED
KPC (SMH): ABNORMAL
L MONOCYTOG DNA CSF QL NAA+NON-PROBE: NOT DETECTED
MAGNESIUM SERPL-MCNC: 2 MG/DL (ref 1.6–2.6)
MCH RBC QN AUTO: 30.7 PG (ref 27–31)
MCHC RBC AUTO-ENTMCNC: 31.5 G/DL (ref 32–36)
MCR-1 (SMH): ABNORMAL
MCV RBC AUTO: 97 FL (ref 82–98)
MECA/C (SMH): ABNORMAL
MECA/C AND MREJ (MRSA)(SMH): ABNORMAL
N MEN DNA CSF QL NAA+NON-PROBE: NOT DETECTED
NDM (SMH): ABNORMAL
OXA-48-LIKE (SMH): ABNORMAL
PHOSPHATE SERPL-MCNC: 4 MG/DL (ref 2.7–4.5)
PLATELET # BLD AUTO: 133 K/UL (ref 150–450)
PMV BLD AUTO: 12 FL (ref 9.2–12.9)
POTASSIUM SERPL-SCNC: 4.9 MMOL/L (ref 3.5–5.1)
PROT SERPL-MCNC: 5.8 GM/DL (ref 6–8.4)
PROTEUS SPP. (SMH): NOT DETECTED
PSEUDOMONAS AERUGINOSA (SMH): NOT DETECTED
RBC # BLD AUTO: 3.78 M/UL (ref 4–5.4)
S ENT+BONG DNA STL QL NAA+NON-PROBE: NOT DETECTED
S PNEUM DNA CSF QL NAA+NON-PROBE: NOT DETECTED
SERRATIA MARCESCENS (SMH): NOT DETECTED
SODIUM SERPL-SCNC: 138 MMOL/L (ref 136–145)
STAPHYLOCOCCUS AUREUS (SMH): NOT DETECTED
STAPHYLOCOCCUS EPIDERMIDIS (SMH): NOT DETECTED
STAPHYLOCOCCUS LUGDUNENSIS (SMH): NOT DETECTED
STAPHYLOCOCCUS SPP. (SMH): DETECTED
STENOTROPHOMONAS MALTOPHILIA (SMH): NOT DETECTED
STREPTOCOCCUS PYOGENES (GROUP A)(SMH): NOT DETECTED
STREPTOCOCCUS SPP. (SMH): NOT DETECTED
VANA/B (SMH): ABNORMAL
VIM (SMH): ABNORMAL
WBC # BLD AUTO: 4.5 K/UL (ref 3.9–12.7)

## 2025-04-24 PROCEDURE — 84100 ASSAY OF PHOSPHORUS: CPT

## 2025-04-24 PROCEDURE — 93005 ELECTROCARDIOGRAM TRACING: CPT

## 2025-04-24 PROCEDURE — 25000003 PHARM REV CODE 250: Performed by: INTERNAL MEDICINE

## 2025-04-24 PROCEDURE — 85027 COMPLETE CBC AUTOMATED: CPT

## 2025-04-24 PROCEDURE — 36415 COLL VENOUS BLD VENIPUNCTURE: CPT

## 2025-04-24 PROCEDURE — 80053 COMPREHEN METABOLIC PANEL: CPT

## 2025-04-24 PROCEDURE — 63600175 PHARM REV CODE 636 W HCPCS

## 2025-04-24 PROCEDURE — 83735 ASSAY OF MAGNESIUM: CPT

## 2025-04-24 PROCEDURE — 25000003 PHARM REV CODE 250

## 2025-04-24 PROCEDURE — 93010 ELECTROCARDIOGRAM REPORT: CPT | Mod: ,,, | Performed by: GENERAL PRACTICE

## 2025-04-24 RX ORDER — ENOXAPARIN SODIUM 100 MG/ML
40 INJECTION SUBCUTANEOUS EVERY 24 HOURS
Status: DISCONTINUED | OUTPATIENT
Start: 2025-04-24 | End: 2025-04-24 | Stop reason: HOSPADM

## 2025-04-24 RX ORDER — CEPHALEXIN 500 MG/1
500 CAPSULE ORAL EVERY 12 HOURS
Qty: 10 CAPSULE | Refills: 0 | Status: SHIPPED | OUTPATIENT
Start: 2025-04-24 | End: 2025-04-29

## 2025-04-24 RX ORDER — ASCORBIC ACID 500 MG
500 TABLET ORAL 2 TIMES DAILY
Qty: 60 TABLET | Refills: 0 | Status: SHIPPED | OUTPATIENT
Start: 2025-04-24 | End: 2025-05-24

## 2025-04-24 RX ORDER — L. ACIDOPHILUS/L.BULGARICUS 100MM CELL
1 GRANULES IN PACKET (EA) ORAL 2 TIMES DAILY
Qty: 14 PACKET | Refills: 0 | Status: SHIPPED | OUTPATIENT
Start: 2025-04-24 | End: 2025-05-01

## 2025-04-24 RX ADMIN — ATORVASTATIN CALCIUM 20 MG: 20 TABLET, FILM COATED ORAL at 08:04

## 2025-04-24 RX ADMIN — AMLODIPINE BESYLATE 5 MG: 5 TABLET ORAL at 08:04

## 2025-04-24 RX ADMIN — OXYCODONE HYDROCHLORIDE AND ACETAMINOPHEN 500 MG: 500 TABLET ORAL at 08:04

## 2025-04-24 RX ADMIN — DEXAMETHASONE 6 MG: 2 TABLET ORAL at 08:04

## 2025-04-24 RX ADMIN — PANTOPRAZOLE SODIUM 40 MG: 40 TABLET, DELAYED RELEASE ORAL at 06:04

## 2025-04-24 RX ADMIN — Medication 1 EACH: at 08:04

## 2025-04-24 RX ADMIN — MUPIROCIN 1 G: 20 OINTMENT TOPICAL at 08:04

## 2025-04-24 RX ADMIN — LOSARTAN POTASSIUM 50 MG: 50 TABLET, FILM COATED ORAL at 08:04

## 2025-04-24 RX ADMIN — DONEPEZIL HYDROCHLORIDE 10 MG: 5 TABLET, FILM COATED ORAL at 08:04

## 2025-04-24 RX ADMIN — THERA TABS 1 TABLET: TAB at 08:04

## 2025-04-24 NOTE — PROGRESS NOTES
Critical access hospital Medicine  Progress Note    Patient Name: Soni Harris  MRN: [id number]  Patient Class: IP- Inpatient   Admission Date: 4/22/2025  Length of Stay: 1 days  Attending Physician: Lulu Davis MD  Primary Care Provider: Fran Gilbert MD        Subjective     Principal Problem:UTI (urinary tract infection)        HPI:  Soni Harris is a 89 year old female with a previous medical history of arthritis, Glaucoma, Hypertension, CVA, and alzheimer's dementia who was brought in by family for cough, decreased activity and fever since 4/19/25. Patient is at baseline mental status of being oriented only to name and follows simple commands however they noted the patient becoming more lethargic since 4/19/25. Family member a bedside reports that daughter of patient was ill with an URI last week. Initial ED evaluation showed patient to be positive for covid but does not currently appear to be in any respiratory distress and was able to maintain and oxygen saturation 100% while laying in bed when oxygen via nasal cannula turned off. Urine studies showed >100WBC and >RBC with clumps and moderate bacteria. IV rocephin initiated in ED and continued upon admit. Hematoma noted to left side forehead but origin of this is unclear. Most recent fall was one month ago out of bed but patient not brought in for evaluation at that time. Possibly patient hit head on side rail. CT of head/c-spine ordered and pending. CBC showed no leukocytosis, CMP showed creatinine of 1.2 but most recent baseline was 0.7 from 2 years prior. D-dimer elevated and tachycardic so CTA of chest pending along with bilateral venous US of lower extremities. proBNP elevated but no peripheral signs of volume overload noted. Patient admitted by hospital medicine for further evaluation and management.    Overview/Hospital Course:  PAtient transferred from [facility] with UTI and Covid +. On covid protocol and IV rocephin.  Cultures pending. Transfer out of stepdown.     Interval History: patient cannot give history, daughter and son-n-law give history that she became more lethargic and decrease appetite with mild cough     Review of Systems   Unable to perform ROS: Acuity of condition     Objective:     Vital Signs (Most Recent):  Temp: 98 °F (36.7 °C) (04/23/25 2015)  Pulse: 69 (04/23/25 1700)  Resp: (!) 27 (04/23/25 1700)  BP: (!) 104/54 (04/23/25 1700)  SpO2: 96 % (04/23/25 1700) Vital Signs (24h Range):  Temp:  [96.5 °F (35.8 °C)-98.1 °F (36.7 °C)] 98 °F (36.7 °C)  Pulse:  [49-93] 69  Resp:  [14-28] 27  SpO2:  [73 %-100 %] 96 %  BP: ()/(53-77) 104/54     Weight: 56.7 kg (125 lb)  Body mass index is 19.58 kg/m².    Intake/Output Summary (Last 24 hours) at 4/23/2025 2226  Last data filed at 4/23/2025 1823  Gross per 24 hour   Intake 370 ml   Output --   Net 370 ml         Physical Exam  Constitutional:       General: She is not in acute distress.     Appearance: She is ill-appearing.   Cardiovascular:      Rate and Rhythm: Normal rate and regular rhythm.      Heart sounds: Murmur heard.   Pulmonary:      Effort: Pulmonary effort is normal. No respiratory distress.      Breath sounds: No wheezing.   Abdominal:      General: Bowel sounds are normal. There is no distension.      Tenderness: There is no abdominal tenderness.   Musculoskeletal:         General: No swelling.      Cervical back: Neck supple. No rigidity.   Skin:     General: Skin is warm and dry.   Neurological:      Mental Status: Mental status is at baseline.      Motor: Weakness present.      Coordination: Coordination abnormal.   Psychiatric:      Comments: Easily agitated                Significant Labs: All pertinent labs within the past 24 hours have been reviewed.  Recent Lab Results         04/23/25  0641        Albumin 3.2       ALP 65       ALT 8       Anion Gap 8       AST 18       BILIRUBIN TOTAL 0.4  Comment: For infants and newborns, interpretation of  results should be based   on gestational age, weight and in agreement with clinical   observations.    Premature Infant recommended reference ranges:   0-24 hours:  <8.0 mg/dL   24-48 hours: <12.0 mg/dL   3-5 days:    <15.0 mg/dL   6-29 days:   <15.0 mg/dL       BUN 18       Calcium 8.5       Chloride 107       CO2 23       Creatinine 1.0       CRP 0.90  Comment: CRP-Normal Application expected values:          <1.0        mg/dL   Normal Range          1.0 - 5.0  mg/dL   Indicates mild inflammation          5.0 - 10.0 mg/dL   Indicates severe inflammation        >10.0        mg/dL   Represents serious processes and frequently                                 indicates the presence of a bacterial infection.        eGFR 53       Glucose 129       Hematocrit 37.2       Hemoglobin 11.8       Magnesium  2.0       MCH 30.7       MCHC 31.7       MCV 97       MPV 12.1       Phosphorus Level 4.0       Platelet Count 139       Potassium 4.3       PROTEIN TOTAL 6.3       RBC 3.84       RDW 13.0       Sodium 138       WBC 3.56               Significant Imaging: I have reviewed all pertinent imaging results/findings within the past 24 hours.      Assessment & Plan  UTI (urinary tract infection)  -urine culture pending  -IV rocephin daily    Hypertension  Patient's blood pressure range in the last 24 hours was: BP  Min: 88/54  Max: 163/74.The patient's inpatient anti-hypertensive regimen is listed below:  Current Antihypertensives  losartan tablet 50 mg, Daily, Oral  metoprolol succinate (TOPROL-XL) 24 hr tablet 50 mg, Daily, Oral  amLODIPine tablet 5 mg, Daily, Oral    Plan  - BP is controlled, no changes needed to their regimen    Late onset Alzheimer's dementia with behavioral disturbance  Patient with dementia with likely etiology of alzheimer's dementia. Dementia is severe. The patient does not have signs of behavioral disturbance. Home dementia medications are Held or Continued: continued.. Continue non-pharmacologic  interventions to prevent delirium (No VS between 11PM-5AM, activity during day, opening blinds, providing glasses/hearing aids, and up in chair during daytime). Will avoid narcotics and benzos unless absolutely necessary. PRN anti-psychotics are not prescribed to avoid self harm behaviors.  COVID-19  Patient is identified as High risk for severe complications of COVID 19 based on COVID risk score of 4   Initiate standard COVID protocols; COVID-19 testing ,Infection Control notification  and isolation- respiratory, contact and droplet per protocol    Diagnostics: CBC, CMP, Procalcitonin, Ferritin, CRP, BNP, Troponin, Rapid Flu, Blood Culture x2, Portable CXR, and UA and culture    Management: Initiate targeted therapy with Remdesivir, 200mg IV x1, followed by 100mg IV daily x5 days total and Dexamethasone PO/IV 6mg daily x10 days, Maintain oxygen saturations 92-96% via Nasal Cannula 2 LPM and monitor with continuous/intermittent pulse oximetry. , Inhaled bronchodilators as needed for shortness of breath., and Continuous cardiac monitoring.    Advance Care Planning  Current advance care plan has not been discussed with patient/family/POA and patient currently wishes Full Code.   Hematoma of face  Hematoma noted to left side forehead of unknown origin. Family member at bedside reports last fall was from bed one month ago and patient was no evaluated in an emergency room    -CT of head and neck ordered and pending  Elevated d-dimer  -CTA of chest PE study ordered and pending  -Venous US of bilateral lower extremities ordered and pending    JILLIAN (acute kidney injury)  JILLIAN is likely due to pre-renal azotemia due to dehydration. Baseline creatinine is unknown. Most recent creatinine and eGFR are listed below.  Recent Labs     04/22/25  1256 04/23/25  0641   CREATININE 1.2 1.0   EGFRNORACEVR 43* 53*      Plan  - JILLIAN is stable  - Avoid nephrotoxins and renally dose meds for GFR listed above  - Monitor urine output, serial  BMP, and adjust therapy as needed  - LR 50ml/hour  VTE Risk Mitigation (From admission, onward)           Ordered     enoxaparin injection 30 mg  Every 24 hours         04/22/25 2031                    Discharge Planning   TRIXIE: 4/25/2025     Code Status: Full Code   Medical Readiness for Discharge Date:   Discharge Plan A: Home Health                        Lulu Davis MD  Department of Hospital Medicine   [facility]

## 2025-04-24 NOTE — PLAN OF CARE
Pt clear to discharge home with surgical mask due to COVID positive. [facility] to start care on Monday. Follow up with PCP scheduled. Home health and follow up added to AVS for reference. No further needs known at this time. Daughter to provide transport at time of departure.      04/24/25 1533   Final Note   Assessment Type Final Discharge Note   Anticipated Discharge Disposition Home-Health   What phone number can be called within the next 1-3 days to see how you are doing after discharge? [phone]   Hospital Resources/Appts/Education Provided Provided patient/caregiver with written discharge plan information;Appointments scheduled and added to AVS;Post-Acute resouces added to AVS   Post-Acute Status   Home Health Status Set-up Complete/Auth obtained   Discharge Delays None known at this time

## 2025-04-24 NOTE — ASSESSMENT & PLAN NOTE
Patient's blood pressure range in the last 24 hours was: BP  Min: 88/54  Max: 163/74.The patient's inpatient anti-hypertensive regimen is listed below:  Current Antihypertensives  losartan tablet 50 mg, Daily, Oral  metoprolol succinate (TOPROL-XL) 24 hr tablet 50 mg, Daily, Oral  amLODIPine tablet 5 mg, Daily, Oral    Plan  - BP is controlled, no changes needed to their regimen

## 2025-04-24 NOTE — ASSESSMENT & PLAN NOTE
JILLIAN is likely due to pre-renal azotemia due to dehydration. Baseline creatinine is unknown. Most recent creatinine and eGFR are listed below.  Recent Labs     04/22/25  1256 04/23/25  0641   CREATININE 1.2 1.0   EGFRNORACEVR 43* 53*      Plan  - JILLIAN is stable  - Avoid nephrotoxins and renally dose meds for GFR listed above  - Monitor urine output, serial BMP, and adjust therapy as needed  - LR 50ml/hour

## 2025-04-24 NOTE — PLAN OF CARE
cm met with patient bedside. Form is uploaded into .      04/24/25 6027   Medicare Message   Important Message from Medicare regarding Discharge Appeal Rights Signed/date by patient/caregiver;Explained to patient/caregiver   Date IMM was signed 04/24/25   Time IMM was signed 0900

## 2025-04-24 NOTE — HOSPITAL COURSE
PAtient transferred from [facility] with UTI and Covid +. On covid protocol and IV rocephin. Cultures pending. Transfer out of stepdown.     URine culture multiorganism.  Clinically better  Post discharge, lab called with one bottle positive Staph species  Patient discharged on Keflex.  Will monitor repeat culture days and if something else results will notify patient.     KS home with  services

## 2025-04-24 NOTE — SUBJECTIVE & OBJECTIVE
Interval History: patient cannot give history, daughter and son-n-law give history that she became more lethargic and decrease appetite with mild cough     Review of Systems   Unable to perform ROS: Acuity of condition     Objective:     Vital Signs (Most Recent):  Temp: 98 °F (36.7 °C) (04/23/25 2015)  Pulse: 69 (04/23/25 1700)  Resp: (!) 27 (04/23/25 1700)  BP: (!) 104/54 (04/23/25 1700)  SpO2: 96 % (04/23/25 1700) Vital Signs (24h Range):  Temp:  [96.5 °F (35.8 °C)-98.1 °F (36.7 °C)] 98 °F (36.7 °C)  Pulse:  [49-93] 69  Resp:  [14-28] 27  SpO2:  [73 %-100 %] 96 %  BP: ()/(53-77) 104/54     Weight: 56.7 kg (125 lb)  Body mass index is 19.58 kg/m².    Intake/Output Summary (Last 24 hours) at 4/23/2025 2226  Last data filed at 4/23/2025 1823  Gross per 24 hour   Intake 370 ml   Output --   Net 370 ml         Physical Exam  Constitutional:       General: She is not in acute distress.     Appearance: She is ill-appearing.   Cardiovascular:      Rate and Rhythm: Normal rate and regular rhythm.      Heart sounds: Murmur heard.   Pulmonary:      Effort: Pulmonary effort is normal. No respiratory distress.      Breath sounds: No wheezing.   Abdominal:      General: Bowel sounds are normal. There is no distension.      Tenderness: There is no abdominal tenderness.   Musculoskeletal:         General: No swelling.      Cervical back: Neck supple. No rigidity.   Skin:     General: Skin is warm and dry.   Neurological:      Mental Status: Mental status is at baseline.      Motor: Weakness present.      Coordination: Coordination abnormal.   Psychiatric:      Comments: Easily agitated                Significant Labs: All pertinent labs within the past 24 hours have been reviewed.  Recent Lab Results         04/23/25  0641        Albumin 3.2       ALP 65       ALT 8       Anion Gap 8       AST 18       BILIRUBIN TOTAL 0.4  Comment: For infants and newborns, interpretation of results should be based   on gestational age,  weight and in agreement with clinical   observations.    Premature Infant recommended reference ranges:   0-24 hours:  <8.0 mg/dL   24-48 hours: <12.0 mg/dL   3-5 days:    <15.0 mg/dL   6-29 days:   <15.0 mg/dL       BUN 18       Calcium 8.5       Chloride 107       CO2 23       Creatinine 1.0       CRP 0.90  Comment: CRP-Normal Application expected values:          <1.0        mg/dL   Normal Range          1.0 - 5.0  mg/dL   Indicates mild inflammation          5.0 - 10.0 mg/dL   Indicates severe inflammation        >10.0        mg/dL   Represents serious processes and frequently                                 indicates the presence of a bacterial infection.        eGFR 53       Glucose 129       Hematocrit 37.2       Hemoglobin 11.8       Magnesium  2.0       MCH 30.7       MCHC 31.7       MCV 97       MPV 12.1       Phosphorus Level 4.0       Platelet Count 139       Potassium 4.3       PROTEIN TOTAL 6.3       RBC 3.84       RDW 13.0       Sodium 138       WBC 3.56               Significant Imaging: I have reviewed all pertinent imaging results/findings within the past 24 hours.

## 2025-04-24 NOTE — ASSESSMENT & PLAN NOTE
-urine culture pending  -IV rocephin daily     Called pt at 813a to see if pt wanted a same day at 1p and pt was pulling into urgent care at time of call due to ear pain

## 2025-04-24 NOTE — CARE UPDATE
04/23/25 1915   Patient Assessment/Suction   Level of Consciousness (AVPU) alert   Respiratory Effort Normal;Unlabored   Expansion/Accessory Muscles/Retractions no use of accessory muscles   All Lung Fields Breath Sounds clear;diminished   Rhythm/Pattern, Respiratory pattern regular;no shortness of breath reported   Cough Frequency no cough   PRE-TX-O2   Device (Oxygen Therapy) room air   SpO2 95 %   Pulse Oximetry Type Continuous   $ Pulse Oximetry - Multiple Charge Pulse Oximetry - Multiple   Resp 20   Positioning   Head of Bed (HOB) Positioning HOB elevated   Positioning/Transfer Devices pillows;in use   Aerosol Therapy   $ Aerosol Therapy Charges PRN treatment not required   Education   $ Education Other (see comment);15 min

## 2025-04-24 NOTE — PLAN OF CARE
ABHAY spoke to Bee with [facility] who stated they accept patient and start of care 05/28/2025.       04/24/25 9101   Post-Acute Status   Post-Acute Authorization Home [facility]   Home Health Status Set-up Complete/Auth obtained

## 2025-04-24 NOTE — PLAN OF CARE
Problem: Adult Inpatient Plan of Care  Goal: Plan of Care Review  4/24/2025 1550 by Ally Castellanos RN  Outcome: Met  4/24/2025 1435 by Ally Castellanos RN  Outcome: Progressing  Goal: Patient-Specific Goal (Individualized)  4/24/2025 1550 by Ally Castellanos RN  Outcome: Met  4/24/2025 1435 by Ally Castellanos RN  Outcome: Progressing  Goal: Absence of Hospital-Acquired Illness or Injury  4/24/2025 1550 by Ally Castellanos RN  Outcome: Met  4/24/2025 1435 by Ally Castellanos RN  Outcome: Progressing  Goal: Optimal Comfort and Wellbeing  4/24/2025 1550 by Ally Castellanos RN  Outcome: Met  4/24/2025 1435 by Ally Castellanos RN  Outcome: Progressing  Goal: Readiness for Transition of Care  4/24/2025 1550 by Ally Castellanos RN  Outcome: Met  4/24/2025 1435 by Ally Castellanos RN  Outcome: Progressing

## 2025-04-24 NOTE — PLAN OF CARE
TN met with patient/family to review discharge recommendation of home health and is agreeable to plan    Patient/family provided list of facilities in-network with patient's payor plan. Providers that are owned, operated, or affiliated with [facility] are included on the list.     Notified that referral sent to below listed facilities from in-network list based on proximity to home/family support:   [facility]  2.  3.  4.  5. (can send more than 5)    Patient/family instructed to identify preference.    Preferred Facility: (if more than 1, listed in order of descending preference)  [facility]    Patient has declined to select a preferred provider and elects placement with the first accepting in network provider that is available to provide services as ordered by the physician       If an additional preferred facility not listed above is identified, additional referral to be sent. If above facilities unable to accept, will send additional referrals to in-network providers.   SW sent patient information to home health via [employer] for resumption of home health.       04/24/25 1142   Post-Acute Status   Post-Acute Authorization Home Health   Home Health Status Referrals Sent   Patient choice form signed by patient/caregiver List from System Post-Acute Care

## 2025-04-24 NOTE — PROGRESS NOTES
Pharmacist Renal Dose Adjustment Note    Soni Harris is a 89 y.o. female being treated with the medication enoxaparin    Patient Data:    Vital Signs (Most Recent):  Temp: 97.8 °F (36.6 °C) (04/24/25 1101)  Pulse: (!) 52 (04/24/25 1200)  Resp: 20 (04/24/25 1200)  BP: (!) 116/53 (04/24/25 1200)  SpO2: 96 % (04/24/25 1200) Vital Signs (72h Range):  Temp:  [96.5 °F (35.8 °C)-98.1 °F (36.7 °C)]   Pulse:  []   Resp:  [10-48]   BP: ()/(51-78)   SpO2:  [73 %-100 %]      Recent Labs   Lab 04/22/25  1256 04/23/25  0641 04/24/25  0401   CREATININE 1.2 1.0 1.0     Serum creatinine: 1 mg/dL 04/24/25 0401  Estimated creatinine clearance: 32.4 mL/min    Medication:enoxaparin dose: 30 mg frequency q24h will be changed to medication:enoxaparin  dose:40 mg frequency:q24h  CrCl > 30 mL/min for 2 days     Pharmacist's Name: Ruiz Dowling  Pharmacist's Extension: [phone]

## 2025-04-24 NOTE — NURSING
IV dc'd. Tolerated well. DC instructions, follow up apps, and meds reviewed with pt. Verbalized understanding. Pt brought down via wheelchair and face mask with all belongings to family transport. Safety maintained.

## 2025-04-25 LAB
OHS QRS DURATION: 82 MS
OHS QTC CALCULATION: 472 MS

## 2025-04-25 NOTE — ASSESSMENT & PLAN NOTE
Patient's blood pressure range in the last 24 hours was: No data recorded.The patient's inpatient anti-hypertensive regimen is listed below:  Current Antihypertensives       Plan  - BP is controlled, no changes needed to their regimen

## 2025-04-25 NOTE — DISCHARGE SUMMARY
[facility]  Discharge Summary      Patient Name: Soni Harris  MRN: [id number]  [facility]: [id number]  Patient Class: IP- Inpatient  Admission Date: 4/22/2025  Hospital Length of Stay: 2 days  Discharge Date and Time: 4/24/2025  Attending Physician: No att. providers found   Discharging Provider: Lulu Davis MD  Primary Care Provider: Fran Gilbert MD    Primary Care Team: Networked reference to record PCT     HPI:   Soni Harris is a 89 year old female with a previous medical history of arthritis, Glaucoma, Hypertension, CVA, and alzheimer's dementia who was brought in by family for cough, decreased activity and fever since 4/19/25. Patient is at baseline mental status of being oriented only to name and follows simple commands however they noted the patient becoming more lethargic since 4/19/25. Family member a bedside reports that daughter of patient was ill with an URI last week. Initial ED evaluation showed patient to be positive for covid but does not currently appear to be in any respiratory distress and was able to maintain and oxygen saturation 100% while laying in bed when oxygen via nasal cannula turned off. Urine studies showed >100WBC and >RBC with clumps and moderate bacteria. IV rocephin initiated in ED and continued upon admit. Hematoma noted to left side forehead but origin of this is unclear. Most recent fall was one month ago out of bed but patient not brought in for evaluation at that time. Possibly patient hit head on side rail. CT of head/c-spine ordered and pending. CBC showed no leukocytosis, CMP showed creatinine of 1.2 but most recent baseline was 0.7 from 2 years prior. D-dimer elevated and tachycardic so CTA of chest pending along with bilateral venous US of lower extremities. proBNP elevated but no peripheral signs of volume overload noted. Patient admitted by hospital medicine for further evaluation and management.    * No surgery found *       Hospital Course:   PAtient transferred from [facility] with UTI and Covid +. On covid protocol and IV rocephin. Cultures pending. Transfer out of stepdown.     URine culture multiorganism.  Clinically better  Post discharge, lab called with one bottle positive Staph species  Patient discharged on Keflex.  Will monitor repeat culture days and if something else results will notify patient.     ME home with  services      Goals of Care Treatment Preferences:  Code Status: Full Code      SDOH Screening:  The patient was screened for utility difficulties, food insecurity, transport difficulties, housing insecurity, and interpersonal safety and there were no concerns identified this admission.     Consults:   Consults (From admission, onward)          Status Ordering Provider     Inpatient consult to   Once        Provider:  (Not yet assigned)    Completed HETAL LINDA            Assessment & Plan  UTI (urinary tract infection)  -urine culture pending  -IV rocephin daily    Hypertension  Patient's blood pressure range in the last 24 hours was: No data recorded.The patient's inpatient anti-hypertensive regimen is listed below:  Current Antihypertensives       Plan  - BP is controlled, no changes needed to their regimen    Late onset Alzheimer's dementia with behavioral disturbance  Patient with dementia with likely etiology of alzheimer's dementia. Dementia is severe. The patient does not have signs of behavioral disturbance. Home dementia medications are Held or Continued: continued.. Continue non-pharmacologic interventions to prevent delirium (No VS between 11PM-5AM, activity during day, opening blinds, providing glasses/hearing aids, and up in chair during daytime). Will avoid narcotics and benzos unless absolutely necessary. PRN anti-psychotics are not prescribed to avoid self harm behaviors.  COVID-19  Patient is identified as High risk for severe complications of COVID 19 based on COVID risk  score of 4   Initiate standard COVID protocols; COVID-19 testing ,Infection Control notification  and isolation- respiratory, contact and droplet per protocol    Diagnostics: CBC, CMP, Procalcitonin, Ferritin, CRP, BNP, Troponin, Rapid Flu, Blood Culture x2, Portable CXR, and UA and culture    Management: Initiate targeted therapy with Remdesivir, 200mg IV x1, followed by 100mg IV daily x5 days total and Dexamethasone PO/IV 6mg daily x10 days, Maintain oxygen saturations 92-96% via Nasal Cannula 2 LPM and monitor with continuous/intermittent pulse oximetry. , Inhaled bronchodilators as needed for shortness of breath., and Continuous cardiac monitoring.    Advance Care Planning  Current advance care plan has not been discussed with patient/family/POA and patient currently wishes Full Code.   Hematoma of face  Hematoma noted to left side forehead of unknown origin. Family member at bedside reports last fall was from bed one month ago and patient was no evaluated in an emergency room    -CT of head and neck ordered and pending  Elevated d-dimer  -CTA of chest PE study ordered and pending  -Venous US of bilateral lower extremities ordered and pending    Final Active Diagnoses:    Diagnosis Date Noted POA    PRINCIPAL PROBLEM:  UTI (urinary tract infection) [N39.0] 04/22/2025 Yes    COVID-19 [U07.1] 04/22/2025 Yes    Hematoma of face [S00.83XA] 04/22/2025 Yes    Elevated d-dimer [R79.89] 04/22/2025 Yes    Late onset Alzheimer's dementia with behavioral disturbance [G30.1, F02.818] 01/18/2022 Yes    Hypertension [I10]  Yes      Problems Resolved During this Admission:    Diagnosis Date Noted Date Resolved POA    Traumatic hematoma of head [S00.93XA] 04/22/2025 04/22/2025 Yes    JILLIAN (acute kidney injury) [N17.9] 04/22/2025 04/24/2025 Yes       Discharged Condition: good    Disposition: Home-Health Care [facility]    Follow Up:   Follow-up Information       Frna Gilbert MD. Go on 4/29/2025.    Specialty: Family  Medicine  Why: Appointment scheduled for 11:00 AM  Contact information:  [address] [place] [address]  [phone]               [facility] Follow up.    Specialty: Home Health Services  Why: Home Health  Contact information:  [place] [phone]  [phone]                           Patient Instructions:   No discharge procedures on file.    Significant Diagnostic Studies: N/A    Pending Diagnostic Studies:       None           Medications:  Reconciled Home Medications:      Medication List        START taking these medications      cephALEXin 500 MG capsule  Commonly known as: KEFLEX  Take 1 capsule (500 mg total) by mouth every 12 (twelve) hours. for 5 days     lactobacillus acidophilus & bulgar 100 million cell packet  Commonly known as: LACTINEX  Take 1 packet (1 each total) by mouth 2 (two) times daily. for 7 days     multivitamin Tab  Take 1 tablet by mouth once daily.     VITAMIN C 500 MG tablet  Generic drug: ascorbic acid (vitamin C)  Take 1 tablet (500 mg total) by mouth 2 (two) times daily.            CONTINUE taking these medications      amLODIPine 5 MG tablet  Commonly known as: NORVASC  Take 5 mg by mouth once daily.     aspirin 325 MG tablet  Take 1 tablet (325 mg total) by mouth once daily.     atorvastatin 20 MG tablet  Commonly known as: LIPITOR  Take 20 mg by mouth once daily.     DEEP BLUE RELIEF Gel  Generic drug: msm-aloe vera-herbal66-emu oil  Apply 1 application  topically as needed.     diclofenac sodium 1 % Gel  Commonly known as: VOLTAREN  Apply 2 g topically 4 (four) times daily.     donepeziL 10 MG tablet  Commonly known as: ARICEPT  Take 10 mg by mouth once daily.     losartan 50 MG tablet  Commonly known as: COZAAR  Take 50 mg by mouth once daily.     memantine 10 MG Tab  Commonly known as: NAMENDA  Take 10 mg by mouth once daily.     multivit-min-FA-lycopen-lutein 0.4 mg-300 mcg- 250 mcg Tab  Take 1 tablet by mouth once daily.     omega 3-dha-epa-fish  oil 1,000 (120-180) mg Cap  Take 1 capsule by mouth once daily.     omeprazole 20 MG capsule  Commonly known as: PRILOSEC  Take 20 mg by mouth once daily.     risedronate 5 MG tablet  Commonly known as: ACTONEL  Take 5 mg by mouth once a week.     vitamin D 1000 units Tab  Commonly known as: VITAMIN D3  Take 5,000 Units by mouth once daily.            STOP taking these medications      metoprolol succinate 50 MG 24 hr tablet  Commonly known as: TOPROL-XL              Indwelling Lines/Drains at time of discharge:   Lines/Drains/Airways       None                   Time spent on the discharge of patient: 40 minutes         Lulu Davis MD  Department of Hospital Medicine  [facility]

## 2025-04-26 LAB
BACTERIA BLD CULT: ABNORMAL
GRAM STN SPEC: ABNORMAL
GRAM STN SPEC: ABNORMAL

## 2025-04-27 LAB — BACTERIA BLD CULT: NORMAL

## 2025-08-27 ENCOUNTER — DOCUMENT SCAN (OUTPATIENT)
Dept: HOME HEALTH SERVICES | Facility: HOSPITAL | Age: OVER 89
End: 2025-08-27
Payer: MEDICARE

## (undated) DEVICE — GOWN POLY REINF X-LONG XL

## (undated) DEVICE — SLEEVE SCD EXPRESS KNEE MEDIUM

## (undated) DEVICE — DRAPE THREE-QTR REINF 53X77IN

## (undated) DEVICE — WIRE DRILL TIP
Type: IMPLANTABLE DEVICE | Site: HIP | Status: NON-FUNCTIONAL
Removed: 2022-10-05

## (undated) DEVICE — DRAPE C ARM 42 X 120 10/BX

## (undated) DEVICE — SUT 0 VICRYL / CT-1

## (undated) DEVICE — ELECTRODE REM PLYHSV RETURN 9

## (undated) DEVICE — PAD PERI POST REPLACEMNT

## (undated) DEVICE — SUT CTD VICRYL 2.0

## (undated) DEVICE — LINER SUCTION 3000CC

## (undated) DEVICE — PACK BASIC

## (undated) DEVICE — BIT DRILL CANNL SS 5.0MM 300/2

## (undated) DEVICE — BANDAGE MATRIX HK LOOP 6IN 5YD

## (undated) DEVICE — UNDERGLOVES BIOGEL PI SIZE 8

## (undated) DEVICE — APPLICATOR CHLORAPREP ORN 26ML

## (undated) DEVICE — BNDG COFLEX FOAM LF2 ST 6X5YD

## (undated) DEVICE — GLOVE SURG ULTRA TOUCH 8

## (undated) DEVICE — STRAP OR TABLE 5IN X 72IN

## (undated) DEVICE — GOWN POLY REINF BRTH SLV XL

## (undated) DEVICE — SPONGE BULKEE II ABSRB 6X6.75

## (undated) DEVICE — GLOVE SURG ULTRA TOUCH 7.5

## (undated) DEVICE — DRAPE C-ARMOR EQUIPMENT COVER

## (undated) DEVICE — DRESSING TRANS 4X4 TEGADERM

## (undated) DEVICE — DRAPE IOBAN 2 STERI

## (undated) DEVICE — PADDING WYTEX UNDRCST 6INX4YD

## (undated) DEVICE — STAPLER SKIN ROTATING HEAD

## (undated) DEVICE — UNDERGLOVES BIOGEL PI SIZE 7.5

## (undated) DEVICE — SOL 9P NACL IRR PIC IL

## (undated) DEVICE — PACK CUSTOM UNIV BASIN SLI